# Patient Record
Sex: MALE | Race: WHITE | Employment: UNEMPLOYED | URBAN - METROPOLITAN AREA
[De-identification: names, ages, dates, MRNs, and addresses within clinical notes are randomized per-mention and may not be internally consistent; named-entity substitution may affect disease eponyms.]

---

## 2017-03-08 ENCOUNTER — OFFICE VISIT (OUTPATIENT)
Dept: PRIMARY CARE CLINIC | Age: 57
End: 2017-03-08

## 2017-03-08 VITALS — DIASTOLIC BLOOD PRESSURE: 80 MMHG | SYSTOLIC BLOOD PRESSURE: 140 MMHG | OXYGEN SATURATION: 100 % | HEART RATE: 82 BPM

## 2017-03-08 DIAGNOSIS — F17.200 SMOKER: ICD-10-CM

## 2017-03-08 DIAGNOSIS — G54.6 PHANTOM LIMB PAIN (HCC): ICD-10-CM

## 2017-03-08 DIAGNOSIS — E11.59 TYPE 2 DIABETES MELLITUS WITH OTHER CIRCULATORY COMPLICATION, UNSPECIFIED LONG TERM INSULIN USE STATUS: Primary | ICD-10-CM

## 2017-03-08 DIAGNOSIS — K21.9 GASTROESOPHAGEAL REFLUX DISEASE WITHOUT ESOPHAGITIS: ICD-10-CM

## 2017-03-08 DIAGNOSIS — I10 ESSENTIAL HYPERTENSION: ICD-10-CM

## 2017-03-08 LAB — HBA1C MFR BLD: 5.9 %

## 2017-03-08 PROCEDURE — 83036 HEMOGLOBIN GLYCOSYLATED A1C: CPT | Performed by: INTERNAL MEDICINE

## 2017-03-08 PROCEDURE — 99214 OFFICE O/P EST MOD 30 MIN: CPT | Performed by: INTERNAL MEDICINE

## 2017-03-08 RX ORDER — ATORVASTATIN CALCIUM 40 MG/1
40 TABLET, FILM COATED ORAL DAILY
Qty: 30 TABLET | Refills: 5 | Status: SHIPPED | OUTPATIENT
Start: 2017-03-08 | End: 2017-08-02 | Stop reason: SDUPTHER

## 2017-03-08 RX ORDER — LOSARTAN POTASSIUM 100 MG/1
100 TABLET ORAL DAILY
Qty: 30 TABLET | Refills: 6 | Status: SHIPPED | OUTPATIENT
Start: 2017-03-08 | End: 2017-08-02 | Stop reason: SDUPTHER

## 2017-03-08 RX ORDER — ASPIRIN 325 MG
325 TABLET ORAL DAILY
Qty: 30 TABLET | Refills: 5 | Status: SHIPPED | OUTPATIENT
Start: 2017-03-08 | End: 2017-05-24 | Stop reason: SDUPTHER

## 2017-03-08 RX ORDER — CLONIDINE HYDROCHLORIDE 0.3 MG/1
0.3 TABLET ORAL 3 TIMES DAILY
Qty: 30 TABLET | Refills: 6 | Status: SHIPPED | OUTPATIENT
Start: 2017-03-08 | End: 2017-03-08 | Stop reason: SDUPTHER

## 2017-03-08 RX ORDER — CLONIDINE HYDROCHLORIDE 0.3 MG/1
0.3 TABLET ORAL 3 TIMES DAILY
Qty: 90 TABLET | Refills: 6 | Status: SHIPPED | OUTPATIENT
Start: 2017-03-08 | End: 2017-08-02 | Stop reason: SDUPTHER

## 2017-03-08 RX ORDER — OMEPRAZOLE 20 MG/1
20 CAPSULE, DELAYED RELEASE ORAL DAILY
Qty: 30 CAPSULE | Refills: 5 | Status: SHIPPED | OUTPATIENT
Start: 2017-03-08 | End: 2017-08-02 | Stop reason: SDUPTHER

## 2017-03-08 RX ORDER — HYDROCHLOROTHIAZIDE 12.5 MG/1
12.5 CAPSULE, GELATIN COATED ORAL DAILY
Qty: 30 CAPSULE | Refills: 6 | Status: SHIPPED | OUTPATIENT
Start: 2017-03-08 | End: 2017-08-02

## 2017-03-08 RX ORDER — METOPROLOL TARTRATE 100 MG/1
TABLET ORAL
Qty: 60 TABLET | Refills: 6 | Status: SHIPPED | OUTPATIENT
Start: 2017-03-08 | End: 2017-08-02 | Stop reason: SDUPTHER

## 2017-03-08 RX ORDER — GABAPENTIN 800 MG/1
800 TABLET ORAL 3 TIMES DAILY
Qty: 90 TABLET | Refills: 5 | Status: SHIPPED | OUTPATIENT
Start: 2017-03-08 | End: 2017-08-02 | Stop reason: SDUPTHER

## 2017-03-08 ASSESSMENT — PATIENT HEALTH QUESTIONNAIRE - PHQ9
2. FEELING DOWN, DEPRESSED OR HOPELESS: 0
1. LITTLE INTEREST OR PLEASURE IN DOING THINGS: 0
SUM OF ALL RESPONSES TO PHQ9 QUESTIONS 1 & 2: 0
SUM OF ALL RESPONSES TO PHQ QUESTIONS 1-9: 0

## 2017-03-08 ASSESSMENT — ENCOUNTER SYMPTOMS
COUGH: 0
EYES NEGATIVE: 1
CHEST TIGHTNESS: 0
WHEEZING: 0
SHORTNESS OF BREATH: 0
GASTROINTESTINAL NEGATIVE: 1

## 2017-05-24 DIAGNOSIS — E11.59 TYPE 2 DIABETES MELLITUS WITH OTHER CIRCULATORY COMPLICATION, UNSPECIFIED LONG TERM INSULIN USE STATUS: ICD-10-CM

## 2017-05-24 RX ORDER — ASPIRIN 325 MG
325 TABLET ORAL DAILY
Qty: 30 TABLET | Refills: 5 | Status: SHIPPED | OUTPATIENT
Start: 2017-05-24 | End: 2017-08-02 | Stop reason: SDUPTHER

## 2017-06-21 ENCOUNTER — TELEPHONE (OUTPATIENT)
Dept: PRIMARY CARE CLINIC | Age: 57
End: 2017-06-21

## 2017-06-27 RX ORDER — ESCITALOPRAM OXALATE 10 MG/1
10 TABLET ORAL DAILY
Qty: 30 TABLET | Refills: 3 | Status: SHIPPED | OUTPATIENT
Start: 2017-06-27 | End: 2017-10-24 | Stop reason: SDUPTHER

## 2017-08-02 ENCOUNTER — OFFICE VISIT (OUTPATIENT)
Dept: PRIMARY CARE CLINIC | Age: 57
End: 2017-08-02

## 2017-08-02 VITALS
TEMPERATURE: 98.2 F | OXYGEN SATURATION: 97 % | RESPIRATION RATE: 16 BRPM | SYSTOLIC BLOOD PRESSURE: 150 MMHG | HEART RATE: 83 BPM | DIASTOLIC BLOOD PRESSURE: 80 MMHG

## 2017-08-02 DIAGNOSIS — Z12.11 SCREEN FOR COLON CANCER: ICD-10-CM

## 2017-08-02 DIAGNOSIS — E11.9 DIET-CONTROLLED DIABETES MELLITUS (HCC): ICD-10-CM

## 2017-08-02 DIAGNOSIS — G54.6 PHANTOM LIMB PAIN (HCC): ICD-10-CM

## 2017-08-02 DIAGNOSIS — Z11.4 ENCOUNTER FOR SCREENING FOR HIV: ICD-10-CM

## 2017-08-02 DIAGNOSIS — Z23 NEED FOR TDAP VACCINATION: ICD-10-CM

## 2017-08-02 DIAGNOSIS — K21.9 GASTROESOPHAGEAL REFLUX DISEASE WITHOUT ESOPHAGITIS: ICD-10-CM

## 2017-08-02 DIAGNOSIS — I10 ESSENTIAL HYPERTENSION: Primary | ICD-10-CM

## 2017-08-02 DIAGNOSIS — F17.200 SMOKER: ICD-10-CM

## 2017-08-02 LAB
A/G RATIO: 1.8 (ref 1.1–2.2)
ALBUMIN SERPL-MCNC: 4.5 G/DL (ref 3.4–5)
ALP BLD-CCNC: 87 U/L (ref 40–129)
ALT SERPL-CCNC: 23 U/L (ref 10–40)
ANION GAP SERPL CALCULATED.3IONS-SCNC: 17 MMOL/L (ref 3–16)
AST SERPL-CCNC: 21 U/L (ref 15–37)
BASOPHILS ABSOLUTE: 0 K/UL (ref 0–0.2)
BASOPHILS RELATIVE PERCENT: 0.6 %
BILIRUB SERPL-MCNC: 0.3 MG/DL (ref 0–1)
BILIRUBIN URINE: ABNORMAL
BLOOD, URINE: NEGATIVE
BUN BLDV-MCNC: 9 MG/DL (ref 7–20)
CALCIUM SERPL-MCNC: 9.1 MG/DL (ref 8.3–10.6)
CHLORIDE BLD-SCNC: 102 MMOL/L (ref 99–110)
CHOLESTEROL, TOTAL: 274 MG/DL (ref 0–199)
CLARITY: CLEAR
CO2: 22 MMOL/L (ref 21–32)
COLOR: ABNORMAL
CREAT SERPL-MCNC: 0.7 MG/DL (ref 0.9–1.3)
CREATININE URINE: 219 MG/DL (ref 39–259)
EOSINOPHILS ABSOLUTE: 0.1 K/UL (ref 0–0.6)
EOSINOPHILS RELATIVE PERCENT: 1.3 %
EPITHELIAL CELLS, UA: 3 /HPF (ref 0–5)
GFR AFRICAN AMERICAN: >60
GFR NON-AFRICAN AMERICAN: >60
GLOBULIN: 2.5 G/DL
GLUCOSE BLD-MCNC: 75 MG/DL (ref 70–99)
GLUCOSE URINE: NEGATIVE MG/DL
HBA1C MFR BLD: 6 %
HCT VFR BLD CALC: 49.5 % (ref 40.5–52.5)
HDLC SERPL-MCNC: 28 MG/DL (ref 40–60)
HEMOGLOBIN: 16.6 G/DL (ref 13.5–17.5)
HEPATITIS C ANTIBODY INTERPRETATION: NORMAL
HYALINE CASTS: 2 /LPF (ref 0–8)
KETONES, URINE: ABNORMAL MG/DL
LDL CHOLESTEROL CALCULATED: ABNORMAL MG/DL
LDL CHOLESTEROL DIRECT: 150 MG/DL
LEUKOCYTE ESTERASE, URINE: NEGATIVE
LYMPHOCYTES ABSOLUTE: 1.6 K/UL (ref 1–5.1)
LYMPHOCYTES RELATIVE PERCENT: 23.1 %
MCH RBC QN AUTO: 30.8 PG (ref 26–34)
MCHC RBC AUTO-ENTMCNC: 33.5 G/DL (ref 31–36)
MCV RBC AUTO: 92 FL (ref 80–100)
MICROALBUMIN UR-MCNC: 3.8 MG/DL
MICROALBUMIN/CREAT UR-RTO: 17.4 MG/G (ref 0–30)
MICROSCOPIC EXAMINATION: YES
MONOCYTES ABSOLUTE: 0.5 K/UL (ref 0–1.3)
MONOCYTES RELATIVE PERCENT: 6.9 %
NEUTROPHILS ABSOLUTE: 4.7 K/UL (ref 1.7–7.7)
NEUTROPHILS RELATIVE PERCENT: 68.1 %
NITRITE, URINE: NEGATIVE
PDW BLD-RTO: 14.1 % (ref 12.4–15.4)
PH UA: 6.5
PLATELET # BLD: 297 K/UL (ref 135–450)
PMV BLD AUTO: 8.8 FL (ref 5–10.5)
POTASSIUM SERPL-SCNC: 4.6 MMOL/L (ref 3.5–5.1)
PROTEIN UA: 30 MG/DL
RBC # BLD: 5.38 M/UL (ref 4.2–5.9)
RBC UA: 7 /HPF (ref 0–4)
SODIUM BLD-SCNC: 141 MMOL/L (ref 136–145)
SPECIFIC GRAVITY UA: 1.03
TOTAL PROTEIN: 7 G/DL (ref 6.4–8.2)
TRIGL SERPL-MCNC: 645 MG/DL (ref 0–150)
TSH SERPL DL<=0.05 MIU/L-ACNC: 2.43 UIU/ML (ref 0.27–4.2)
URINE TYPE: ABNORMAL
UROBILINOGEN, URINE: 1 E.U./DL
VLDLC SERPL CALC-MCNC: ABNORMAL MG/DL
WBC # BLD: 6.9 K/UL (ref 4–11)
WBC UA: 3 /HPF (ref 0–5)

## 2017-08-02 PROCEDURE — 90715 TDAP VACCINE 7 YRS/> IM: CPT | Performed by: INTERNAL MEDICINE

## 2017-08-02 PROCEDURE — 90471 IMMUNIZATION ADMIN: CPT | Performed by: INTERNAL MEDICINE

## 2017-08-02 PROCEDURE — 83036 HEMOGLOBIN GLYCOSYLATED A1C: CPT | Performed by: INTERNAL MEDICINE

## 2017-08-02 PROCEDURE — 99214 OFFICE O/P EST MOD 30 MIN: CPT | Performed by: INTERNAL MEDICINE

## 2017-08-02 RX ORDER — ATORVASTATIN CALCIUM 40 MG/1
40 TABLET, FILM COATED ORAL DAILY
Qty: 30 TABLET | Refills: 6 | Status: SHIPPED | OUTPATIENT
Start: 2017-08-02 | End: 2018-03-01 | Stop reason: SDUPTHER

## 2017-08-02 RX ORDER — METOPROLOL TARTRATE 100 MG/1
TABLET ORAL
Qty: 60 TABLET | Refills: 6 | Status: SHIPPED | OUTPATIENT
Start: 2017-08-02 | End: 2018-03-28

## 2017-08-02 RX ORDER — HYDROCHLOROTHIAZIDE 25 MG/1
25 TABLET ORAL DAILY
Qty: 30 TABLET | Refills: 3 | Status: SHIPPED | OUTPATIENT
Start: 2017-08-02 | End: 2017-11-28 | Stop reason: SDUPTHER

## 2017-08-02 RX ORDER — CLONIDINE HYDROCHLORIDE 0.3 MG/1
0.3 TABLET ORAL 3 TIMES DAILY
Qty: 90 TABLET | Refills: 6 | Status: SHIPPED | OUTPATIENT
Start: 2017-08-02 | End: 2017-12-21 | Stop reason: SDUPTHER

## 2017-08-02 RX ORDER — LOSARTAN POTASSIUM 100 MG/1
100 TABLET ORAL DAILY
Qty: 30 TABLET | Refills: 6 | Status: SHIPPED | OUTPATIENT
Start: 2017-08-02 | End: 2018-03-28 | Stop reason: SDUPTHER

## 2017-08-02 RX ORDER — OMEPRAZOLE 20 MG/1
20 CAPSULE, DELAYED RELEASE ORAL DAILY
Qty: 30 CAPSULE | Refills: 5 | Status: SHIPPED | OUTPATIENT
Start: 2017-08-02 | End: 2018-03-28

## 2017-08-02 RX ORDER — GABAPENTIN 800 MG/1
800 TABLET ORAL 3 TIMES DAILY
Qty: 90 TABLET | Refills: 6 | Status: SHIPPED | OUTPATIENT
Start: 2017-08-02 | End: 2018-03-28 | Stop reason: SDUPTHER

## 2017-08-02 RX ORDER — ASPIRIN 325 MG
325 TABLET ORAL DAILY
Qty: 90 TABLET | Refills: 5 | Status: SHIPPED | OUTPATIENT
Start: 2017-08-02 | End: 2018-08-25 | Stop reason: SDUPTHER

## 2017-08-02 ASSESSMENT — ENCOUNTER SYMPTOMS
EYES NEGATIVE: 1
COUGH: 0
BLOOD IN STOOL: 0
CHEST TIGHTNESS: 0
CONSTIPATION: 0
DIARRHEA: 0
GASTROINTESTINAL NEGATIVE: 1
WHEEZING: 0
SHORTNESS OF BREATH: 0

## 2017-08-03 LAB — HIV-1 AND HIV-2 ANTIBODIES: NORMAL

## 2017-09-14 ENCOUNTER — TELEPHONE (OUTPATIENT)
Dept: PRIMARY CARE CLINIC | Age: 57
End: 2017-09-14

## 2017-10-09 ENCOUNTER — TELEPHONE (OUTPATIENT)
Dept: OTHER | Facility: CLINIC | Age: 57
End: 2017-10-09

## 2017-10-24 RX ORDER — ESCITALOPRAM OXALATE 10 MG/1
TABLET ORAL
Qty: 30 TABLET | Refills: 2 | Status: SHIPPED | OUTPATIENT
Start: 2017-10-24 | End: 2018-01-18 | Stop reason: SDUPTHER

## 2017-11-28 DIAGNOSIS — I10 ESSENTIAL HYPERTENSION: ICD-10-CM

## 2017-12-06 RX ORDER — HYDROCHLOROTHIAZIDE 25 MG/1
TABLET ORAL
Qty: 30 TABLET | Refills: 2 | Status: SHIPPED | OUTPATIENT
Start: 2017-12-06 | End: 2018-03-28 | Stop reason: SDUPTHER

## 2017-12-21 DIAGNOSIS — I10 ESSENTIAL HYPERTENSION: ICD-10-CM

## 2017-12-21 RX ORDER — CLONIDINE HYDROCHLORIDE 0.3 MG/1
TABLET ORAL
Qty: 90 TABLET | Refills: 0 | Status: SHIPPED | OUTPATIENT
Start: 2017-12-21 | End: 2018-03-28

## 2018-01-18 RX ORDER — ESCITALOPRAM OXALATE 10 MG/1
TABLET ORAL
Qty: 30 TABLET | Refills: 1 | Status: SHIPPED | OUTPATIENT
Start: 2018-01-18 | End: 2018-03-17 | Stop reason: SDUPTHER

## 2018-03-01 DIAGNOSIS — K21.9 GASTROESOPHAGEAL REFLUX DISEASE WITHOUT ESOPHAGITIS: ICD-10-CM

## 2018-03-01 DIAGNOSIS — E11.9 DIET-CONTROLLED DIABETES MELLITUS (HCC): ICD-10-CM

## 2018-03-01 RX ORDER — OMEPRAZOLE 20 MG/1
CAPSULE, DELAYED RELEASE ORAL
Qty: 30 CAPSULE | Refills: 4 | Status: SHIPPED | OUTPATIENT
Start: 2018-03-01 | End: 2018-03-28 | Stop reason: SDUPTHER

## 2018-03-01 RX ORDER — ATORVASTATIN CALCIUM 40 MG/1
TABLET, FILM COATED ORAL
Qty: 30 TABLET | Refills: 5 | Status: SHIPPED | OUTPATIENT
Start: 2018-03-01 | End: 2018-03-28 | Stop reason: SDUPTHER

## 2018-03-14 ENCOUNTER — TELEPHONE (OUTPATIENT)
Dept: ADMINISTRATIVE | Age: 58
End: 2018-03-14

## 2018-03-20 RX ORDER — ESCITALOPRAM OXALATE 10 MG/1
TABLET ORAL
Qty: 30 TABLET | Refills: 0 | Status: SHIPPED | OUTPATIENT
Start: 2018-03-20 | End: 2018-03-28 | Stop reason: SDUPTHER

## 2018-03-28 ENCOUNTER — OFFICE VISIT (OUTPATIENT)
Dept: PRIMARY CARE CLINIC | Age: 58
End: 2018-03-28

## 2018-03-28 VITALS
SYSTOLIC BLOOD PRESSURE: 170 MMHG | DIASTOLIC BLOOD PRESSURE: 100 MMHG | HEART RATE: 82 BPM | TEMPERATURE: 97.5 F | OXYGEN SATURATION: 100 %

## 2018-03-28 DIAGNOSIS — K21.9 GASTROESOPHAGEAL REFLUX DISEASE WITHOUT ESOPHAGITIS: ICD-10-CM

## 2018-03-28 DIAGNOSIS — G54.6 PHANTOM LIMB PAIN (HCC): ICD-10-CM

## 2018-03-28 DIAGNOSIS — F32.A ANXIETY AND DEPRESSION: Primary | ICD-10-CM

## 2018-03-28 DIAGNOSIS — Z00.00 PHYSICAL EXAM: ICD-10-CM

## 2018-03-28 DIAGNOSIS — E11.9 DIET-CONTROLLED DIABETES MELLITUS (HCC): ICD-10-CM

## 2018-03-28 DIAGNOSIS — F41.9 ANXIETY AND DEPRESSION: Primary | ICD-10-CM

## 2018-03-28 DIAGNOSIS — I10 ESSENTIAL HYPERTENSION: ICD-10-CM

## 2018-03-28 LAB
A/G RATIO: 1.6 (ref 1.1–2.2)
ALBUMIN SERPL-MCNC: 4.1 G/DL (ref 3.4–5)
ALP BLD-CCNC: 95 U/L (ref 40–129)
ALT SERPL-CCNC: 13 U/L (ref 10–40)
ANION GAP SERPL CALCULATED.3IONS-SCNC: 15 MMOL/L (ref 3–16)
AST SERPL-CCNC: 15 U/L (ref 15–37)
BASOPHILS ABSOLUTE: 0.1 K/UL (ref 0–0.2)
BASOPHILS RELATIVE PERCENT: 0.7 %
BILIRUB SERPL-MCNC: 0.3 MG/DL (ref 0–1)
BUN BLDV-MCNC: 6 MG/DL (ref 7–20)
CALCIUM SERPL-MCNC: 9.1 MG/DL (ref 8.3–10.6)
CHLORIDE BLD-SCNC: 98 MMOL/L (ref 99–110)
CHOLESTEROL, TOTAL: 248 MG/DL (ref 0–199)
CO2: 26 MMOL/L (ref 21–32)
CREAT SERPL-MCNC: 0.6 MG/DL (ref 0.9–1.3)
EOSINOPHILS ABSOLUTE: 0.1 K/UL (ref 0–0.6)
EOSINOPHILS RELATIVE PERCENT: 1 %
GFR AFRICAN AMERICAN: >60
GFR NON-AFRICAN AMERICAN: >60
GLOBULIN: 2.6 G/DL
GLUCOSE BLD-MCNC: 86 MG/DL (ref 70–99)
HBA1C MFR BLD: 5.8 %
HCT VFR BLD CALC: 45.2 % (ref 40.5–52.5)
HDLC SERPL-MCNC: 25 MG/DL (ref 40–60)
HEMOGLOBIN: 15.7 G/DL (ref 13.5–17.5)
LDL CHOLESTEROL CALCULATED: ABNORMAL MG/DL
LDL CHOLESTEROL DIRECT: 170 MG/DL
LYMPHOCYTES ABSOLUTE: 1.8 K/UL (ref 1–5.1)
LYMPHOCYTES RELATIVE PERCENT: 19.5 %
MCH RBC QN AUTO: 30.9 PG (ref 26–34)
MCHC RBC AUTO-ENTMCNC: 34.8 G/DL (ref 31–36)
MCV RBC AUTO: 89 FL (ref 80–100)
MONOCYTES ABSOLUTE: 0.5 K/UL (ref 0–1.3)
MONOCYTES RELATIVE PERCENT: 5.7 %
NEUTROPHILS ABSOLUTE: 6.7 K/UL (ref 1.7–7.7)
NEUTROPHILS RELATIVE PERCENT: 73.1 %
PDW BLD-RTO: 13.7 % (ref 12.4–15.4)
PLATELET # BLD: 413 K/UL (ref 135–450)
PMV BLD AUTO: 7.5 FL (ref 5–10.5)
POTASSIUM SERPL-SCNC: 4.7 MMOL/L (ref 3.5–5.1)
RBC # BLD: 5.08 M/UL (ref 4.2–5.9)
SODIUM BLD-SCNC: 139 MMOL/L (ref 136–145)
TOTAL PROTEIN: 6.7 G/DL (ref 6.4–8.2)
TRIGL SERPL-MCNC: 334 MG/DL (ref 0–150)
TSH SERPL DL<=0.05 MIU/L-ACNC: 2.17 UIU/ML (ref 0.27–4.2)
VLDLC SERPL CALC-MCNC: ABNORMAL MG/DL
WBC # BLD: 9.1 K/UL (ref 4–11)

## 2018-03-28 PROCEDURE — 3044F HG A1C LEVEL LT 7.0%: CPT | Performed by: INTERNAL MEDICINE

## 2018-03-28 PROCEDURE — 99214 OFFICE O/P EST MOD 30 MIN: CPT | Performed by: INTERNAL MEDICINE

## 2018-03-28 PROCEDURE — G8421 BMI NOT CALCULATED: HCPCS | Performed by: INTERNAL MEDICINE

## 2018-03-28 PROCEDURE — 83036 HEMOGLOBIN GLYCOSYLATED A1C: CPT | Performed by: INTERNAL MEDICINE

## 2018-03-28 PROCEDURE — 3017F COLORECTAL CA SCREEN DOC REV: CPT | Performed by: INTERNAL MEDICINE

## 2018-03-28 PROCEDURE — G8484 FLU IMMUNIZE NO ADMIN: HCPCS | Performed by: INTERNAL MEDICINE

## 2018-03-28 PROCEDURE — G8427 DOCREV CUR MEDS BY ELIG CLIN: HCPCS | Performed by: INTERNAL MEDICINE

## 2018-03-28 PROCEDURE — 4004F PT TOBACCO SCREEN RCVD TLK: CPT | Performed by: INTERNAL MEDICINE

## 2018-03-28 RX ORDER — HYDROCHLOROTHIAZIDE 25 MG/1
25 TABLET ORAL DAILY
Qty: 30 TABLET | Refills: 5 | Status: SHIPPED | OUTPATIENT
Start: 2018-03-28 | End: 2018-04-24 | Stop reason: SDUPTHER

## 2018-03-28 RX ORDER — NEBIVOLOL 10 MG/1
10 TABLET ORAL DAILY
Qty: 30 TABLET | Refills: 5 | Status: SHIPPED | OUTPATIENT
Start: 2018-03-28 | End: 2018-04-24 | Stop reason: SDUPTHER

## 2018-03-28 RX ORDER — LOSARTAN POTASSIUM 100 MG/1
100 TABLET ORAL DAILY
Qty: 30 TABLET | Refills: 6 | Status: SHIPPED | OUTPATIENT
Start: 2018-03-28 | End: 2018-04-24 | Stop reason: SDUPTHER

## 2018-03-28 RX ORDER — HYDROCODONE BITARTRATE AND ACETAMINOPHEN 5; 325 MG/1; MG/1
1 TABLET ORAL EVERY 8 HOURS PRN
Qty: 30 TABLET | Refills: 0 | Status: SHIPPED | OUTPATIENT
Start: 2018-03-28 | End: 2018-04-24 | Stop reason: SDUPTHER

## 2018-03-28 RX ORDER — GABAPENTIN 800 MG/1
800 TABLET ORAL 3 TIMES DAILY
Qty: 90 TABLET | Refills: 6 | Status: SHIPPED | OUTPATIENT
Start: 2018-03-28 | End: 2019-02-25

## 2018-03-28 RX ORDER — ESCITALOPRAM OXALATE 10 MG/1
10 TABLET ORAL DAILY
Qty: 30 TABLET | Refills: 6 | Status: SHIPPED | OUTPATIENT
Start: 2018-03-28 | End: 2018-11-29 | Stop reason: SDUPTHER

## 2018-03-28 RX ORDER — OMEPRAZOLE 20 MG/1
20 CAPSULE, DELAYED RELEASE ORAL DAILY
Qty: 30 CAPSULE | Refills: 5 | Status: SHIPPED | OUTPATIENT
Start: 2018-03-28 | End: 2019-01-27 | Stop reason: SDUPTHER

## 2018-03-28 RX ORDER — ATORVASTATIN CALCIUM 40 MG/1
40 TABLET, FILM COATED ORAL DAILY
Qty: 30 TABLET | Refills: 6 | Status: SHIPPED | OUTPATIENT
Start: 2018-03-28 | End: 2018-03-29 | Stop reason: SDUPTHER

## 2018-03-28 RX ORDER — CLONIDINE HYDROCHLORIDE 0.3 MG/1
0.3 TABLET ORAL 3 TIMES DAILY PRN
Qty: 90 TABLET | Refills: 3 | Status: SHIPPED | OUTPATIENT
Start: 2018-03-28 | End: 2018-04-24 | Stop reason: SDUPTHER

## 2018-03-28 ASSESSMENT — ENCOUNTER SYMPTOMS
DIARRHEA: 0
CONSTIPATION: 0
WHEEZING: 0
GASTROINTESTINAL NEGATIVE: 1
BLOOD IN STOOL: 0
EYES NEGATIVE: 1
COUGH: 0
CHEST TIGHTNESS: 0
SHORTNESS OF BREATH: 0

## 2018-03-28 ASSESSMENT — PATIENT HEALTH QUESTIONNAIRE - PHQ9
1. LITTLE INTEREST OR PLEASURE IN DOING THINGS: 0
SUM OF ALL RESPONSES TO PHQ QUESTIONS 1-9: 0
2. FEELING DOWN, DEPRESSED OR HOPELESS: 0
SUM OF ALL RESPONSES TO PHQ9 QUESTIONS 1 & 2: 0

## 2018-03-29 DIAGNOSIS — E11.9 DIET-CONTROLLED DIABETES MELLITUS (HCC): ICD-10-CM

## 2018-03-29 LAB — VITAMIN D 25-HYDROXY: 10.7 NG/ML

## 2018-03-29 RX ORDER — ERGOCALCIFEROL (VITAMIN D2) 1250 MCG
50000 CAPSULE ORAL WEEKLY
Qty: 4 CAPSULE | Refills: 5 | Status: SHIPPED | OUTPATIENT
Start: 2018-03-29 | End: 2019-08-09

## 2018-03-29 RX ORDER — ATORVASTATIN CALCIUM 40 MG/1
40 TABLET, FILM COATED ORAL DAILY
Qty: 30 TABLET | Refills: 6 | Status: SHIPPED | OUTPATIENT
Start: 2018-03-29 | End: 2018-06-05

## 2018-04-20 ENCOUNTER — TELEPHONE (OUTPATIENT)
Dept: PRIMARY CARE CLINIC | Age: 58
End: 2018-04-20

## 2018-04-24 ENCOUNTER — OFFICE VISIT (OUTPATIENT)
Dept: PRIMARY CARE CLINIC | Age: 58
End: 2018-04-24

## 2018-04-24 VITALS — HEART RATE: 87 BPM | TEMPERATURE: 98.2 F | DIASTOLIC BLOOD PRESSURE: 90 MMHG | SYSTOLIC BLOOD PRESSURE: 160 MMHG

## 2018-04-24 DIAGNOSIS — E78.2 MIXED HYPERLIPIDEMIA: ICD-10-CM

## 2018-04-24 DIAGNOSIS — I10 ESSENTIAL HYPERTENSION: ICD-10-CM

## 2018-04-24 DIAGNOSIS — G54.6 PHANTOM LIMB PAIN (HCC): ICD-10-CM

## 2018-04-24 DIAGNOSIS — F17.200 SMOKER: ICD-10-CM

## 2018-04-24 DIAGNOSIS — E55.9 VITAMIN D DEFICIENCY: ICD-10-CM

## 2018-04-24 PROCEDURE — G8427 DOCREV CUR MEDS BY ELIG CLIN: HCPCS | Performed by: INTERNAL MEDICINE

## 2018-04-24 PROCEDURE — 99214 OFFICE O/P EST MOD 30 MIN: CPT | Performed by: INTERNAL MEDICINE

## 2018-04-24 PROCEDURE — 3017F COLORECTAL CA SCREEN DOC REV: CPT | Performed by: INTERNAL MEDICINE

## 2018-04-24 PROCEDURE — 4004F PT TOBACCO SCREEN RCVD TLK: CPT | Performed by: INTERNAL MEDICINE

## 2018-04-24 PROCEDURE — G8421 BMI NOT CALCULATED: HCPCS | Performed by: INTERNAL MEDICINE

## 2018-04-24 RX ORDER — LOSARTAN POTASSIUM 100 MG/1
100 TABLET ORAL DAILY
Qty: 30 TABLET | Refills: 6 | Status: SHIPPED | OUTPATIENT
Start: 2018-04-24 | End: 2018-06-05 | Stop reason: SDUPTHER

## 2018-04-24 RX ORDER — NEBIVOLOL 10 MG/1
10 TABLET ORAL DAILY
Qty: 30 TABLET | Refills: 5 | Status: SHIPPED | OUTPATIENT
Start: 2018-04-24 | End: 2018-06-05 | Stop reason: SDUPTHER

## 2018-04-24 RX ORDER — CLONIDINE HYDROCHLORIDE 0.3 MG/1
0.3 TABLET ORAL 3 TIMES DAILY PRN
Qty: 90 TABLET | Refills: 3 | Status: SHIPPED | OUTPATIENT
Start: 2018-04-24 | End: 2018-06-05 | Stop reason: SDUPTHER

## 2018-04-24 RX ORDER — HYDROCHLOROTHIAZIDE 25 MG/1
25 TABLET ORAL DAILY
Qty: 30 TABLET | Refills: 5 | Status: SHIPPED | OUTPATIENT
Start: 2018-04-24 | End: 2018-06-05 | Stop reason: SDUPTHER

## 2018-04-24 RX ORDER — ROSUVASTATIN CALCIUM 5 MG/1
5 TABLET, COATED ORAL NIGHTLY
Qty: 30 TABLET | Refills: 5 | Status: SHIPPED | OUTPATIENT
Start: 2018-04-24 | End: 2018-09-06 | Stop reason: SDUPTHER

## 2018-04-24 RX ORDER — HYDROCODONE BITARTRATE AND ACETAMINOPHEN 5; 325 MG/1; MG/1
1 TABLET ORAL EVERY 8 HOURS PRN
Qty: 30 TABLET | Refills: 0 | Status: SHIPPED | OUTPATIENT
Start: 2018-04-24 | End: 2018-05-08 | Stop reason: ALTCHOICE

## 2018-04-24 ASSESSMENT — ENCOUNTER SYMPTOMS
GASTROINTESTINAL NEGATIVE: 1
BLOOD IN STOOL: 0
EYES NEGATIVE: 1
SHORTNESS OF BREATH: 0
CONSTIPATION: 0
COUGH: 0
CHEST TIGHTNESS: 0
DIARRHEA: 0
WHEEZING: 0

## 2018-04-25 ENCOUNTER — TELEPHONE (OUTPATIENT)
Dept: PAIN MANAGEMENT | Age: 58
End: 2018-04-25

## 2018-05-08 ENCOUNTER — OFFICE VISIT (OUTPATIENT)
Dept: PAIN MANAGEMENT | Age: 58
End: 2018-05-08

## 2018-05-08 VITALS — SYSTOLIC BLOOD PRESSURE: 163 MMHG | DIASTOLIC BLOOD PRESSURE: 90 MMHG | HEART RATE: 89 BPM

## 2018-05-08 DIAGNOSIS — Z51.81 ENCOUNTER FOR MONITORING OPIOID MAINTENANCE THERAPY: ICD-10-CM

## 2018-05-08 DIAGNOSIS — Z79.891 ENCOUNTER FOR MONITORING OPIOID MAINTENANCE THERAPY: ICD-10-CM

## 2018-05-08 DIAGNOSIS — F19.11 HISTORY OF SUBSTANCE ABUSE (HCC): ICD-10-CM

## 2018-05-08 DIAGNOSIS — G54.6 PHANTOM LIMB SYNDROME WITH PAIN (HCC): Primary | ICD-10-CM

## 2018-05-08 DIAGNOSIS — G89.4 CHRONIC PAIN SYNDROME: ICD-10-CM

## 2018-05-08 PROCEDURE — 4004F PT TOBACCO SCREEN RCVD TLK: CPT | Performed by: ANESTHESIOLOGY

## 2018-05-08 PROCEDURE — 96160 PT-FOCUSED HLTH RISK ASSMT: CPT | Performed by: ANESTHESIOLOGY

## 2018-05-08 PROCEDURE — G8421 BMI NOT CALCULATED: HCPCS | Performed by: ANESTHESIOLOGY

## 2018-05-08 PROCEDURE — G8427 DOCREV CUR MEDS BY ELIG CLIN: HCPCS | Performed by: ANESTHESIOLOGY

## 2018-05-08 PROCEDURE — 99204 OFFICE O/P NEW MOD 45 MIN: CPT | Performed by: ANESTHESIOLOGY

## 2018-05-08 PROCEDURE — 3017F COLORECTAL CA SCREEN DOC REV: CPT | Performed by: ANESTHESIOLOGY

## 2018-05-08 RX ORDER — TRAZODONE HYDROCHLORIDE 50 MG/1
50 TABLET ORAL NIGHTLY
Qty: 60 TABLET | Refills: 0 | Status: SHIPPED | OUTPATIENT
Start: 2018-05-08 | End: 2019-02-25

## 2018-05-08 RX ORDER — BUPRENORPHINE 5 UG/H
1 PATCH TRANSDERMAL WEEKLY
Qty: 4 PATCH | Refills: 0 | Status: SHIPPED | OUTPATIENT
Start: 2018-05-08 | End: 2018-07-09 | Stop reason: SDUPTHER

## 2018-05-08 RX ORDER — BACLOFEN 10 MG/1
10 TABLET ORAL 2 TIMES DAILY
Qty: 60 TABLET | Refills: 0 | Status: SHIPPED | OUTPATIENT
Start: 2018-05-08 | End: 2018-07-09 | Stop reason: SDUPTHER

## 2018-05-08 ASSESSMENT — PATIENT HEALTH QUESTIONNAIRE - PHQ9
3. TROUBLE FALLING OR STAYING ASLEEP: 3
6. FEELING BAD ABOUT YOURSELF - OR THAT YOU ARE A FAILURE OR HAVE LET YOURSELF OR YOUR FAMILY DOWN: 3
SUM OF ALL RESPONSES TO PHQ QUESTIONS 1-9: 21
7. TROUBLE CONCENTRATING ON THINGS, SUCH AS READING THE NEWSPAPER OR WATCHING TELEVISION: 3
2. FEELING DOWN, DEPRESSED OR HOPELESS: 3
5. POOR APPETITE OR OVEREATING: 3
1. LITTLE INTEREST OR PLEASURE IN DOING THINGS: 3
8. MOVING OR SPEAKING SO SLOWLY THAT OTHER PEOPLE COULD HAVE NOTICED. OR THE OPPOSITE, BEING SO FIGETY OR RESTLESS THAT YOU HAVE BEEN MOVING AROUND A LOT MORE THAN USUAL: 0
10. IF YOU CHECKED OFF ANY PROBLEMS, HOW DIFFICULT HAVE THESE PROBLEMS MADE IT FOR YOU TO DO YOUR WORK, TAKE CARE OF THINGS AT HOME, OR GET ALONG WITH OTHER PEOPLE: 3
4. FEELING TIRED OR HAVING LITTLE ENERGY: 3
SUM OF ALL RESPONSES TO PHQ9 QUESTIONS 1 & 2: 6
9. THOUGHTS THAT YOU WOULD BE BETTER OFF DEAD, OR OF HURTING YOURSELF: 0

## 2018-05-11 LAB
6-ACETYLMORPHINE: NOT DETECTED
7-AMINOCLONAZEPAM: NOT DETECTED
ALPHA-OH-ALPRAZOLAM: NOT DETECTED
ALPRAZOLAM: NOT DETECTED
AMPHETAMINE: NOT DETECTED
BARBITURATES: NOT DETECTED
BENZOYLECGONINE: NOT DETECTED
BUPRENORPHINE: NOT DETECTED
CARISOPRODOL: NOT DETECTED
CLONAZEPAM: NOT DETECTED
CODEINE: NOT DETECTED
CREATININE URINE: 98.1 MG/DL (ref 20–400)
DIAZEPAM: NOT DETECTED
DRUGS EXPECTED: NORMAL
EER PAIN MGT DRUG PANEL, HIGH RES/EMIT U: NORMAL
ETHYL GLUCURONIDE: NOT DETECTED
FENTANYL: NOT DETECTED
HYDROCODONE: PRESENT
HYDROMORPHONE: NOT DETECTED
LORAZEPAM: NOT DETECTED
MARIJUANA METABOLITE: PRESENT
MDA: NOT DETECTED
MDEA: NOT DETECTED
MDMA URINE: NOT DETECTED
MEPERIDINE: NOT DETECTED
METHADONE: NOT DETECTED
METHAMPHETAMINE: NOT DETECTED
METHYLPHENIDATE: NOT DETECTED
MIDAZOLAM: NOT DETECTED
MORPHINE: NOT DETECTED
NORBUPRENORPHINE, FREE: NOT DETECTED
NORDIAZEPAM: NOT DETECTED
NORFENTANYL: NOT DETECTED
NORHYDROCODONE, URINE: PRESENT
NOROXYCODONE: NOT DETECTED
NOROXYMORPHONE, URINE: NOT DETECTED
OXAZEPAM: NOT DETECTED
OXYCODONE: NOT DETECTED
OXYMORPHONE: NOT DETECTED
PAIN MANAGEMENT DRUG PANEL: NORMAL
PAIN MANAGEMENT DRUG PANEL: NORMAL
PCP: NOT DETECTED
PHENTERMINE: NOT DETECTED
PROPOXYPHENE: NOT DETECTED
TAPENTADOL, URINE: NOT DETECTED
TAPENTADOL-O-SULFATE, URINE: NOT DETECTED
TEMAZEPAM: NOT DETECTED
TRAMADOL: NOT DETECTED
ZOLPIDEM: NOT DETECTED

## 2018-06-05 ENCOUNTER — OFFICE VISIT (OUTPATIENT)
Dept: PRIMARY CARE CLINIC | Age: 58
End: 2018-06-05

## 2018-06-05 VITALS
DIASTOLIC BLOOD PRESSURE: 70 MMHG | TEMPERATURE: 97.8 F | SYSTOLIC BLOOD PRESSURE: 130 MMHG | HEART RATE: 79 BPM | OXYGEN SATURATION: 98 %

## 2018-06-05 DIAGNOSIS — I10 ESSENTIAL HYPERTENSION: ICD-10-CM

## 2018-06-05 DIAGNOSIS — F17.200 SMOKER: ICD-10-CM

## 2018-06-05 PROCEDURE — 4004F PT TOBACCO SCREEN RCVD TLK: CPT | Performed by: INTERNAL MEDICINE

## 2018-06-05 PROCEDURE — G8427 DOCREV CUR MEDS BY ELIG CLIN: HCPCS | Performed by: INTERNAL MEDICINE

## 2018-06-05 PROCEDURE — 3017F COLORECTAL CA SCREEN DOC REV: CPT | Performed by: INTERNAL MEDICINE

## 2018-06-05 PROCEDURE — G8421 BMI NOT CALCULATED: HCPCS | Performed by: INTERNAL MEDICINE

## 2018-06-05 PROCEDURE — 99213 OFFICE O/P EST LOW 20 MIN: CPT | Performed by: INTERNAL MEDICINE

## 2018-06-05 RX ORDER — CLONIDINE HYDROCHLORIDE 0.3 MG/1
0.3 TABLET ORAL 3 TIMES DAILY PRN
Qty: 90 TABLET | Refills: 3 | Status: SHIPPED | OUTPATIENT
Start: 2018-06-05 | End: 2018-09-06 | Stop reason: SDUPTHER

## 2018-06-05 RX ORDER — NEBIVOLOL 10 MG/1
10 TABLET ORAL DAILY
Qty: 30 TABLET | Refills: 5 | Status: SHIPPED | OUTPATIENT
Start: 2018-06-05 | End: 2018-09-06 | Stop reason: SDUPTHER

## 2018-06-05 RX ORDER — HYDROCHLOROTHIAZIDE 25 MG/1
25 TABLET ORAL DAILY
Qty: 30 TABLET | Refills: 5 | Status: SHIPPED | OUTPATIENT
Start: 2018-06-05 | End: 2018-09-06

## 2018-06-05 RX ORDER — LOSARTAN POTASSIUM 100 MG/1
100 TABLET ORAL DAILY
Qty: 30 TABLET | Refills: 6 | Status: SHIPPED | OUTPATIENT
Start: 2018-06-05 | End: 2018-09-06

## 2018-06-05 ASSESSMENT — ENCOUNTER SYMPTOMS
CHEST TIGHTNESS: 0
EYES NEGATIVE: 1
COUGH: 0
CONSTIPATION: 0
GASTROINTESTINAL NEGATIVE: 1
WHEEZING: 0
SHORTNESS OF BREATH: 0
DIARRHEA: 0
BLOOD IN STOOL: 0

## 2018-06-14 RX ORDER — ALBUTEROL SULFATE 90 UG/1
2 AEROSOL, METERED RESPIRATORY (INHALATION) EVERY 4 HOURS PRN
Qty: 1 INHALER | Refills: 3 | Status: SHIPPED | OUTPATIENT
Start: 2018-06-14 | End: 2018-06-21 | Stop reason: SDUPTHER

## 2018-06-20 ENCOUNTER — TELEPHONE (OUTPATIENT)
Dept: PRIMARY CARE CLINIC | Age: 58
End: 2018-06-20

## 2018-06-21 ENCOUNTER — HOSPITAL ENCOUNTER (OUTPATIENT)
Dept: OTHER | Age: 58
Discharge: HOME | End: 2018-06-21
Attending: INTERNAL MEDICINE | Admitting: INTERNAL MEDICINE

## 2018-06-21 ENCOUNTER — OFFICE VISIT (OUTPATIENT)
Dept: PRIMARY CARE CLINIC | Age: 58
End: 2018-06-21

## 2018-06-21 VITALS
SYSTOLIC BLOOD PRESSURE: 130 MMHG | DIASTOLIC BLOOD PRESSURE: 80 MMHG | HEART RATE: 96 BPM | OXYGEN SATURATION: 96 % | TEMPERATURE: 98.8 F

## 2018-06-21 DIAGNOSIS — J44.1 COPD WITH ACUTE EXACERBATION (HCC): ICD-10-CM

## 2018-06-21 DIAGNOSIS — Z09 HOSPITAL DISCHARGE FOLLOW-UP: ICD-10-CM

## 2018-06-21 DIAGNOSIS — I10 ESSENTIAL HYPERTENSION: ICD-10-CM

## 2018-06-21 DIAGNOSIS — J18.9 PNEUMONIA DUE TO INFECTIOUS ORGANISM, UNSPECIFIED LATERALITY, UNSPECIFIED PART OF LUNG: ICD-10-CM

## 2018-06-21 PROCEDURE — 3017F COLORECTAL CA SCREEN DOC REV: CPT | Performed by: INTERNAL MEDICINE

## 2018-06-21 PROCEDURE — 3023F SPIROM DOC REV: CPT | Performed by: INTERNAL MEDICINE

## 2018-06-21 PROCEDURE — G8427 DOCREV CUR MEDS BY ELIG CLIN: HCPCS | Performed by: INTERNAL MEDICINE

## 2018-06-21 PROCEDURE — 4004F PT TOBACCO SCREEN RCVD TLK: CPT | Performed by: INTERNAL MEDICINE

## 2018-06-21 PROCEDURE — G8420 CALC BMI NORM PARAMETERS: HCPCS | Performed by: INTERNAL MEDICINE

## 2018-06-21 PROCEDURE — 99214 OFFICE O/P EST MOD 30 MIN: CPT | Performed by: INTERNAL MEDICINE

## 2018-06-21 PROCEDURE — G8926 SPIRO NO PERF OR DOC: HCPCS | Performed by: INTERNAL MEDICINE

## 2018-06-21 RX ORDER — DOXYCYCLINE HYCLATE 100 MG
100 TABLET ORAL 2 TIMES DAILY
Qty: 20 TABLET | Refills: 0 | Status: SHIPPED | OUTPATIENT
Start: 2018-06-21 | End: 2018-07-01

## 2018-06-21 RX ORDER — PREDNISONE 20 MG/1
40 TABLET ORAL DAILY
Qty: 10 TABLET | Refills: 0 | Status: SHIPPED | OUTPATIENT
Start: 2018-06-21 | End: 2018-06-26

## 2018-06-21 RX ORDER — ALBUTEROL SULFATE 90 UG/1
2 AEROSOL, METERED RESPIRATORY (INHALATION) EVERY 4 HOURS PRN
Qty: 1 INHALER | Refills: 3 | Status: SHIPPED | OUTPATIENT
Start: 2018-06-21 | End: 2018-09-06 | Stop reason: SDUPTHER

## 2018-06-21 ASSESSMENT — ENCOUNTER SYMPTOMS
CHEST TIGHTNESS: 1
BLOOD IN STOOL: 0
COUGH: 1
GASTROINTESTINAL NEGATIVE: 1
DIARRHEA: 0
SHORTNESS OF BREATH: 1
CONSTIPATION: 0
BACK PAIN: 1
EYES NEGATIVE: 1
WHEEZING: 0

## 2018-07-09 ENCOUNTER — OFFICE VISIT (OUTPATIENT)
Dept: PAIN MANAGEMENT | Age: 58
End: 2018-07-09

## 2018-07-09 VITALS
DIASTOLIC BLOOD PRESSURE: 87 MMHG | HEART RATE: 87 BPM | WEIGHT: 167 LBS | SYSTOLIC BLOOD PRESSURE: 147 MMHG | BODY MASS INDEX: 21.44 KG/M2

## 2018-07-09 DIAGNOSIS — G54.6 PHANTOM LIMB SYNDROME WITH PAIN (HCC): Primary | ICD-10-CM

## 2018-07-09 DIAGNOSIS — M47.896 OTHER SPONDYLOSIS, LUMBAR REGION: ICD-10-CM

## 2018-07-09 DIAGNOSIS — Z79.891 ENCOUNTER FOR MONITORING OPIOID MAINTENANCE THERAPY: ICD-10-CM

## 2018-07-09 DIAGNOSIS — Z51.81 ENCOUNTER FOR MONITORING OPIOID MAINTENANCE THERAPY: ICD-10-CM

## 2018-07-09 PROBLEM — G89.4 CHRONIC PAIN SYNDROME: Status: ACTIVE | Noted: 2018-07-09

## 2018-07-09 PROCEDURE — 3017F COLORECTAL CA SCREEN DOC REV: CPT | Performed by: ANESTHESIOLOGY

## 2018-07-09 PROCEDURE — 99213 OFFICE O/P EST LOW 20 MIN: CPT | Performed by: ANESTHESIOLOGY

## 2018-07-09 PROCEDURE — G8427 DOCREV CUR MEDS BY ELIG CLIN: HCPCS | Performed by: ANESTHESIOLOGY

## 2018-07-09 PROCEDURE — 4004F PT TOBACCO SCREEN RCVD TLK: CPT | Performed by: ANESTHESIOLOGY

## 2018-07-09 PROCEDURE — G8420 CALC BMI NORM PARAMETERS: HCPCS | Performed by: ANESTHESIOLOGY

## 2018-07-09 RX ORDER — BACLOFEN 10 MG/1
10 TABLET ORAL 2 TIMES DAILY
Qty: 60 TABLET | Refills: 0 | Status: SHIPPED | OUTPATIENT
Start: 2018-07-09 | End: 2019-02-25

## 2018-07-09 RX ORDER — BUPRENORPHINE 5 UG/H
1 PATCH TRANSDERMAL WEEKLY
Qty: 4 PATCH | Refills: 0 | Status: SHIPPED | OUTPATIENT
Start: 2018-07-09 | End: 2018-07-09 | Stop reason: SDUPTHER

## 2018-07-09 RX ORDER — BUPRENORPHINE 5 UG/H
1 PATCH TRANSDERMAL WEEKLY
Qty: 4 PATCH | Refills: 0 | Status: SHIPPED | OUTPATIENT
Start: 2018-07-09 | End: 2019-02-25

## 2018-07-09 ASSESSMENT — ENCOUNTER SYMPTOMS
EYE REDNESS: 0
CONSTIPATION: 0
BACK PAIN: 1
EYE DISCHARGE: 0
VOMITING: 0
SHORTNESS OF BREATH: 0
HEARTBURN: 0
EYE PAIN: 0
ABDOMINAL PAIN: 0
NAUSEA: 0
WHEEZING: 0
ORTHOPNEA: 0
COUGH: 0
HEMOPTYSIS: 0

## 2018-07-09 NOTE — PROGRESS NOTES
Father         lung    Diabetes Father     High Cholesterol Father     High Blood Pressure Father     Cancer Sister     High Blood Pressure Sister     Diabetes Sister     High Blood Pressure Brother        Social History     Social History    Marital status: Single     Spouse name: N/A    Number of children: N/A    Years of education: N/A     Occupational History    Disabled      2007     Social History Main Topics    Smoking status: Current Every Day Smoker     Packs/day: 1.00     Years: 40.00     Types: Cigarettes    Smokeless tobacco: Never Used    Alcohol use No      Comment: occasionally light    Drug use: Yes     Frequency: 1.0 time per week     Types: Marijuana      Comment: weed from time to time    Sexual activity: No     Other Topics Concern    Not on file     Social History Narrative    No narrative on file     h/o incarceration for drug abuse (cocaine) and rehab 20 years ago; recreational use of THC    Imaging Studies:   None    Results of the above studies were personally reviewed by me with the patient in detail along with the images, when available. The patient was instructed to contact the primary care provider regarding other unrelated findings not addressed during today's visit. Review of Systems:   Review of Systems   Constitutional: Negative for chills, fever, malaise/fatigue and weight loss. HENT: Negative for hearing loss and tinnitus. Eyes: Negative for pain, discharge and redness. Respiratory: Negative for cough, hemoptysis, shortness of breath and wheezing. Cardiovascular: Negative for chest pain, palpitations and orthopnea. Gastrointestinal: Negative for abdominal pain, constipation, heartburn, nausea and vomiting. Genitourinary: Negative for frequency, hematuria and urgency. Musculoskeletal: Positive for back pain and joint pain. Negative for falls, myalgias and neck pain. Skin: Negative for itching and rash.    Neurological: Negative for dizziness, tingling, sensory change, focal weakness, seizures, weakness and headaches. Endo/Heme/Allergies: Does not bruise/bleed easily. Psychiatric/Behavioral: Negative for depression, substance abuse and suicidal ideas. The patient is not nervous/anxious and does not have insomnia. The patient was instructed to contact primary care physician regarding ROS positives not being addressed during today's visit. Physical Exam:   Physical Exam   Constitutional: He is oriented to person, place, and time. No distress. Mild distress, ambulates in wheel chair. HENT:   Head: Normocephalic. Eyes: EOM are normal. Pupils are equal, round, and reactive to light. Neck: Normal range of motion. Neck supple. No thyromegaly present. Cardiovascular: Normal rate, regular rhythm, normal heart sounds and intact distal pulses. Pulmonary/Chest: Effort normal and breath sounds normal. No respiratory distress. He exhibits no tenderness. Abdominal: Soft. Bowel sounds are normal. He exhibits no mass. There is no tenderness. There is no guarding. Musculoskeletal: Normal range of motion. He exhibits no tenderness or deformity. Exam of back showed moderate left sided lumbar paraspinal tenderness; worse on extension. Bilateral AKA present   Lymphadenopathy:     He has no cervical adenopathy. Neurological: He is alert and oriented to person, place, and time. He has normal strength. No cranial nerve deficit or sensory deficit. He exhibits normal muscle tone. Gait (wheel chair bound) abnormal. Coordination normal. He displays no Babinski's sign on the right side. He displays no Babinski's sign on the left side. Reflex Scores:       Tricep reflexes are 2+ on the right side and 2+ on the left side. Bicep reflexes are 2+ on the right side and 2+ on the left side. Brachioradialis reflexes are 2+ on the right side and 2+ on the left side. Patellar reflexes are 0 on the right side and 0 on the left side. Achilles reflexes are 0 on the right side and 0 on the left side. Bilateral AKA   Skin: Skin is warm. No rash noted. He is not diaphoretic. Psychiatric: He has a normal mood and affect. His behavior is normal. Thought content normal.       Vitals:    07/09/18 1540   BP: (!) 147/87   Site: Left Arm   Position: Sitting   Cuff Size: Medium Adult   Pulse: 87   Weight: 167 lb (75.8 kg)       Body mass index is 21.44 kg/m². Pain Score:   8 /10    Goals of chronic pain therapy:      Multi-disciplinary comprehensive pain management with functional improvement and reduced opioid use. Prescription pain medication monitoring:      MEDD current = 0   ORT Score =  4 (MEDIUM)   Other Risk factors - COPD, h/o substance abuse and rehab - 20 years ago. OARRS:    Checked today: Yes    Adverse report: No   UDS:    Date of last UDS: 05/08/2018    Adverse report: Yes - positive for THC. Medication Effects -        Analgesia:      Average level of pain for the past month = 10/10     Percentage of pain relief = 75%     Activities Improved: Activities of daily living = 0%     Sleep = 0%     Mood = 0%     Social functioning = 0%     Adverse Effects: Any adverse effects: No     Type/Severity of side effect: None    Aberrant Behavior:     Requests for frequent early refills: No     Increased dose without authorization: No     Reports lost or stolen prescriptions: No     Attempts to obtain prescriptions from other providers: No     Use of pain medication in response to situational stressor: No      Increasingly unkempt or impaired: No     Negative mood changes: No     Abusing alcohol or illicit drugs: No     Appears intoxicated: No     Other: NA     Controlled Substances Monitoring:    Attestation: The Prescription Monitoring Report for this patient was reviewed today. Antonieta Geiger MD)  Documentation: Possible medication side effects, risk of tolerance/dependence & alternative treatments discussed. , Random urine drug screen sent today., No signs of potential drug abuse or diversion identified. Elisabet Henderson MD)      Overall Progress:       PEG Score = 9 / 10     Physical Therapy: N/A   Counseling: N/A   Injections: N/A     Patient compliance on plan of care:  Poor   Progress on current plan:  Poor    Assessment:    1. Phantom limb syndrome with pain (HCC)  Chronic  - baclofen (LIORESAL) 10 MG tablet; Take 1 tablet by mouth 2 times daily  Dispense: 60 tablet; Refill: 0  - buprenorphine (BUTRANS) 5 MCG/HR PTWK; Place 1 patch onto the skin once a week for 28 days. (for chronic pain). Dispense: 4 patch; Refill: 0    2. Other spondylosis, lumbar region  Chronic  - baclofen (LIORESAL) 10 MG tablet; Take 1 tablet by mouth 2 times daily  Dispense: 60 tablet; Refill: 0  - XR Lumbosacral Spine 2 or 3 VW; Future  - buprenorphine (BUTRANS) 5 MCG/HR PTWK; Place 1 patch onto the skin once a week for 28 days. (for chronic pain). Dispense: 4 patch; Refill: 0    3. Encounter for monitoring opioid maintenance therapy  - Drug Panel-PM-HI Res-UR Interp-A      Plan:     1. Chronic leg pain due to AKA and phantom limb syndrome. 2. Reports moderate help from combination of Neurontin, baclofen and Butrans patch. 3. He did not refill medications last month due to hospitalization for pneumonia. 4. Counseled him on limitation of opioid; will refill Butrans 5 mcg/hr patch q7d for chronic pain and repeat UDT. 5. He is concerned about left sided axial low back pain - will get XR lumbar spine as well. 6. Ongoing issues with the pneumonia; counseled him on side effects of opioid - he understands. Analgesic Plan:   Continue present regimen: Yes - Butrans 5 mcg/hr q7d   Adjust dose of present analgesic: No   Switch analgesics: No   Add/Adjust concomitant therapy: Yes - home exercise, XR lumbar    Education:     - Reviewed OARRS report in detail, including Narx Scores and Overdose Risk Score.     - Encouraged regular home exercises and

## 2018-07-09 NOTE — PATIENT INSTRUCTIONS
I have ordered XRay for the lower back. Continue baclofen and Butrans patch as provided. Patient Education        Learning About Managing Chronic Pain  What is a plan for pain management? A pain management plan helps you find ways to control pain with side effects you can live with. Some diseases and injuries can cause pain that lasts a long time. Constant pain can make you depressed. It can cause stress and make it hard for you to eat and sleep. But you don't need to live with uncontrolled pain. How can you plan for managing your pain? You and your doctor will work to make your plan. Your plan can include more than one type of pain control. You may take prescription or over-the-counter drugs. You can also try physical treatments, like massage and acupuncture. Other things can help too, such as meditation or a type of therapy to change how you think about your pain. It's important to let your doctor know how you prefer to control your pain. Sometimes the goal of a pain management plan isn't to totally get rid of pain. Instead, it might be to reduce the pain enough that daily activities are easier. If your pain isn't controlled well enough, talk with your doctor. You may need to make a new plan. Or your doctor may refer you to a specialist.  What medicines are used? Your doctor may prescribe medicine to help with your pain. If you aren't taking a prescription medicine, you may be able to take an over-the-counter one. There are four main types of medicine for chronic pain. · Non-opioids. These are things like aspirin, acetaminophen, and nonsteroidal anti-inflammatory drugs (NSAIDs). They work by blocking (or reducing) the feeling of pain. And some also reduce swelling. They usually relieve pain for 6 to 12 hours at a time. · Opioids. Morphine, codeine, and oxycodone are some examples. Opioids relieve pain by changing the way your body feels pain and the way you feel about pain. · Other medicines.  Some medicines seem to change the way your brain senses pain. These include things like:  ¨ Antidepressants, anti-seizure medicines, and anti-anxiety medicines. ¨ Nerve block injections. Medicines are the most common treatment for pain. But to feel better, you'll need to do more than take medicine. You can also do things like reducing your stress level and changing how you think. How can you take medicine safely? Medicines can help you get better. But they can also be dangerous, especially if you don't take them the right way. Be safe with medicines. Read and follow all instructions on the label. If the medicine you take causes side effects such as constipation or nausea, you may need to take other medicines for those problems. Talk to your doctor about any side effects you have. If you were prescribed an opioid pain reliever, your care team will give you information on how to use it safely. You will also get directions for how to safely store the medicine and how to get rid of any that's left over. Follow these instructions carefully. What physical treatments can help? Physical treatments can be an important part of managing chronic pain. You may find that combining more than one treatment helps the most.  These treatments can include:  · Heat or cold. This can help arthritis, sore muscles, and other aches. · Hydrotherapy. It uses flowing water to relax muscles. · Massage. Massage involves rubbing the soft tissues of the body. It eases tension and pain. · Transcutaneous electrical nerve stimulation (TENS). This treatment uses a gentle electric current applied to the skin for pain relief. · Acupuncture. This is a form of traditional [facility] medicine. It uses very thin needles inserted into certain points of the body. · Physical therapy. This treatment uses stretches and exercises to reduce pain and help you move better.   If you get physical therapy, make sure to do any home exercises or stretching your therapist has prescribed. Stay as active as you can. Try to get some physical activity every day. What other things can help? You can manage chronic pain by using things other than medicines or physical treatments. For example, you can keep track of your pain in a pain diary. It can help you understand how the things you do affect your pain. Reducing stress and tension can reduce pain. And being more aware of your thought patterns can be helpful. In some cases, shifting how you think about pain can affect how you feel. Here are some options to think about:  · Breathing exercises and meditation. These techniques can help you focus your attention, relax, and get rid of tension. · Guided imagery. This is a series of thoughts and images that can focus your attention away from your pain. · Hypnosis. It's a state of focused concentration that makes you less aware of your surroundings. · Cognitive behavioral therapy. This type of counseling helps you change your thought patterns. · Yoga. Stretching and exercises can reduce stress and improve flexibility. If what you're doing to control your pain isn't working, or if you're feeling depressed, talk to your doctor. He or she can help you change your pain management plan and find resources for emotional support. Where can you learn more? Go to https://[employer]. org and sign in to your [employer] account. Enter P119 in the EveryMove box to learn more about \"Learning About Managing Chronic Pain. \"     If you do not have an account, please click on the \"Sign Up Now\" link. Current as of: September 10, 2017  Content Version: 11.6  © 7242-4199 [employer]. Care instructions adapted under license by [facility] ([facility]). If you have questions about a medical condition or this instruction, always ask your healthcare professional. [address] any warranty or liability for your use of this information.

## 2018-07-12 LAB
6-ACETYLMORPHINE: NOT DETECTED
7-AMINOCLONAZEPAM: NOT DETECTED
ALPHA-OH-ALPRAZOLAM: NOT DETECTED
ALPRAZOLAM: NOT DETECTED
AMPHETAMINE: NOT DETECTED
BARBITURATES: NOT DETECTED
BENZOYLECGONINE: NOT DETECTED
BUPRENORPHINE: NOT DETECTED
CARISOPRODOL: NOT DETECTED
CLONAZEPAM: NOT DETECTED
CODEINE: NOT DETECTED
CREATININE URINE: 77.1 MG/DL (ref 20–400)
DIAZEPAM: NOT DETECTED
DRUGS EXPECTED: NORMAL
EER PAIN MGT DRUG PANEL, HIGH RES/EMIT U: NORMAL
ETHYL GLUCURONIDE: NOT DETECTED
FENTANYL: NOT DETECTED
HYDROCODONE: NOT DETECTED
HYDROMORPHONE: NOT DETECTED
LORAZEPAM: NOT DETECTED
MARIJUANA METABOLITE: PRESENT
MDA: NOT DETECTED
MDEA: NOT DETECTED
MDMA URINE: NOT DETECTED
MEPERIDINE: NOT DETECTED
METHADONE: NOT DETECTED
METHAMPHETAMINE: NOT DETECTED
METHYLPHENIDATE: NOT DETECTED
MIDAZOLAM: NOT DETECTED
MORPHINE: NOT DETECTED
NORBUPRENORPHINE, FREE: NOT DETECTED
NORDIAZEPAM: NOT DETECTED
NORFENTANYL: NOT DETECTED
NORHYDROCODONE, URINE: NOT DETECTED
NOROXYCODONE: NOT DETECTED
NOROXYMORPHONE, URINE: NOT DETECTED
OXAZEPAM: NOT DETECTED
OXYCODONE: NOT DETECTED
OXYMORPHONE: NOT DETECTED
PAIN MANAGEMENT DRUG PANEL: NORMAL
PAIN MANAGEMENT DRUG PANEL: NORMAL
PCP: NOT DETECTED
PHENTERMINE: NOT DETECTED
PROPOXYPHENE: NOT DETECTED
TAPENTADOL, URINE: NOT DETECTED
TAPENTADOL-O-SULFATE, URINE: NOT DETECTED
TEMAZEPAM: NOT DETECTED
TRAMADOL: NOT DETECTED
ZOLPIDEM: NOT DETECTED

## 2018-07-18 ENCOUNTER — OFFICE VISIT (OUTPATIENT)
Dept: PRIMARY CARE CLINIC | Age: 58
End: 2018-07-18

## 2018-07-18 VITALS
OXYGEN SATURATION: 97 % | SYSTOLIC BLOOD PRESSURE: 140 MMHG | TEMPERATURE: 97.8 F | HEART RATE: 99 BPM | DIASTOLIC BLOOD PRESSURE: 70 MMHG

## 2018-07-18 DIAGNOSIS — R52 PAIN DISORDER: ICD-10-CM

## 2018-07-18 DIAGNOSIS — F17.200 SMOKER: ICD-10-CM

## 2018-07-18 DIAGNOSIS — I10 ESSENTIAL HYPERTENSION: ICD-10-CM

## 2018-07-18 DIAGNOSIS — J44.9 CHRONIC OBSTRUCTIVE PULMONARY DISEASE, UNSPECIFIED COPD TYPE (HCC): ICD-10-CM

## 2018-07-18 PROCEDURE — G8427 DOCREV CUR MEDS BY ELIG CLIN: HCPCS | Performed by: INTERNAL MEDICINE

## 2018-07-18 PROCEDURE — G8420 CALC BMI NORM PARAMETERS: HCPCS | Performed by: INTERNAL MEDICINE

## 2018-07-18 PROCEDURE — 3017F COLORECTAL CA SCREEN DOC REV: CPT | Performed by: INTERNAL MEDICINE

## 2018-07-18 PROCEDURE — G8926 SPIRO NO PERF OR DOC: HCPCS | Performed by: INTERNAL MEDICINE

## 2018-07-18 PROCEDURE — 3023F SPIROM DOC REV: CPT | Performed by: INTERNAL MEDICINE

## 2018-07-18 PROCEDURE — 4004F PT TOBACCO SCREEN RCVD TLK: CPT | Performed by: INTERNAL MEDICINE

## 2018-07-18 PROCEDURE — 99213 OFFICE O/P EST LOW 20 MIN: CPT | Performed by: INTERNAL MEDICINE

## 2018-07-18 RX ORDER — TRAMADOL HYDROCHLORIDE 50 MG/1
50 TABLET ORAL EVERY 6 HOURS PRN
Qty: 28 TABLET | Refills: 0 | Status: SHIPPED | OUTPATIENT
Start: 2018-07-18 | End: 2018-07-25

## 2018-07-18 ASSESSMENT — ENCOUNTER SYMPTOMS
WHEEZING: 0
GASTROINTESTINAL NEGATIVE: 1
CHEST TIGHTNESS: 0
DIARRHEA: 0
BACK PAIN: 1
EYES NEGATIVE: 1
CONSTIPATION: 0
BLOOD IN STOOL: 0

## 2018-07-18 NOTE — PROGRESS NOTES
Subjective:      Patient ID: Altagracia Holland is a 59 y.o. male. 7/18/18 Patient presents with:  Shortness of Breath                  Last seen 6/21/18 6/5/18  Patient presents with: Follow-Up from Hospital: cough and chest tightness continues . Back pain continues       . Sent home on Zithromax + prednisone for Pneumonia     6/24/15  Dr Milagros Aj Patient presents first visit with:  Diabetes: need prescription for a new wheelchair          Hypertension   Pertinent negatives include no chest pain or headaches. Shortness of breath: Baseline. Diabetes   Pertinent negatives for hypoglycemia include no dizziness, headaches, nervousness/anxiousness or tremors. Pertinent negatives for diabetes include no chest pain and no weakness. Review of Systems   Constitutional: Negative for chills. Refuses all vaccines   HENT: Negative. Eyes: Negative. Negative for visual disturbance. Eye ex 3/16   Respiratory: Negative for chest tightness and wheezing. Cough: better. Shortness of breath: Baseline. Smokes 1-2 ppd ; was doing 4 ppd at one time  No Etoh ;      Marijuana at times    Cardiovascular: Negative. Negative for chest pain. PVD ; S/p leg amputation bilat  HTN on meds   No known CAD ? Gastrointestinal: Negative. Negative for blood in stool, constipation and diarrhea. Colonoscopy 2010 ; [facility]    Endocrine:        Diabetes 2012 ; was on  Insulin   Off ALL meds > 1 yr   Genitourinary: Negative for dysuria, frequency and urgency. Musculoskeletal: Positive for arthralgias and back pain. S/P Bilat leg amputations for PVD   Skin: Negative for rash. Neurological: Negative for dizziness, tremors, weakness, light-headedness and headaches. Psychiatric/Behavioral: Negative for behavioral problems and sleep disturbance. The patient is not nervous/anxious. Objective:   Physical Exam   Constitutional: He is oriented to person, place, and time. No distress.

## 2018-07-23 ENCOUNTER — TELEPHONE (OUTPATIENT)
Dept: ADMINISTRATIVE | Age: 58
End: 2018-07-23

## 2018-07-24 ENCOUNTER — TELEPHONE (OUTPATIENT)
Dept: PAIN MANAGEMENT | Age: 58
End: 2018-07-24

## 2018-07-24 NOTE — LETTER
referral or scheduling requests. If no new information is received within 30 days from this request, the referral will be canceled and will need to be resubmitted with the information requested above to be considered for new patient scheduling.      Thank you,     Pain Management Staff

## 2018-08-09 PROBLEM — M47.817 LUMBOSACRAL SPONDYLOSIS WITHOUT MYELOPATHY: Status: ACTIVE | Noted: 2018-08-09

## 2018-08-25 DIAGNOSIS — E11.9 DIET-CONTROLLED DIABETES MELLITUS (HCC): ICD-10-CM

## 2018-08-27 RX ORDER — ASPIRIN 325 MG
TABLET ORAL
Qty: 30 TABLET | Refills: 4 | Status: SHIPPED | OUTPATIENT
Start: 2018-08-27 | End: 2019-01-21 | Stop reason: SDUPTHER

## 2018-09-06 ENCOUNTER — OFFICE VISIT (OUTPATIENT)
Dept: PRIMARY CARE CLINIC | Age: 58
End: 2018-09-06

## 2018-09-06 VITALS — OXYGEN SATURATION: 96 % | SYSTOLIC BLOOD PRESSURE: 140 MMHG | HEART RATE: 96 BPM | DIASTOLIC BLOOD PRESSURE: 80 MMHG

## 2018-09-06 DIAGNOSIS — G54.6 PHANTOM LIMB SYNDROME WITH PAIN (HCC): ICD-10-CM

## 2018-09-06 DIAGNOSIS — J44.9 CHRONIC OBSTRUCTIVE PULMONARY DISEASE, UNSPECIFIED COPD TYPE (HCC): ICD-10-CM

## 2018-09-06 DIAGNOSIS — E11.9 DIABETES MELLITUS TYPE 2, DIET-CONTROLLED (HCC): ICD-10-CM

## 2018-09-06 DIAGNOSIS — I10 ESSENTIAL HYPERTENSION: ICD-10-CM

## 2018-09-06 DIAGNOSIS — F17.200 SMOKER: Primary | ICD-10-CM

## 2018-09-06 DIAGNOSIS — Z23 NEED FOR PROPHYLACTIC VACCINATION AGAINST STREPTOCOCCUS PNEUMONIAE (PNEUMOCOCCUS): ICD-10-CM

## 2018-09-06 PROCEDURE — 3017F COLORECTAL CA SCREEN DOC REV: CPT | Performed by: INTERNAL MEDICINE

## 2018-09-06 PROCEDURE — G8420 CALC BMI NORM PARAMETERS: HCPCS | Performed by: INTERNAL MEDICINE

## 2018-09-06 PROCEDURE — G8427 DOCREV CUR MEDS BY ELIG CLIN: HCPCS | Performed by: INTERNAL MEDICINE

## 2018-09-06 PROCEDURE — 2022F DILAT RTA XM EVC RTNOPTHY: CPT | Performed by: INTERNAL MEDICINE

## 2018-09-06 PROCEDURE — 3044F HG A1C LEVEL LT 7.0%: CPT | Performed by: INTERNAL MEDICINE

## 2018-09-06 PROCEDURE — 3023F SPIROM DOC REV: CPT | Performed by: INTERNAL MEDICINE

## 2018-09-06 PROCEDURE — G8926 SPIRO NO PERF OR DOC: HCPCS | Performed by: INTERNAL MEDICINE

## 2018-09-06 PROCEDURE — 4004F PT TOBACCO SCREEN RCVD TLK: CPT | Performed by: INTERNAL MEDICINE

## 2018-09-06 PROCEDURE — 99213 OFFICE O/P EST LOW 20 MIN: CPT | Performed by: INTERNAL MEDICINE

## 2018-09-06 RX ORDER — ALBUTEROL SULFATE 90 UG/1
2 AEROSOL, METERED RESPIRATORY (INHALATION) EVERY 4 HOURS PRN
Qty: 1 INHALER | Refills: 5 | Status: SHIPPED | OUTPATIENT
Start: 2018-09-06 | End: 2018-11-29 | Stop reason: SDUPTHER

## 2018-09-06 RX ORDER — GABAPENTIN 800 MG/1
800 TABLET ORAL 3 TIMES DAILY
Qty: 90 TABLET | Refills: 3 | Status: SHIPPED | OUTPATIENT
Start: 2018-09-06 | End: 2019-02-25

## 2018-09-06 RX ORDER — TRAMADOL HYDROCHLORIDE 50 MG/1
50 TABLET ORAL EVERY 6 HOURS PRN
Qty: 28 TABLET | Refills: 0 | Status: SHIPPED | OUTPATIENT
Start: 2018-09-06 | End: 2018-09-13

## 2018-09-06 RX ORDER — LOSARTAN POTASSIUM AND HYDROCHLOROTHIAZIDE 25; 100 MG/1; MG/1
1 TABLET ORAL DAILY
Qty: 30 TABLET | Refills: 3 | Status: SHIPPED | OUTPATIENT
Start: 2018-09-06 | End: 2018-11-29 | Stop reason: SDUPTHER

## 2018-09-06 RX ORDER — NEBIVOLOL 10 MG/1
10 TABLET ORAL DAILY
Qty: 30 TABLET | Refills: 5 | Status: SHIPPED | OUTPATIENT
Start: 2018-09-06 | End: 2018-11-29 | Stop reason: SDUPTHER

## 2018-09-06 RX ORDER — ROSUVASTATIN CALCIUM 5 MG/1
5 TABLET, COATED ORAL NIGHTLY
Qty: 30 TABLET | Refills: 5 | Status: SHIPPED | OUTPATIENT
Start: 2018-09-06 | End: 2018-11-29 | Stop reason: SDUPTHER

## 2018-09-06 RX ORDER — CLONIDINE HYDROCHLORIDE 0.3 MG/1
0.3 TABLET ORAL 3 TIMES DAILY PRN
Qty: 90 TABLET | Refills: 3 | Status: SHIPPED | OUTPATIENT
Start: 2018-09-06 | End: 2018-11-29 | Stop reason: SDUPTHER

## 2018-09-06 ASSESSMENT — ENCOUNTER SYMPTOMS
BLOOD IN STOOL: 0
CHEST TIGHTNESS: 0
CONSTIPATION: 0
GASTROINTESTINAL NEGATIVE: 1
EYES NEGATIVE: 1
BACK PAIN: 1
WHEEZING: 0
DIARRHEA: 0

## 2018-09-06 NOTE — PROGRESS NOTES
hours as needed for Wheezing              Anxiety and depression  -     escitalopram (LEXAPRO) 10 MG tablet; Take 1 tablet by mouth daily             Plan:          Self Management Goals    Know which medication is for what condition:   Know side effects of medications, and discuss with doctor   Discuss side effects and instructions on new medications. Barriers to medication compliance addressed. All patient questions answered. Pt voiced understanding. Know correct dose/frequency of medications  Take medications at the same time each day  Stay current on medication refills  If taking OTC's check with MD/pharmacy first about interactions  Monitor sugars and record time/meal  A1C goal 7.0 or less  LDL goal 601 or less  Systolic BP < or equal to 747  Diastolic BP < or equal to 85  Quota system, X  number of cigarettes per day  Nicoderm and or Chantix  Advised Flu and Pneumonia Vax      Exercise 3-5 times per week  Keep check of weight  Weighting machine    On a scale of 1 to 5 how confident are you in these goals?   3/5    Education materials givenDiabetic Education Referral   [phone]

## 2018-11-29 ENCOUNTER — OFFICE VISIT (OUTPATIENT)
Dept: PRIMARY CARE CLINIC | Age: 58
End: 2018-11-29
Payer: MEDICAID

## 2018-11-29 VITALS
HEART RATE: 104 BPM | SYSTOLIC BLOOD PRESSURE: 180 MMHG | HEIGHT: 74 IN | BODY MASS INDEX: 21.44 KG/M2 | DIASTOLIC BLOOD PRESSURE: 100 MMHG

## 2018-11-29 DIAGNOSIS — G54.6 PHANTOM LIMB PAIN (HCC): ICD-10-CM

## 2018-11-29 DIAGNOSIS — F41.9 ANXIETY AND DEPRESSION: ICD-10-CM

## 2018-11-29 DIAGNOSIS — J44.9 CHRONIC OBSTRUCTIVE PULMONARY DISEASE, UNSPECIFIED COPD TYPE (HCC): ICD-10-CM

## 2018-11-29 DIAGNOSIS — F32.A ANXIETY AND DEPRESSION: ICD-10-CM

## 2018-11-29 DIAGNOSIS — I10 ESSENTIAL HYPERTENSION: Primary | ICD-10-CM

## 2018-11-29 DIAGNOSIS — E11.9 DIABETES MELLITUS TYPE 2, DIET-CONTROLLED (HCC): ICD-10-CM

## 2018-11-29 LAB — HBA1C MFR BLD: 6.1 %

## 2018-11-29 PROCEDURE — 3023F SPIROM DOC REV: CPT | Performed by: INTERNAL MEDICINE

## 2018-11-29 PROCEDURE — G8420 CALC BMI NORM PARAMETERS: HCPCS | Performed by: INTERNAL MEDICINE

## 2018-11-29 PROCEDURE — 2022F DILAT RTA XM EVC RTNOPTHY: CPT | Performed by: INTERNAL MEDICINE

## 2018-11-29 PROCEDURE — 83036 HEMOGLOBIN GLYCOSYLATED A1C: CPT | Performed by: INTERNAL MEDICINE

## 2018-11-29 PROCEDURE — 3044F HG A1C LEVEL LT 7.0%: CPT | Performed by: INTERNAL MEDICINE

## 2018-11-29 PROCEDURE — G8427 DOCREV CUR MEDS BY ELIG CLIN: HCPCS | Performed by: INTERNAL MEDICINE

## 2018-11-29 PROCEDURE — G8484 FLU IMMUNIZE NO ADMIN: HCPCS | Performed by: INTERNAL MEDICINE

## 2018-11-29 PROCEDURE — 99213 OFFICE O/P EST LOW 20 MIN: CPT | Performed by: INTERNAL MEDICINE

## 2018-11-29 PROCEDURE — 4004F PT TOBACCO SCREEN RCVD TLK: CPT | Performed by: INTERNAL MEDICINE

## 2018-11-29 PROCEDURE — G8926 SPIRO NO PERF OR DOC: HCPCS | Performed by: INTERNAL MEDICINE

## 2018-11-29 PROCEDURE — 3017F COLORECTAL CA SCREEN DOC REV: CPT | Performed by: INTERNAL MEDICINE

## 2018-11-29 RX ORDER — ROSUVASTATIN CALCIUM 5 MG/1
5 TABLET, COATED ORAL NIGHTLY
Qty: 30 TABLET | Refills: 5 | Status: SHIPPED | OUTPATIENT
Start: 2018-11-29 | End: 2019-02-25 | Stop reason: SDUPTHER

## 2018-11-29 RX ORDER — CLONIDINE HYDROCHLORIDE 0.3 MG/1
0.3 TABLET ORAL 3 TIMES DAILY PRN
Qty: 90 TABLET | Refills: 3 | Status: SHIPPED | OUTPATIENT
Start: 2018-11-29 | End: 2019-01-02 | Stop reason: SDUPTHER

## 2018-11-29 RX ORDER — ESCITALOPRAM OXALATE 10 MG/1
10 TABLET ORAL DAILY
Qty: 30 TABLET | Refills: 6 | Status: SHIPPED | OUTPATIENT
Start: 2018-11-29 | End: 2019-02-25

## 2018-11-29 RX ORDER — ALBUTEROL SULFATE 90 UG/1
2 AEROSOL, METERED RESPIRATORY (INHALATION) EVERY 4 HOURS PRN
Qty: 1 INHALER | Refills: 5 | Status: SHIPPED | OUTPATIENT
Start: 2018-11-29 | End: 2019-02-25 | Stop reason: SDUPTHER

## 2018-11-29 RX ORDER — NEBIVOLOL 20 MG/1
20 TABLET ORAL DAILY
Qty: 90 TABLET | Refills: 1 | Status: SHIPPED | OUTPATIENT
Start: 2018-11-29 | End: 2019-01-02 | Stop reason: SDUPTHER

## 2018-11-29 RX ORDER — TRAMADOL HYDROCHLORIDE 50 MG/1
50 TABLET ORAL EVERY 4 HOURS PRN
Qty: 40 TABLET | Refills: 0 | Status: SHIPPED | OUTPATIENT
Start: 2018-11-29 | End: 2018-12-09

## 2018-11-29 RX ORDER — LOSARTAN POTASSIUM AND HYDROCHLOROTHIAZIDE 25; 100 MG/1; MG/1
1 TABLET ORAL DAILY
Qty: 30 TABLET | Refills: 3 | Status: SHIPPED | OUTPATIENT
Start: 2018-11-29 | End: 2019-01-02 | Stop reason: SDUPTHER

## 2018-11-29 ASSESSMENT — ENCOUNTER SYMPTOMS
BLOOD IN STOOL: 0
CHEST TIGHTNESS: 0
CONSTIPATION: 0
DIARRHEA: 0
BACK PAIN: 1
EYES NEGATIVE: 1
WHEEZING: 0
GASTROINTESTINAL NEGATIVE: 1

## 2018-11-29 NOTE — PROGRESS NOTES
Subjective:      Patient ID: Fredi Figueroa is a 59 y.o. male. 11/29/18 Patient presents with:  Medication Check: needs something for the leg pain   Leg Pain: right   Letter for School/Work: handicap placard            Last seen  9/6/18 Patient presents with:  Check-Up  Pain . Got discharged from Pain Clinic b/e Marijuana! Continues to smoke 2 ppd + Marijuana           6/24/15  Dr Ethan Reid Patient presents first visit with:  Diabetes: need prescription for a new wheelchair          Hypertension   Pertinent negatives include no chest pain or headaches. Shortness of breath: Baseline. Diabetes   Pertinent negatives for hypoglycemia include no dizziness, headaches, nervousness/anxiousness or tremors. Pertinent negatives for diabetes include no chest pain and no weakness. Leg Pain          Review of Systems   Constitutional: Negative for chills. Refuses all vaccines   HENT: Negative. Eyes: Negative. Negative for visual disturbance. Eye ex 3/16   Respiratory: Negative for chest tightness and wheezing. Cough: better. Shortness of breath: Baseline. Smokes 1-2 ppd ; was doing 4 ppd at one time  No Etoh ;      Marijuana at times    Cardiovascular: Negative. Negative for chest pain. PVD ; S/p leg amputation bilat  HTN on meds   No known CAD ? Gastrointestinal: Negative. Negative for blood in stool, constipation and diarrhea. Colonoscopy 2010 ; [facility]    Endocrine:        Diabetes 2012 ; was on  Insulin   Off ALL meds > 1 yr   Genitourinary: Negative for dysuria, frequency and urgency. Musculoskeletal: Positive for arthralgias and back pain. S/P Bilat leg amputations for PVD   Skin: Negative for rash. Neurological: Negative for dizziness, tremors, weakness, light-headedness and headaches. Psychiatric/Behavioral: Negative for behavioral problems and sleep disturbance. The patient is not nervous/anxious.         Objective:   Physical Exam   Constitutional: He is oriented to person, place, and time. No distress. Cardiovascular: Normal rate, regular rhythm and normal heart sounds. Pulmonary/Chest: He has no wheezes. Reduced BS   Musculoskeletal:   Bilat leg amputee; in a wheel chair    Neurological: He is alert and oriented to person, place, and time. He has normal strength. Skin: Skin is warm. Psychiatric: His mood appears anxious. Vitals reviewed. Assessment:         Maryann Olivas was seen today for medication check, leg pain, letter for school/work and hypertension. Diagnoses and all orders for this visit:    Essential hypertension   bP up aggravated with pain . Increase Bystolic to 20 mg / d . Cont clonidine + Hyzaar   -     nebivolol (BYSTOLIC) 20 MG TABS tablet; Take 1 tablet by mouth daily  -     cloNIDine (CATAPRES) 0.3 MG tablet; Take 1 tablet by mouth 3 times daily as needed (pain)  -     losartan-hydrochlorothiazide (HYZAAR) 100-25 MG per tablet; Take 1 tablet by mouth daily    Diabetes mellitus type 2, diet-controlled (HCC)  A1C 5.9   -     POCT glycosylated hemoglobin (Hb A1C)  -     rosuvastatin (CRESTOR) 5 MG tablet; Take 1 tablet by mouth nightly    Anxiety and depression  -     escitalopram (LEXAPRO) 10 MG tablet; Take 1 tablet by mouth daily    Chronic obstructive pulmonary disease, unspecified COPD type (HCC)  -     fluticasone-salmeterol (ADVAIR DISKUS) 500-50 MCG/DOSE diskus inhaler; Inhale 1 puff into the lungs every 12 hours  -     albuterol sulfate HFA (PROAIR HFA) 108 (90 Base) MCG/ACT inhaler; Inhale 2 puffs into the lungs every 4 hours as needed for Wheezing    Phantom limb pain (HCC)  Must get into Pain Management for ch pain meds   -     traMADol (ULTRAM) 50 MG tablet; Take 1 tablet by mouth every 4 hours as needed for Pain for up to 10 days. Intended supply: 3 days. Take lowest dose possible to manage pain.         Smoker  Cautioned again         Plan:          Self Management Goals    Know which medication is for what condition: Know side effects of medications, and discuss with doctor   Discuss side effects and instructions on new medications. Barriers to medication compliance addressed. All patient questions answered. Pt voiced understanding. Know correct dose/frequency of medications  Take medications at the same time each day  Stay current on medication refills  If taking OTC's check with MD/pharmacy first about interactions  Monitor sugars and record time/meal  A1C goal 7.0 or less  LDL goal 181 or less  Systolic BP < or equal to 959  Diastolic BP < or equal to 85  Quota system, X  number of cigarettes per day  Nicoderm and or Chantix  Advised Flu and Pneumonia Vax      Exercise 3-5 times per week  Keep check of weight  Weighting machine    On a scale of 1 to 5 how confident are you in these goals?   3/5    Education materials givenDiabetic Education Referral   [phone]

## 2019-01-02 ENCOUNTER — TELEPHONE (OUTPATIENT)
Dept: PRIMARY CARE CLINIC | Age: 59
End: 2019-01-02

## 2019-01-02 ENCOUNTER — OFFICE VISIT (OUTPATIENT)
Dept: PRIMARY CARE CLINIC | Age: 59
End: 2019-01-02
Payer: MEDICAID

## 2019-01-02 VITALS
OXYGEN SATURATION: 96 % | HEART RATE: 85 BPM | TEMPERATURE: 98.9 F | SYSTOLIC BLOOD PRESSURE: 140 MMHG | DIASTOLIC BLOOD PRESSURE: 80 MMHG

## 2019-01-02 DIAGNOSIS — F17.200 SMOKER: ICD-10-CM

## 2019-01-02 DIAGNOSIS — G54.6 PHANTOM LIMB PAIN (HCC): ICD-10-CM

## 2019-01-02 DIAGNOSIS — I10 ESSENTIAL HYPERTENSION: ICD-10-CM

## 2019-01-02 PROCEDURE — G8420 CALC BMI NORM PARAMETERS: HCPCS | Performed by: INTERNAL MEDICINE

## 2019-01-02 PROCEDURE — 3017F COLORECTAL CA SCREEN DOC REV: CPT | Performed by: INTERNAL MEDICINE

## 2019-01-02 PROCEDURE — G8484 FLU IMMUNIZE NO ADMIN: HCPCS | Performed by: INTERNAL MEDICINE

## 2019-01-02 PROCEDURE — G8427 DOCREV CUR MEDS BY ELIG CLIN: HCPCS | Performed by: INTERNAL MEDICINE

## 2019-01-02 PROCEDURE — 4004F PT TOBACCO SCREEN RCVD TLK: CPT | Performed by: INTERNAL MEDICINE

## 2019-01-02 PROCEDURE — 99214 OFFICE O/P EST MOD 30 MIN: CPT | Performed by: INTERNAL MEDICINE

## 2019-01-02 RX ORDER — LOSARTAN POTASSIUM AND HYDROCHLOROTHIAZIDE 25; 100 MG/1; MG/1
1 TABLET ORAL DAILY
Qty: 30 TABLET | Refills: 3 | Status: SHIPPED | OUTPATIENT
Start: 2019-01-02 | End: 2019-02-25 | Stop reason: SDUPTHER

## 2019-01-02 RX ORDER — CLONIDINE HYDROCHLORIDE 0.3 MG/1
0.3 TABLET ORAL 3 TIMES DAILY PRN
Qty: 90 TABLET | Refills: 3 | Status: SHIPPED | OUTPATIENT
Start: 2019-01-02 | End: 2019-02-25

## 2019-01-02 RX ORDER — NEBIVOLOL 20 MG/1
20 TABLET ORAL DAILY
Qty: 90 TABLET | Refills: 1 | Status: SHIPPED | OUTPATIENT
Start: 2019-01-02 | End: 2019-02-25

## 2019-01-02 RX ORDER — TRAMADOL HYDROCHLORIDE 50 MG/1
50 TABLET ORAL EVERY 8 HOURS PRN
Qty: 40 TABLET | Refills: 0 | Status: SHIPPED | OUTPATIENT
Start: 2019-01-02 | End: 2019-02-01

## 2019-01-02 RX ORDER — LIDOCAINE 50 MG/G
1-2 PATCH TOPICAL DAILY
Qty: 60 PATCH | Refills: 5 | Status: SHIPPED | OUTPATIENT
Start: 2019-01-02 | End: 2019-02-01

## 2019-01-02 ASSESSMENT — ENCOUNTER SYMPTOMS
WHEEZING: 0
CHEST TIGHTNESS: 0
GASTROINTESTINAL NEGATIVE: 1
BACK PAIN: 1
BLOOD IN STOOL: 0
DIARRHEA: 0
CONSTIPATION: 0
EYES NEGATIVE: 1

## 2019-01-06 ENCOUNTER — HOSPITAL ENCOUNTER (EMERGENCY)
Age: 59
Discharge: HOME | End: 2019-01-06
Attending: EMERGENCY MEDICINE
Payer: MEDICAID

## 2019-01-06 ENCOUNTER — APPOINTMENT (OUTPATIENT)
Dept: GENERAL RADIOLOGY | Age: 59
End: 2019-01-06
Payer: MEDICAID

## 2019-01-06 VITALS
WEIGHT: 190.48 LBS | RESPIRATION RATE: 16 BRPM | DIASTOLIC BLOOD PRESSURE: 73 MMHG | TEMPERATURE: 98.3 F | HEART RATE: 84 BPM | OXYGEN SATURATION: 96 % | BODY MASS INDEX: 24.46 KG/M2 | SYSTOLIC BLOOD PRESSURE: 126 MMHG

## 2019-01-06 DIAGNOSIS — M25.512 LEFT SHOULDER PAIN, UNSPECIFIED CHRONICITY: Primary | ICD-10-CM

## 2019-01-06 PROCEDURE — 96372 THER/PROPH/DIAG INJ SC/IM: CPT

## 2019-01-06 PROCEDURE — 93005 ELECTROCARDIOGRAM TRACING: CPT | Performed by: EMERGENCY MEDICINE

## 2019-01-06 PROCEDURE — 99283 EMERGENCY DEPT VISIT LOW MDM: CPT

## 2019-01-06 PROCEDURE — 6370000000 HC RX 637 (ALT 250 FOR IP): Performed by: NURSE PRACTITIONER

## 2019-01-06 PROCEDURE — 6360000002 HC RX W HCPCS: Performed by: NURSE PRACTITIONER

## 2019-01-06 PROCEDURE — 73030 X-RAY EXAM OF SHOULDER: CPT

## 2019-01-06 RX ORDER — ORPHENADRINE CITRATE 30 MG/ML
60 INJECTION INTRAMUSCULAR; INTRAVENOUS ONCE
Status: COMPLETED | OUTPATIENT
Start: 2019-01-06 | End: 2019-01-06

## 2019-01-06 RX ORDER — CYCLOBENZAPRINE HCL 10 MG
5 TABLET ORAL 3 TIMES DAILY PRN
Qty: 20 TABLET | Refills: 0 | Status: SHIPPED | OUTPATIENT
Start: 2019-01-06 | End: 2019-01-13

## 2019-01-06 RX ORDER — HYDROCODONE BITARTRATE AND ACETAMINOPHEN 5; 325 MG/1; MG/1
1 TABLET ORAL EVERY 6 HOURS PRN
Qty: 10 TABLET | Refills: 0 | Status: SHIPPED | OUTPATIENT
Start: 2019-01-06 | End: 2019-01-09

## 2019-01-06 RX ORDER — HYDROCODONE BITARTRATE AND ACETAMINOPHEN 5; 325 MG/1; MG/1
1 TABLET ORAL ONCE
Status: COMPLETED | OUTPATIENT
Start: 2019-01-06 | End: 2019-01-06

## 2019-01-06 RX ADMIN — ORPHENADRINE CITRATE 60 MG: 30 INJECTION INTRAMUSCULAR; INTRAVENOUS at 14:33

## 2019-01-06 RX ADMIN — HYDROCODONE BITARTRATE AND ACETAMINOPHEN 1 TABLET: 5; 325 TABLET ORAL at 14:33

## 2019-01-06 ASSESSMENT — ENCOUNTER SYMPTOMS
RESPIRATORY NEGATIVE: 1
GASTROINTESTINAL NEGATIVE: 1

## 2019-01-06 ASSESSMENT — PAIN SCALES - GENERAL
PAINLEVEL_OUTOF10: 10
PAINLEVEL_OUTOF10: 8

## 2019-01-06 ASSESSMENT — PAIN - FUNCTIONAL ASSESSMENT: PAIN_FUNCTIONAL_ASSESSMENT: 0-10

## 2019-01-06 ASSESSMENT — PAIN DESCRIPTION - LOCATION: LOCATION: SHOULDER

## 2019-01-06 ASSESSMENT — PAIN DESCRIPTION - DESCRIPTORS: DESCRIPTORS: SHARP;STABBING

## 2019-01-06 ASSESSMENT — PAIN DESCRIPTION - ORIENTATION: ORIENTATION: LEFT

## 2019-01-06 ASSESSMENT — PAIN DESCRIPTION - PAIN TYPE: TYPE: ACUTE PAIN

## 2019-01-07 LAB
EKG ATRIAL RATE: 99 BPM
EKG DIAGNOSIS: NORMAL
EKG P AXIS: 59 DEGREES
EKG P-R INTERVAL: 194 MS
EKG Q-T INTERVAL: 364 MS
EKG QRS DURATION: 102 MS
EKG QTC CALCULATION (BAZETT): 467 MS
EKG R AXIS: -25 DEGREES
EKG T AXIS: 40 DEGREES
EKG VENTRICULAR RATE: 99 BPM

## 2019-01-07 PROCEDURE — 93010 ELECTROCARDIOGRAM REPORT: CPT | Performed by: INTERNAL MEDICINE

## 2019-01-21 DIAGNOSIS — E11.9 DIET-CONTROLLED DIABETES MELLITUS (HCC): ICD-10-CM

## 2019-01-22 RX ORDER — ASPIRIN 325 MG
TABLET ORAL
Qty: 30 TABLET | Refills: 3 | Status: SHIPPED | OUTPATIENT
Start: 2019-01-22 | End: 2019-05-21 | Stop reason: SDUPTHER

## 2019-01-27 DIAGNOSIS — K21.9 GASTROESOPHAGEAL REFLUX DISEASE WITHOUT ESOPHAGITIS: ICD-10-CM

## 2019-01-29 RX ORDER — OMEPRAZOLE 20 MG/1
CAPSULE, DELAYED RELEASE ORAL
Qty: 30 CAPSULE | Refills: 4 | Status: SHIPPED | OUTPATIENT
Start: 2019-01-29 | End: 2019-07-02 | Stop reason: SDUPTHER

## 2019-02-25 ENCOUNTER — OFFICE VISIT (OUTPATIENT)
Dept: PRIMARY CARE CLINIC | Age: 59
End: 2019-02-25
Payer: MEDICAID

## 2019-02-25 VITALS — HEART RATE: 82 BPM | TEMPERATURE: 97.1 F | SYSTOLIC BLOOD PRESSURE: 170 MMHG | DIASTOLIC BLOOD PRESSURE: 100 MMHG

## 2019-02-25 DIAGNOSIS — E11.9 DIABETES MELLITUS TYPE 2, DIET-CONTROLLED (HCC): ICD-10-CM

## 2019-02-25 DIAGNOSIS — F41.9 ANXIETY AND DEPRESSION: ICD-10-CM

## 2019-02-25 DIAGNOSIS — F32.A ANXIETY AND DEPRESSION: ICD-10-CM

## 2019-02-25 DIAGNOSIS — G54.6 PHANTOM LIMB SYNDROME WITH PAIN (HCC): ICD-10-CM

## 2019-02-25 DIAGNOSIS — J44.9 CHRONIC OBSTRUCTIVE PULMONARY DISEASE, UNSPECIFIED COPD TYPE (HCC): ICD-10-CM

## 2019-02-25 DIAGNOSIS — I10 ESSENTIAL HYPERTENSION: ICD-10-CM

## 2019-02-25 LAB
A/G RATIO: 1.6 (ref 1.1–2.2)
ALBUMIN SERPL-MCNC: 4.4 G/DL (ref 3.4–5)
ALP BLD-CCNC: 74 U/L (ref 40–129)
ALT SERPL-CCNC: 21 U/L (ref 10–40)
AMPHETAMINE SCREEN, URINE: ABNORMAL
ANION GAP SERPL CALCULATED.3IONS-SCNC: 15 MMOL/L (ref 3–16)
AST SERPL-CCNC: 19 U/L (ref 15–37)
BARBITURATE SCREEN URINE: ABNORMAL
BASOPHILS ABSOLUTE: 0.1 K/UL (ref 0–0.2)
BASOPHILS RELATIVE PERCENT: 0.7 %
BENZODIAZEPINE SCREEN, URINE: ABNORMAL
BILIRUB SERPL-MCNC: 0.4 MG/DL (ref 0–1)
BILIRUBIN URINE: NEGATIVE
BLOOD, URINE: NEGATIVE
BUN BLDV-MCNC: 8 MG/DL (ref 7–20)
CALCIUM SERPL-MCNC: 9.7 MG/DL (ref 8.3–10.6)
CANNABINOID SCREEN URINE: POSITIVE
CHLORIDE BLD-SCNC: 95 MMOL/L (ref 99–110)
CHOLESTEROL, TOTAL: 289 MG/DL (ref 0–199)
CLARITY: CLEAR
CO2: 24 MMOL/L (ref 21–32)
COCAINE METABOLITE SCREEN URINE: ABNORMAL
COLOR: YELLOW
CREAT SERPL-MCNC: 0.6 MG/DL (ref 0.8–1.3)
EOSINOPHILS ABSOLUTE: 0.1 K/UL (ref 0–0.6)
EOSINOPHILS RELATIVE PERCENT: 1.5 %
GFR AFRICAN AMERICAN: >60
GFR NON-AFRICAN AMERICAN: >60
GLOBULIN: 2.7 G/DL
GLUCOSE BLD-MCNC: 92 MG/DL (ref 70–99)
GLUCOSE URINE: NEGATIVE MG/DL
HCT VFR BLD CALC: 50.4 % (ref 40.5–52.5)
HDLC SERPL-MCNC: 27 MG/DL (ref 40–60)
HEMOGLOBIN: 17 G/DL (ref 13.5–17.5)
KETONES, URINE: NEGATIVE MG/DL
LDL CHOLESTEROL CALCULATED: ABNORMAL MG/DL
LDL CHOLESTEROL DIRECT: 159 MG/DL
LEUKOCYTE ESTERASE, URINE: NEGATIVE
LYMPHOCYTES ABSOLUTE: 1.8 K/UL (ref 1–5.1)
LYMPHOCYTES RELATIVE PERCENT: 20.9 %
Lab: ABNORMAL
MCH RBC QN AUTO: 30.7 PG (ref 26–34)
MCHC RBC AUTO-ENTMCNC: 33.7 G/DL (ref 31–36)
MCV RBC AUTO: 91.3 FL (ref 80–100)
METHADONE SCREEN, URINE: ABNORMAL
MICROSCOPIC EXAMINATION: NORMAL
MONOCYTES ABSOLUTE: 0.7 K/UL (ref 0–1.3)
MONOCYTES RELATIVE PERCENT: 8.3 %
NEUTROPHILS ABSOLUTE: 6 K/UL (ref 1.7–7.7)
NEUTROPHILS RELATIVE PERCENT: 68.6 %
NITRITE, URINE: NEGATIVE
OPIATE SCREEN URINE: ABNORMAL
OXYCODONE URINE: ABNORMAL
PDW BLD-RTO: 14.2 % (ref 12.4–15.4)
PH UA: 7
PH UA: 7
PHENCYCLIDINE SCREEN URINE: ABNORMAL
PLATELET # BLD: 331 K/UL (ref 135–450)
PMV BLD AUTO: 8.1 FL (ref 5–10.5)
POTASSIUM SERPL-SCNC: 4.8 MMOL/L (ref 3.5–5.1)
PROPOXYPHENE SCREEN: ABNORMAL
PROTEIN UA: NEGATIVE MG/DL
RBC # BLD: 5.52 M/UL (ref 4.2–5.9)
SLIDE REVIEW: NORMAL
SODIUM BLD-SCNC: 134 MMOL/L (ref 136–145)
SPECIFIC GRAVITY UA: 1.01
TOTAL PROTEIN: 7.1 G/DL (ref 6.4–8.2)
TRIGL SERPL-MCNC: 670 MG/DL (ref 0–150)
TSH SERPL DL<=0.05 MIU/L-ACNC: 3.17 UIU/ML (ref 0.27–4.2)
URINE TYPE: NORMAL
UROBILINOGEN, URINE: 0.2 E.U./DL
VLDLC SERPL CALC-MCNC: ABNORMAL MG/DL
WBC # BLD: 8.8 K/UL (ref 4–11)

## 2019-02-25 PROCEDURE — G8926 SPIRO NO PERF OR DOC: HCPCS | Performed by: INTERNAL MEDICINE

## 2019-02-25 PROCEDURE — G8484 FLU IMMUNIZE NO ADMIN: HCPCS | Performed by: INTERNAL MEDICINE

## 2019-02-25 PROCEDURE — 2022F DILAT RTA XM EVC RTNOPTHY: CPT | Performed by: INTERNAL MEDICINE

## 2019-02-25 PROCEDURE — 3017F COLORECTAL CA SCREEN DOC REV: CPT | Performed by: INTERNAL MEDICINE

## 2019-02-25 PROCEDURE — 3046F HEMOGLOBIN A1C LEVEL >9.0%: CPT | Performed by: INTERNAL MEDICINE

## 2019-02-25 PROCEDURE — 3023F SPIROM DOC REV: CPT | Performed by: INTERNAL MEDICINE

## 2019-02-25 PROCEDURE — 4004F PT TOBACCO SCREEN RCVD TLK: CPT | Performed by: INTERNAL MEDICINE

## 2019-02-25 PROCEDURE — 99214 OFFICE O/P EST MOD 30 MIN: CPT | Performed by: INTERNAL MEDICINE

## 2019-02-25 PROCEDURE — G8420 CALC BMI NORM PARAMETERS: HCPCS | Performed by: INTERNAL MEDICINE

## 2019-02-25 PROCEDURE — G8427 DOCREV CUR MEDS BY ELIG CLIN: HCPCS | Performed by: INTERNAL MEDICINE

## 2019-02-25 RX ORDER — PREGABALIN 100 MG/1
100 CAPSULE ORAL 3 TIMES DAILY
Qty: 90 CAPSULE | Refills: 3 | Status: SHIPPED | OUTPATIENT
Start: 2019-02-25 | End: 2019-03-26 | Stop reason: SDUPTHER

## 2019-02-25 RX ORDER — LABETALOL 300 MG/1
300 TABLET, FILM COATED ORAL 2 TIMES DAILY
Qty: 60 TABLET | Refills: 3 | Status: SHIPPED | OUTPATIENT
Start: 2019-02-25 | End: 2019-03-26 | Stop reason: SDUPTHER

## 2019-02-25 RX ORDER — TRAMADOL HYDROCHLORIDE 50 MG/1
50 TABLET ORAL EVERY 6 HOURS PRN
Qty: 90 TABLET | Refills: 0 | Status: SHIPPED | OUTPATIENT
Start: 2019-02-25 | End: 2019-03-27

## 2019-02-25 RX ORDER — ALBUTEROL SULFATE 90 UG/1
2 AEROSOL, METERED RESPIRATORY (INHALATION) EVERY 4 HOURS PRN
Qty: 1 INHALER | Refills: 5 | Status: SHIPPED | OUTPATIENT
Start: 2019-02-25 | End: 2019-12-19 | Stop reason: SDUPTHER

## 2019-02-25 RX ORDER — DULOXETIN HYDROCHLORIDE 60 MG/1
60 CAPSULE, DELAYED RELEASE ORAL DAILY
Qty: 30 CAPSULE | Refills: 5 | Status: SHIPPED | OUTPATIENT
Start: 2019-02-25 | End: 2019-03-26 | Stop reason: SDUPTHER

## 2019-02-25 RX ORDER — LOSARTAN POTASSIUM AND HYDROCHLOROTHIAZIDE 25; 100 MG/1; MG/1
1 TABLET ORAL DAILY
Qty: 30 TABLET | Refills: 3 | Status: SHIPPED | OUTPATIENT
Start: 2019-02-25 | End: 2019-03-26 | Stop reason: SDUPTHER

## 2019-02-25 RX ORDER — ROSUVASTATIN CALCIUM 5 MG/1
5 TABLET, COATED ORAL NIGHTLY
Qty: 30 TABLET | Refills: 5 | Status: SHIPPED | OUTPATIENT
Start: 2019-02-25 | End: 2019-03-26

## 2019-02-25 ASSESSMENT — ENCOUNTER SYMPTOMS
CONSTIPATION: 0
DIARRHEA: 0
CHEST TIGHTNESS: 0
WHEEZING: 0
EYES NEGATIVE: 1
BACK PAIN: 1
BLOOD IN STOOL: 0
GASTROINTESTINAL NEGATIVE: 1

## 2019-02-26 LAB
ESTIMATED AVERAGE GLUCOSE: 134.1 MG/DL
HBA1C MFR BLD: 6.3 %

## 2019-03-05 ENCOUNTER — TELEPHONE (OUTPATIENT)
Dept: PAIN MANAGEMENT | Age: 59
End: 2019-03-05

## 2019-03-08 DIAGNOSIS — E11.9 DIET-CONTROLLED DIABETES MELLITUS (HCC): ICD-10-CM

## 2019-03-08 RX ORDER — ATORVASTATIN CALCIUM 40 MG/1
TABLET, FILM COATED ORAL
Qty: 30 TABLET | Refills: 5 | Status: SHIPPED | OUTPATIENT
Start: 2019-03-08 | End: 2019-03-26

## 2019-03-22 ENCOUNTER — HOSPITAL ENCOUNTER (EMERGENCY)
Age: 59
Discharge: HOME | End: 2019-03-22
Payer: MEDICAID

## 2019-03-22 VITALS
RESPIRATION RATE: 14 BRPM | SYSTOLIC BLOOD PRESSURE: 181 MMHG | HEART RATE: 87 BPM | OXYGEN SATURATION: 97 % | DIASTOLIC BLOOD PRESSURE: 77 MMHG | TEMPERATURE: 97.6 F

## 2019-03-22 DIAGNOSIS — S39.012A BACK STRAIN, INITIAL ENCOUNTER: Primary | ICD-10-CM

## 2019-03-22 LAB
BILIRUBIN URINE: NEGATIVE
BLOOD, URINE: NEGATIVE
CLARITY: CLEAR
COLOR: YELLOW
GLUCOSE URINE: NEGATIVE MG/DL
KETONES, URINE: NEGATIVE MG/DL
LEUKOCYTE ESTERASE, URINE: NEGATIVE
MICROSCOPIC EXAMINATION: ABNORMAL
NITRITE, URINE: NEGATIVE
PH UA: 8.5 (ref 5–8)
PROTEIN UA: NEGATIVE MG/DL
SPECIFIC GRAVITY UA: 1.01 (ref 1–1.03)
URINE REFLEX TO CULTURE: ABNORMAL
URINE TYPE: ABNORMAL
UROBILINOGEN, URINE: 0.2 E.U./DL

## 2019-03-22 PROCEDURE — 81003 URINALYSIS AUTO W/O SCOPE: CPT

## 2019-03-22 PROCEDURE — 6370000000 HC RX 637 (ALT 250 FOR IP): Performed by: PHYSICIAN ASSISTANT

## 2019-03-22 PROCEDURE — 99283 EMERGENCY DEPT VISIT LOW MDM: CPT

## 2019-03-22 RX ORDER — HYDROCODONE BITARTRATE AND ACETAMINOPHEN 5; 325 MG/1; MG/1
1 TABLET ORAL ONCE
Status: COMPLETED | OUTPATIENT
Start: 2019-03-22 | End: 2019-03-22

## 2019-03-22 RX ORDER — TRAMADOL HYDROCHLORIDE 50 MG/1
50 TABLET ORAL EVERY 6 HOURS PRN
Qty: 10 TABLET | Refills: 0 | Status: SHIPPED | OUTPATIENT
Start: 2019-03-22 | End: 2019-03-25

## 2019-03-22 RX ADMIN — HYDROCODONE BITARTRATE AND ACETAMINOPHEN 1 TABLET: 5; 325 TABLET ORAL at 11:57

## 2019-03-22 ASSESSMENT — ENCOUNTER SYMPTOMS
SHORTNESS OF BREATH: 0
CONSTIPATION: 0
DIARRHEA: 0
ABDOMINAL PAIN: 0
CHEST TIGHTNESS: 0
ALLERGIC/IMMUNOLOGIC NEGATIVE: 1
COUGH: 0
VOMITING: 0
WHEEZING: 0
NAUSEA: 0
EYES NEGATIVE: 1

## 2019-03-22 ASSESSMENT — PAIN SCALES - GENERAL
PAINLEVEL_OUTOF10: 10
PAINLEVEL_OUTOF10: 8
PAINLEVEL_OUTOF10: 8
PAINLEVEL_OUTOF10: 10
PAINLEVEL_OUTOF10: 8
PAINLEVEL_OUTOF10: 8

## 2019-03-22 ASSESSMENT — PAIN DESCRIPTION - ONSET: ONSET: ON-GOING

## 2019-03-22 ASSESSMENT — PAIN DESCRIPTION - ORIENTATION: ORIENTATION: LEFT;LOWER

## 2019-03-22 ASSESSMENT — PAIN DESCRIPTION - DESCRIPTORS: DESCRIPTORS: ACHING

## 2019-03-22 ASSESSMENT — PAIN DESCRIPTION - PROGRESSION: CLINICAL_PROGRESSION: GRADUALLY WORSENING

## 2019-03-22 ASSESSMENT — PAIN DESCRIPTION - FREQUENCY: FREQUENCY: CONTINUOUS

## 2019-03-22 ASSESSMENT — PAIN DESCRIPTION - LOCATION: LOCATION: BACK

## 2019-03-22 ASSESSMENT — PAIN DESCRIPTION - PAIN TYPE: TYPE: ACUTE PAIN

## 2019-03-26 ENCOUNTER — TELEPHONE (OUTPATIENT)
Dept: PRIMARY CARE CLINIC | Age: 59
End: 2019-03-26

## 2019-03-26 ENCOUNTER — HOSPITAL ENCOUNTER (OUTPATIENT)
Age: 59
Discharge: HOME | End: 2019-03-26
Payer: MEDICAID

## 2019-03-26 ENCOUNTER — OFFICE VISIT (OUTPATIENT)
Dept: PRIMARY CARE CLINIC | Age: 59
End: 2019-03-26
Payer: MEDICAID

## 2019-03-26 ENCOUNTER — HOSPITAL ENCOUNTER (OUTPATIENT)
Dept: GENERAL RADIOLOGY | Age: 59
Discharge: HOME | End: 2019-03-26
Payer: MEDICAID

## 2019-03-26 VITALS
RESPIRATION RATE: 18 BRPM | DIASTOLIC BLOOD PRESSURE: 90 MMHG | SYSTOLIC BLOOD PRESSURE: 155 MMHG | HEART RATE: 98 BPM | OXYGEN SATURATION: 98 %

## 2019-03-26 DIAGNOSIS — F32.A ANXIETY AND DEPRESSION: ICD-10-CM

## 2019-03-26 DIAGNOSIS — I10 ESSENTIAL HYPERTENSION: ICD-10-CM

## 2019-03-26 DIAGNOSIS — M54.5 ACUTE MIDLINE LOW BACK PAIN, WITH SCIATICA PRESENCE UNSPECIFIED: ICD-10-CM

## 2019-03-26 DIAGNOSIS — E78.2 MIXED HYPERLIPIDEMIA: ICD-10-CM

## 2019-03-26 DIAGNOSIS — F41.9 ANXIETY AND DEPRESSION: ICD-10-CM

## 2019-03-26 DIAGNOSIS — G54.6 PHANTOM LIMB SYNDROME WITH PAIN (HCC): ICD-10-CM

## 2019-03-26 DIAGNOSIS — E11.9 DIABETES MELLITUS TYPE 2, DIET-CONTROLLED (HCC): ICD-10-CM

## 2019-03-26 PROCEDURE — G8484 FLU IMMUNIZE NO ADMIN: HCPCS | Performed by: INTERNAL MEDICINE

## 2019-03-26 PROCEDURE — 99214 OFFICE O/P EST MOD 30 MIN: CPT | Performed by: INTERNAL MEDICINE

## 2019-03-26 PROCEDURE — 2022F DILAT RTA XM EVC RTNOPTHY: CPT | Performed by: INTERNAL MEDICINE

## 2019-03-26 PROCEDURE — G8420 CALC BMI NORM PARAMETERS: HCPCS | Performed by: INTERNAL MEDICINE

## 2019-03-26 PROCEDURE — 72114 X-RAY EXAM L-S SPINE BENDING: CPT

## 2019-03-26 PROCEDURE — 3044F HG A1C LEVEL LT 7.0%: CPT | Performed by: INTERNAL MEDICINE

## 2019-03-26 PROCEDURE — 4004F PT TOBACCO SCREEN RCVD TLK: CPT | Performed by: INTERNAL MEDICINE

## 2019-03-26 PROCEDURE — 3017F COLORECTAL CA SCREEN DOC REV: CPT | Performed by: INTERNAL MEDICINE

## 2019-03-26 PROCEDURE — G8427 DOCREV CUR MEDS BY ELIG CLIN: HCPCS | Performed by: INTERNAL MEDICINE

## 2019-03-26 RX ORDER — LIDOCAINE 50 MG/G
1 OINTMENT TOPICAL 3 TIMES DAILY
Qty: 2 G | Refills: 3 | Status: SHIPPED | OUTPATIENT
Start: 2019-03-26 | End: 2019-08-09

## 2019-03-26 RX ORDER — PREDNISONE 20 MG/1
40 TABLET ORAL DAILY
Qty: 12 TABLET | Refills: 0 | Status: SHIPPED | OUTPATIENT
Start: 2019-03-26 | End: 2019-04-01

## 2019-03-26 RX ORDER — LIDOCAINE 50 MG/G
OINTMENT TOPICAL
Qty: 2 G | Refills: 3 | Status: SHIPPED | OUTPATIENT
Start: 2019-03-26 | End: 2019-03-26 | Stop reason: SDUPTHER

## 2019-03-26 RX ORDER — LOSARTAN POTASSIUM AND HYDROCHLOROTHIAZIDE 25; 100 MG/1; MG/1
1 TABLET ORAL DAILY
Qty: 90 TABLET | Refills: 3 | Status: SHIPPED | OUTPATIENT
Start: 2019-03-26 | End: 2019-08-09 | Stop reason: SDUPTHER

## 2019-03-26 RX ORDER — ROSUVASTATIN CALCIUM 5 MG/1
5 TABLET, COATED ORAL NIGHTLY
Qty: 30 TABLET | Refills: 5 | Status: SHIPPED | OUTPATIENT
Start: 2019-03-26 | End: 2019-08-09 | Stop reason: SDUPTHER

## 2019-03-26 RX ORDER — LABETALOL 300 MG/1
300 TABLET, FILM COATED ORAL 2 TIMES DAILY
Qty: 60 TABLET | Refills: 5 | Status: SHIPPED | OUTPATIENT
Start: 2019-03-26 | End: 2019-08-09 | Stop reason: SDUPTHER

## 2019-03-26 RX ORDER — PREGABALIN 100 MG/1
100 CAPSULE ORAL 3 TIMES DAILY
Qty: 90 CAPSULE | Refills: 3 | Status: SHIPPED | OUTPATIENT
Start: 2019-03-26 | End: 2019-08-09 | Stop reason: SDUPTHER

## 2019-03-26 RX ORDER — DULOXETIN HYDROCHLORIDE 60 MG/1
60 CAPSULE, DELAYED RELEASE ORAL DAILY
Qty: 90 CAPSULE | Refills: 5 | Status: SHIPPED | OUTPATIENT
Start: 2019-03-26 | End: 2019-08-09 | Stop reason: SDUPTHER

## 2019-03-26 ASSESSMENT — ENCOUNTER SYMPTOMS
BACK PAIN: 1
CONSTIPATION: 0
WHEEZING: 0
GASTROINTESTINAL NEGATIVE: 1
BLOOD IN STOOL: 0
DIARRHEA: 0
EYES NEGATIVE: 1
CHEST TIGHTNESS: 0

## 2019-03-26 NOTE — TELEPHONE ENCOUNTER
Pharmacy call requesting frequency on the patients medication lidocaine 5% cream please advise [phone]

## 2019-03-29 ENCOUNTER — TELEPHONE (OUTPATIENT)
Dept: PAIN MANAGEMENT | Age: 59
End: 2019-03-29

## 2019-03-29 DIAGNOSIS — M47.817 LUMBOSACRAL SPONDYLOSIS WITHOUT MYELOPATHY: Primary | ICD-10-CM

## 2019-04-15 ENCOUNTER — TELEPHONE (OUTPATIENT)
Dept: PRIMARY CARE CLINIC | Age: 59
End: 2019-04-15

## 2019-04-21 DIAGNOSIS — I10 ESSENTIAL HYPERTENSION: ICD-10-CM

## 2019-04-22 RX ORDER — HYDROCHLOROTHIAZIDE 25 MG/1
TABLET ORAL
Qty: 30 TABLET | Refills: 4 | Status: SHIPPED | OUTPATIENT
Start: 2019-04-22 | End: 2019-08-09

## 2019-04-27 DIAGNOSIS — G54.6 PHANTOM LIMB PAIN (HCC): ICD-10-CM

## 2019-04-29 RX ORDER — GABAPENTIN 800 MG/1
800 TABLET ORAL 3 TIMES DAILY
Qty: 90 TABLET | Refills: 3 | Status: SHIPPED | OUTPATIENT
Start: 2019-04-29 | End: 2019-08-09

## 2019-05-21 DIAGNOSIS — E11.9 DIET-CONTROLLED DIABETES MELLITUS (HCC): ICD-10-CM

## 2019-05-21 RX ORDER — ASPIRIN 325 MG
TABLET ORAL
Qty: 30 TABLET | Refills: 2 | Status: SHIPPED | OUTPATIENT
Start: 2019-05-21 | End: 2019-08-16 | Stop reason: SDUPTHER

## 2019-06-26 DIAGNOSIS — F32.A ANXIETY AND DEPRESSION: ICD-10-CM

## 2019-06-26 DIAGNOSIS — F41.9 ANXIETY AND DEPRESSION: ICD-10-CM

## 2019-06-26 RX ORDER — ESCITALOPRAM OXALATE 10 MG/1
TABLET ORAL
Qty: 30 TABLET | Refills: 5 | Status: SHIPPED | OUTPATIENT
Start: 2019-06-26 | End: 2019-08-09

## 2019-07-02 DIAGNOSIS — K21.9 GASTROESOPHAGEAL REFLUX DISEASE WITHOUT ESOPHAGITIS: ICD-10-CM

## 2019-07-02 RX ORDER — OMEPRAZOLE 20 MG/1
CAPSULE, DELAYED RELEASE ORAL
Qty: 30 CAPSULE | Refills: 3 | Status: SHIPPED | OUTPATIENT
Start: 2019-07-02 | End: 2019-10-31 | Stop reason: SDUPTHER

## 2019-08-09 ENCOUNTER — OFFICE VISIT (OUTPATIENT)
Dept: PRIMARY CARE CLINIC | Age: 59
End: 2019-08-09
Payer: MEDICAID

## 2019-08-09 VITALS — SYSTOLIC BLOOD PRESSURE: 180 MMHG | HEART RATE: 95 BPM | DIASTOLIC BLOOD PRESSURE: 100 MMHG

## 2019-08-09 DIAGNOSIS — F41.9 ANXIETY AND DEPRESSION: ICD-10-CM

## 2019-08-09 DIAGNOSIS — E11.9 DIABETES MELLITUS TYPE 2, DIET-CONTROLLED (HCC): ICD-10-CM

## 2019-08-09 DIAGNOSIS — I10 ESSENTIAL HYPERTENSION: ICD-10-CM

## 2019-08-09 DIAGNOSIS — E11.9 DIABETES MELLITUS TYPE 2, DIET-CONTROLLED (HCC): Primary | ICD-10-CM

## 2019-08-09 DIAGNOSIS — F32.A ANXIETY AND DEPRESSION: ICD-10-CM

## 2019-08-09 DIAGNOSIS — G54.6 PHANTOM LIMB SYNDROME WITH PAIN (HCC): ICD-10-CM

## 2019-08-09 DIAGNOSIS — F17.200 SMOKER: ICD-10-CM

## 2019-08-09 LAB
A/G RATIO: 1.7 (ref 1.1–2.2)
ALBUMIN SERPL-MCNC: 4.5 G/DL (ref 3.4–5)
ALP BLD-CCNC: 91 U/L (ref 40–129)
ALT SERPL-CCNC: 21 U/L (ref 10–40)
ANION GAP SERPL CALCULATED.3IONS-SCNC: 17 MMOL/L (ref 3–16)
AST SERPL-CCNC: 17 U/L (ref 15–37)
BILIRUB SERPL-MCNC: 0.3 MG/DL (ref 0–1)
BUN BLDV-MCNC: 5 MG/DL (ref 7–20)
CALCIUM SERPL-MCNC: 9.3 MG/DL (ref 8.3–10.6)
CHLORIDE BLD-SCNC: 99 MMOL/L (ref 99–110)
CHOLESTEROL, TOTAL: 313 MG/DL (ref 0–199)
CO2: 21 MMOL/L (ref 21–32)
CREAT SERPL-MCNC: 0.8 MG/DL (ref 0.8–1.3)
GFR AFRICAN AMERICAN: >60
GFR NON-AFRICAN AMERICAN: >60
GLOBULIN: 2.7 G/DL
GLUCOSE BLD-MCNC: 79 MG/DL (ref 70–99)
HBA1C MFR BLD: 6.4 %
HDLC SERPL-MCNC: 23 MG/DL (ref 40–60)
LDL CHOLESTEROL CALCULATED: ABNORMAL MG/DL
POTASSIUM SERPL-SCNC: 4 MMOL/L (ref 3.5–5.1)
SODIUM BLD-SCNC: 137 MMOL/L (ref 136–145)
TOTAL PROTEIN: 7.2 G/DL (ref 6.4–8.2)
TRIGL SERPL-MCNC: 1225 MG/DL (ref 0–150)
VLDLC SERPL CALC-MCNC: ABNORMAL MG/DL

## 2019-08-09 PROCEDURE — G8427 DOCREV CUR MEDS BY ELIG CLIN: HCPCS | Performed by: INTERNAL MEDICINE

## 2019-08-09 PROCEDURE — 3017F COLORECTAL CA SCREEN DOC REV: CPT | Performed by: INTERNAL MEDICINE

## 2019-08-09 PROCEDURE — 4004F PT TOBACCO SCREEN RCVD TLK: CPT | Performed by: INTERNAL MEDICINE

## 2019-08-09 PROCEDURE — 3044F HG A1C LEVEL LT 7.0%: CPT | Performed by: INTERNAL MEDICINE

## 2019-08-09 PROCEDURE — 83036 HEMOGLOBIN GLYCOSYLATED A1C: CPT | Performed by: INTERNAL MEDICINE

## 2019-08-09 PROCEDURE — 99214 OFFICE O/P EST MOD 30 MIN: CPT | Performed by: INTERNAL MEDICINE

## 2019-08-09 PROCEDURE — 2022F DILAT RTA XM EVC RTNOPTHY: CPT | Performed by: INTERNAL MEDICINE

## 2019-08-09 PROCEDURE — G8420 CALC BMI NORM PARAMETERS: HCPCS | Performed by: INTERNAL MEDICINE

## 2019-08-09 RX ORDER — ROSUVASTATIN CALCIUM 5 MG/1
5 TABLET, COATED ORAL NIGHTLY
Qty: 90 TABLET | Refills: 5 | Status: SHIPPED | OUTPATIENT
Start: 2019-08-09 | End: 2019-12-19 | Stop reason: SDUPTHER

## 2019-08-09 RX ORDER — LABETALOL 300 MG/1
300 TABLET, FILM COATED ORAL 2 TIMES DAILY
Qty: 60 TABLET | Refills: 5 | Status: SHIPPED | OUTPATIENT
Start: 2019-08-09 | End: 2019-12-19 | Stop reason: SDUPTHER

## 2019-08-09 RX ORDER — GABAPENTIN 800 MG/1
800 TABLET ORAL 3 TIMES DAILY
Qty: 90 TABLET | Refills: 5 | Status: SHIPPED | OUTPATIENT
Start: 2019-08-09 | End: 2019-12-19 | Stop reason: SDUPTHER

## 2019-08-09 RX ORDER — CLONIDINE HYDROCHLORIDE 0.3 MG/1
0.3 TABLET ORAL 3 TIMES DAILY PRN
Qty: 90 TABLET | Refills: 3 | Status: SHIPPED | OUTPATIENT
Start: 2019-08-09 | End: 2019-08-09

## 2019-08-09 RX ORDER — LOSARTAN POTASSIUM AND HYDROCHLOROTHIAZIDE 25; 100 MG/1; MG/1
1 TABLET ORAL DAILY
Qty: 90 TABLET | Refills: 3 | Status: SHIPPED | OUTPATIENT
Start: 2019-08-09 | End: 2019-12-19 | Stop reason: SDUPTHER

## 2019-08-09 RX ORDER — PREGABALIN 100 MG/1
100 CAPSULE ORAL 3 TIMES DAILY
Qty: 90 CAPSULE | Refills: 3 | Status: SHIPPED | OUTPATIENT
Start: 2019-08-09 | End: 2019-08-09

## 2019-08-09 RX ORDER — CLONIDINE HYDROCHLORIDE 0.3 MG/1
0.3 TABLET ORAL 3 TIMES DAILY
Qty: 90 TABLET | Refills: 3 | Status: SHIPPED | OUTPATIENT
Start: 2019-08-09 | End: 2019-12-19 | Stop reason: SDUPTHER

## 2019-08-09 RX ORDER — DULOXETIN HYDROCHLORIDE 60 MG/1
60 CAPSULE, DELAYED RELEASE ORAL DAILY
Qty: 90 CAPSULE | Refills: 5 | Status: SHIPPED | OUTPATIENT
Start: 2019-08-09 | End: 2019-12-19 | Stop reason: SDUPTHER

## 2019-08-09 RX ORDER — CLONIDINE HYDROCHLORIDE 0.3 MG/1
0.3 TABLET ORAL 3 TIMES DAILY PRN
Qty: 90 TABLET | Refills: 3 | Status: SHIPPED | OUTPATIENT
Start: 2019-08-09 | End: 2019-08-09 | Stop reason: SDUPTHER

## 2019-08-09 ASSESSMENT — ENCOUNTER SYMPTOMS
CHEST TIGHTNESS: 0
BLOOD IN STOOL: 0
GASTROINTESTINAL NEGATIVE: 1
EYES NEGATIVE: 1
CONSTIPATION: 0
BACK PAIN: 1
WHEEZING: 0
DIARRHEA: 0

## 2019-08-09 NOTE — PROGRESS NOTES
on meds   No known CAD ? Gastrointestinal: Negative. Negative for blood in stool, constipation and diarrhea. Colonoscopy 2010 ; [facility]    Endocrine:        Diabetes 2012 ; was on  Insulin   Off ALL meds > 1 yr   Genitourinary: Negative for dysuria, frequency and urgency. Musculoskeletal: Positive for arthralgias and back pain. S/P Bilat leg amputations for PVD   Skin: Negative for rash. Neurological: Negative for dizziness, tremors, weakness, light-headedness and headaches. Psychiatric/Behavioral: Negative for behavioral problems and sleep disturbance. The patient is not nervous/anxious. Objective:   Physical Exam   Constitutional: He is oriented to person, place, and time. Eyes: Conjunctivae are normal.   Cardiovascular: Regular rhythm and normal heart sounds. Pulmonary/Chest: He has no wheezes. Reduced BS   Abdominal: He exhibits distension. There is no tenderness. Musculoskeletal: He exhibits no tenderness or deformity. Bilat leg amputee; in a wheel chair    Neurological: He is alert and oriented to person, place, and time. He has normal strength. Skin: Skin is warm. Psychiatric: His affect is angry. He is agitated. Vitals reviewed. Assessment:         Vijaya Cota was seen today for annual exam, diabetes and hypertension. Diagnoses and all orders for this visit:   Must Bring in ALL meds at F/U if he cannot remember what he is on ! Diabetes mellitus type 2, diet-controlled (HCC) A1C 6.4   Advised to have Eye exam   -     POCT glycosylated hemoglobin (Hb A1C)  -     rosuvastatin (CRESTOR) 5 MG tablet; Take 1 tablet by mouth nightly  -     Comprehensive Metabolic Panel; Future  -     Lipid Panel; Future  -     losartan-hydrochlorothiazide (HYZAAR) 100-25 MG per tablet; Take 1 tablet by mouth daily    Essential hypertension    bP up . Must take all 3 meds! + low salt diet   -     losartan-hydrochlorothiazide (HYZAAR) 100-25 MG per tablet;  Take 1 tablet by mouth

## 2019-08-10 LAB
AMPHETAMINE SCREEN, URINE: ABNORMAL
BARBITURATE SCREEN URINE: ABNORMAL
BENZODIAZEPINE SCREEN, URINE: ABNORMAL
CANNABINOID SCREEN URINE: POSITIVE
COCAINE METABOLITE SCREEN URINE: ABNORMAL
LDL CHOLESTEROL DIRECT: 132 MG/DL
Lab: ABNORMAL
METHADONE SCREEN, URINE: ABNORMAL
OPIATE SCREEN URINE: ABNORMAL
OXYCODONE URINE: ABNORMAL
PH UA: 5
PHENCYCLIDINE SCREEN URINE: ABNORMAL
PROPOXYPHENE SCREEN: ABNORMAL

## 2019-08-16 DIAGNOSIS — E11.9 DIET-CONTROLLED DIABETES MELLITUS (HCC): ICD-10-CM

## 2019-08-16 RX ORDER — ASPIRIN 325 MG
TABLET ORAL
Qty: 30 TABLET | Refills: 1 | Status: SHIPPED | OUTPATIENT
Start: 2019-08-16 | End: 2019-10-16 | Stop reason: SDUPTHER

## 2019-10-16 DIAGNOSIS — E11.9 DIET-CONTROLLED DIABETES MELLITUS (HCC): ICD-10-CM

## 2019-10-16 RX ORDER — ASPIRIN 325 MG
TABLET ORAL
Qty: 30 TABLET | Refills: 0 | Status: SHIPPED | OUTPATIENT
Start: 2019-10-16 | End: 2019-11-11 | Stop reason: SDUPTHER

## 2019-10-31 DIAGNOSIS — K21.9 GASTROESOPHAGEAL REFLUX DISEASE WITHOUT ESOPHAGITIS: ICD-10-CM

## 2019-10-31 RX ORDER — OMEPRAZOLE 20 MG/1
CAPSULE, DELAYED RELEASE ORAL
Qty: 30 CAPSULE | Refills: 2 | Status: SHIPPED | OUTPATIENT
Start: 2019-10-31 | End: 2019-12-19 | Stop reason: SDUPTHER

## 2019-11-11 DIAGNOSIS — E11.9 DIET-CONTROLLED DIABETES MELLITUS (HCC): ICD-10-CM

## 2019-11-11 RX ORDER — ASPIRIN 325 MG
TABLET ORAL
Qty: 30 TABLET | Refills: 0 | Status: SHIPPED | OUTPATIENT
Start: 2019-11-11 | End: 2019-12-09 | Stop reason: SDUPTHER

## 2019-12-09 DIAGNOSIS — E11.9 DIET-CONTROLLED DIABETES MELLITUS (HCC): ICD-10-CM

## 2019-12-09 RX ORDER — ASPIRIN 325 MG
TABLET ORAL
Qty: 30 TABLET | Refills: 0 | Status: SHIPPED | OUTPATIENT
Start: 2019-12-09 | End: 2019-12-19 | Stop reason: SDUPTHER

## 2019-12-19 ENCOUNTER — OFFICE VISIT (OUTPATIENT)
Dept: PRIMARY CARE CLINIC | Age: 59
End: 2019-12-19
Payer: MEDICAID

## 2019-12-19 VITALS — SYSTOLIC BLOOD PRESSURE: 180 MMHG | HEART RATE: 111 BPM | DIASTOLIC BLOOD PRESSURE: 110 MMHG

## 2019-12-19 DIAGNOSIS — K21.9 GASTROESOPHAGEAL REFLUX DISEASE WITHOUT ESOPHAGITIS: ICD-10-CM

## 2019-12-19 DIAGNOSIS — E11.59 TYPE 2 DIABETES MELLITUS WITH OTHER CIRCULATORY COMPLICATION, WITHOUT LONG-TERM CURRENT USE OF INSULIN (HCC): ICD-10-CM

## 2019-12-19 DIAGNOSIS — J44.9 CHRONIC OBSTRUCTIVE PULMONARY DISEASE, UNSPECIFIED COPD TYPE (HCC): ICD-10-CM

## 2019-12-19 DIAGNOSIS — Z12.11 SCREEN FOR COLON CANCER: ICD-10-CM

## 2019-12-19 DIAGNOSIS — F41.9 ANXIETY AND DEPRESSION: ICD-10-CM

## 2019-12-19 DIAGNOSIS — F41.9 ANXIETY: ICD-10-CM

## 2019-12-19 DIAGNOSIS — I10 ESSENTIAL HYPERTENSION: ICD-10-CM

## 2019-12-19 DIAGNOSIS — F17.200 SMOKER: ICD-10-CM

## 2019-12-19 DIAGNOSIS — G54.6 PHANTOM LIMB SYNDROME WITH PAIN (HCC): ICD-10-CM

## 2019-12-19 DIAGNOSIS — Z71.85 VACCINE COUNSELING: ICD-10-CM

## 2019-12-19 DIAGNOSIS — E11.9 DIABETES MELLITUS TYPE 2, DIET-CONTROLLED (HCC): ICD-10-CM

## 2019-12-19 DIAGNOSIS — F32.A ANXIETY AND DEPRESSION: ICD-10-CM

## 2019-12-19 LAB
AMPHETAMINE SCREEN, URINE: ABNORMAL
BARBITURATE SCREEN URINE: ABNORMAL
BENZODIAZEPINE SCREEN, URINE: ABNORMAL
CANNABINOID SCREEN URINE: POSITIVE
COCAINE METABOLITE SCREEN URINE: ABNORMAL
Lab: ABNORMAL
METHADONE SCREEN, URINE: ABNORMAL
OPIATE SCREEN URINE: ABNORMAL
OXYCODONE URINE: ABNORMAL
PH UA: 5
PHENCYCLIDINE SCREEN URINE: ABNORMAL
PROPOXYPHENE SCREEN: ABNORMAL

## 2019-12-19 PROCEDURE — G8484 FLU IMMUNIZE NO ADMIN: HCPCS | Performed by: INTERNAL MEDICINE

## 2019-12-19 PROCEDURE — 3023F SPIROM DOC REV: CPT | Performed by: INTERNAL MEDICINE

## 2019-12-19 PROCEDURE — 2022F DILAT RTA XM EVC RTNOPTHY: CPT | Performed by: INTERNAL MEDICINE

## 2019-12-19 PROCEDURE — G8420 CALC BMI NORM PARAMETERS: HCPCS | Performed by: INTERNAL MEDICINE

## 2019-12-19 PROCEDURE — 3044F HG A1C LEVEL LT 7.0%: CPT | Performed by: INTERNAL MEDICINE

## 2019-12-19 PROCEDURE — G8926 SPIRO NO PERF OR DOC: HCPCS | Performed by: INTERNAL MEDICINE

## 2019-12-19 PROCEDURE — G8427 DOCREV CUR MEDS BY ELIG CLIN: HCPCS | Performed by: INTERNAL MEDICINE

## 2019-12-19 PROCEDURE — 4004F PT TOBACCO SCREEN RCVD TLK: CPT | Performed by: INTERNAL MEDICINE

## 2019-12-19 PROCEDURE — 99214 OFFICE O/P EST MOD 30 MIN: CPT | Performed by: INTERNAL MEDICINE

## 2019-12-19 PROCEDURE — 3017F COLORECTAL CA SCREEN DOC REV: CPT | Performed by: INTERNAL MEDICINE

## 2019-12-19 RX ORDER — GABAPENTIN 800 MG/1
800 TABLET ORAL 3 TIMES DAILY
Qty: 90 TABLET | Refills: 5 | Status: SHIPPED | OUTPATIENT
Start: 2019-12-19 | End: 2020-02-13 | Stop reason: SDUPTHER

## 2019-12-19 RX ORDER — CLONIDINE HYDROCHLORIDE 0.3 MG/1
0.3 TABLET ORAL 3 TIMES DAILY
Qty: 180 TABLET | Refills: 5 | Status: SHIPPED | OUTPATIENT
Start: 2019-12-19 | End: 2020-02-13 | Stop reason: SDUPTHER

## 2019-12-19 RX ORDER — DIAZEPAM 5 MG/1
5 TABLET ORAL EVERY 12 HOURS PRN
Qty: 20 TABLET | Refills: 0 | Status: SHIPPED | OUTPATIENT
Start: 2019-12-19 | End: 2019-12-29

## 2019-12-19 RX ORDER — OMEPRAZOLE 20 MG/1
20 CAPSULE, DELAYED RELEASE ORAL DAILY
Qty: 30 CAPSULE | Refills: 2 | Status: SHIPPED | OUTPATIENT
Start: 2019-12-19 | End: 2020-02-13

## 2019-12-19 RX ORDER — ROSUVASTATIN CALCIUM 5 MG/1
5 TABLET, COATED ORAL NIGHTLY
Qty: 90 TABLET | Refills: 5 | Status: SHIPPED | OUTPATIENT
Start: 2019-12-19 | End: 2020-02-13 | Stop reason: SDUPTHER

## 2019-12-19 RX ORDER — ALBUTEROL SULFATE 90 UG/1
2 AEROSOL, METERED RESPIRATORY (INHALATION) EVERY 4 HOURS PRN
Qty: 1 INHALER | Refills: 5 | Status: SHIPPED | OUTPATIENT
Start: 2019-12-19 | End: 2020-02-13 | Stop reason: ALTCHOICE

## 2019-12-19 RX ORDER — DULOXETIN HYDROCHLORIDE 60 MG/1
60 CAPSULE, DELAYED RELEASE ORAL DAILY
Qty: 90 CAPSULE | Refills: 5 | Status: SHIPPED | OUTPATIENT
Start: 2019-12-19 | End: 2020-02-13 | Stop reason: SDUPTHER

## 2019-12-19 RX ORDER — LABETALOL 300 MG/1
300 TABLET, FILM COATED ORAL 2 TIMES DAILY
Qty: 60 TABLET | Refills: 12 | Status: SHIPPED | OUTPATIENT
Start: 2019-12-19 | End: 2020-02-13

## 2019-12-19 RX ORDER — LOSARTAN POTASSIUM AND HYDROCHLOROTHIAZIDE 25; 100 MG/1; MG/1
1 TABLET ORAL DAILY
Qty: 90 TABLET | Refills: 5 | Status: SHIPPED | OUTPATIENT
Start: 2019-12-19 | End: 2020-02-13 | Stop reason: SDUPTHER

## 2019-12-19 RX ORDER — ASPIRIN 325 MG
325 TABLET ORAL DAILY
Qty: 90 TABLET | Refills: 5 | Status: SHIPPED | OUTPATIENT
Start: 2019-12-19 | End: 2020-02-13 | Stop reason: SDUPTHER

## 2019-12-19 ASSESSMENT — ENCOUNTER SYMPTOMS
EYES NEGATIVE: 1
BLOOD IN STOOL: 0
WHEEZING: 0
CONSTIPATION: 0
BACK PAIN: 1
DIARRHEA: 0
CHEST TIGHTNESS: 0
GASTROINTESTINAL NEGATIVE: 1

## 2019-12-23 RX ORDER — ESCITALOPRAM OXALATE 10 MG/1
TABLET ORAL
Qty: 30 TABLET | Refills: 4 | Status: SHIPPED | OUTPATIENT
Start: 2019-12-23

## 2020-01-20 ENCOUNTER — TELEPHONE (OUTPATIENT)
Dept: PRIMARY CARE CLINIC | Age: 60
End: 2020-01-20

## 2020-01-21 NOTE — TELEPHONE ENCOUNTER
Called and spoke to pt and he states that he is not planning on going into a NH.  I also advised him that he would need to add June to HIPAA in order for us to be able to speak with her

## 2020-02-04 ENCOUNTER — TELEPHONE (OUTPATIENT)
Dept: PRIMARY CARE CLINIC | Age: 60
End: 2020-02-04

## 2020-02-11 ENCOUNTER — TELEPHONE (OUTPATIENT)
Dept: PRIMARY CARE CLINIC | Age: 60
End: 2020-02-11

## 2020-02-13 ENCOUNTER — OFFICE VISIT (OUTPATIENT)
Dept: PRIMARY CARE CLINIC | Age: 60
End: 2020-02-13
Payer: MEDICAID

## 2020-02-13 VITALS — HEART RATE: 121 BPM | SYSTOLIC BLOOD PRESSURE: 200 MMHG | DIASTOLIC BLOOD PRESSURE: 120 MMHG

## 2020-02-13 LAB — HBA1C MFR BLD: 6.3 %

## 2020-02-13 PROCEDURE — 99214 OFFICE O/P EST MOD 30 MIN: CPT | Performed by: INTERNAL MEDICINE

## 2020-02-13 PROCEDURE — 2022F DILAT RTA XM EVC RTNOPTHY: CPT | Performed by: INTERNAL MEDICINE

## 2020-02-13 PROCEDURE — G8421 BMI NOT CALCULATED: HCPCS | Performed by: INTERNAL MEDICINE

## 2020-02-13 PROCEDURE — G8484 FLU IMMUNIZE NO ADMIN: HCPCS | Performed by: INTERNAL MEDICINE

## 2020-02-13 PROCEDURE — 3017F COLORECTAL CA SCREEN DOC REV: CPT | Performed by: INTERNAL MEDICINE

## 2020-02-13 PROCEDURE — 83036 HEMOGLOBIN GLYCOSYLATED A1C: CPT | Performed by: INTERNAL MEDICINE

## 2020-02-13 PROCEDURE — 4004F PT TOBACCO SCREEN RCVD TLK: CPT | Performed by: INTERNAL MEDICINE

## 2020-02-13 PROCEDURE — G8427 DOCREV CUR MEDS BY ELIG CLIN: HCPCS | Performed by: INTERNAL MEDICINE

## 2020-02-13 PROCEDURE — 3044F HG A1C LEVEL LT 7.0%: CPT | Performed by: INTERNAL MEDICINE

## 2020-02-13 RX ORDER — CLONIDINE HYDROCHLORIDE 0.3 MG/1
0.3 TABLET ORAL 3 TIMES DAILY
Qty: 180 TABLET | Refills: 5 | Status: SHIPPED | OUTPATIENT
Start: 2020-02-13

## 2020-02-13 RX ORDER — LABETALOL 300 MG/1
300 TABLET, FILM COATED ORAL 2 TIMES DAILY
Qty: 180 TABLET | Refills: 5 | Status: SHIPPED | OUTPATIENT
Start: 2020-02-13

## 2020-02-13 RX ORDER — LOSARTAN POTASSIUM AND HYDROCHLOROTHIAZIDE 25; 100 MG/1; MG/1
1 TABLET ORAL DAILY
Qty: 90 TABLET | Refills: 5 | Status: SHIPPED | OUTPATIENT
Start: 2020-02-13 | End: 2020-09-28

## 2020-02-13 RX ORDER — ROSUVASTATIN CALCIUM 5 MG/1
5 TABLET, COATED ORAL NIGHTLY
Qty: 90 TABLET | Refills: 5 | Status: SHIPPED | OUTPATIENT
Start: 2020-02-13

## 2020-02-13 RX ORDER — OMEPRAZOLE 20 MG/1
20 CAPSULE, DELAYED RELEASE ORAL DAILY
Qty: 90 CAPSULE | Refills: 5 | Status: SHIPPED | OUTPATIENT
Start: 2020-02-13

## 2020-02-13 RX ORDER — ASPIRIN 325 MG
325 TABLET ORAL DAILY
Qty: 90 TABLET | Refills: 5 | Status: SHIPPED | OUTPATIENT
Start: 2020-02-13

## 2020-02-13 RX ORDER — DULOXETIN HYDROCHLORIDE 60 MG/1
60 CAPSULE, DELAYED RELEASE ORAL DAILY
Qty: 90 CAPSULE | Refills: 5 | Status: SHIPPED | OUTPATIENT
Start: 2020-02-13

## 2020-02-13 RX ORDER — GABAPENTIN 800 MG/1
800 TABLET ORAL 3 TIMES DAILY
Qty: 90 TABLET | Refills: 5 | Status: SHIPPED | OUTPATIENT
Start: 2020-02-13 | End: 2020-09-28

## 2020-02-13 ASSESSMENT — ENCOUNTER SYMPTOMS
CONSTIPATION: 0
GASTROINTESTINAL NEGATIVE: 1
BLOOD IN STOOL: 0
BACK PAIN: 1
CHEST TIGHTNESS: 0
WHEEZING: 0
DIARRHEA: 0
EYES NEGATIVE: 1

## 2020-02-13 ASSESSMENT — PATIENT HEALTH QUESTIONNAIRE - PHQ9
1. LITTLE INTEREST OR PLEASURE IN DOING THINGS: 1
SUM OF ALL RESPONSES TO PHQ QUESTIONS 1-9: 2
SUM OF ALL RESPONSES TO PHQ9 QUESTIONS 1 & 2: 2
SUM OF ALL RESPONSES TO PHQ QUESTIONS 1-9: 2
2. FEELING DOWN, DEPRESSED OR HOPELESS: 1

## 2020-02-13 NOTE — PROGRESS NOTES
place, and time. Eyes: Conjunctivae are normal.   Cardiovascular: Regular rhythm and normal heart sounds. Pulmonary/Chest: He has no wheezes. Reduced BS   Abdominal: He exhibits distension. There is no abdominal tenderness. Musculoskeletal:         General: No tenderness or deformity. Comments: Bilat leg amputee; in a wheel chair    Neurological: He is alert and oriented to person, place, and time. He has normal strength. Skin: Skin is warm. Psychiatric: His affect is angry. He is aggressive. Vitals reviewed. Assessment:         Jonathan Snow was seen today for diabetes and hypertension. Diagnoses and all orders for this visit:    Essential hypertension  BP very high . Advised to go to ER but that got him more aggravated . Niece will go home and give him all his meds   -     labetalol (NORMODYNE) 300 MG tablet; Take 1 tablet by mouth 2 times daily  -     losartan-hydrochlorothiazide (HYZAAR) 100-25 MG per tablet; Take 1 tablet by mouth daily  -     cloNIDine (CATAPRES) 0.3 MG tablet; Take 1 tablet by mouth 3 times daily    Diabetes mellitus type 2, diet-controlled (HCC)  -     POCT glycosylated hemoglobin (Hb A1C)  -     losartan-hydrochlorothiazide (HYZAAR) 100-25 MG per tablet; Take 1 tablet by mouth daily  -     aspirin 325 MG tablet; Take 1 tablet by mouth daily  -     rosuvastatin (CRESTOR) 5 MG tablet; Take 1 tablet by mouth nightly    Gastroesophageal reflux disease without esophagitis  -     omeprazole (PRILOSEC) 20 MG delayed release capsule; Take 1 capsule by mouth Daily    Phantom limb syndrome with pain (HCC)  -     DULoxetine (CYMBALTA) 60 MG extended release capsule; Take 1 capsule by mouth daily  -     gabapentin (NEURONTIN) 800 MG tablet; Take 1 tablet by mouth 3 times daily for 30 days. Anxiety and depression  -     DULoxetine (CYMBALTA) 60 MG extended release capsule; Take 1 capsule by mouth daily          Vaccine counseling refusing all    .       Plan:          Self Management Goals    Know which medication is for what condition:   Know side effects of medications, and discuss with doctor   Discuss side effects and instructions on new medications. Barriers to medication compliance addressed. All patient questions answered. Pt voiced understanding. Know correct dose/frequency of medications  Take medications at the same time each day  Stay current on medication refills  If taking OTC's check with MD/pharmacy first about interactions  Monitor sugars and record time/meal  A1C goal 7.0 or less  LDL goal 640 or less  Systolic BP < or equal to 107  Diastolic BP < or equal to 85  Quota system, X  number of cigarettes per day  Nicoderm and or Chantix  Advised Flu and Pneumonia Vax      Exercise 3-5 times per week  Keep check of weight  Weighting machine    On a scale of 1 to 5 how confident are you in these goals?   3/5    Education materials givenDiabetic Education Referral   [phone]

## 2020-02-14 ENCOUNTER — TELEPHONE (OUTPATIENT)
Dept: PRIMARY CARE CLINIC | Age: 60
End: 2020-02-14

## 2020-02-29 ENCOUNTER — APPOINTMENT (OUTPATIENT)
Dept: GENERAL RADIOLOGY | Age: 60
End: 2020-02-29
Payer: MEDICAID

## 2020-02-29 ENCOUNTER — APPOINTMENT (OUTPATIENT)
Dept: CT IMAGING | Age: 60
End: 2020-02-29
Payer: MEDICAID

## 2020-02-29 ENCOUNTER — HOSPITAL ENCOUNTER (EMERGENCY)
Age: 60
Discharge: HOME | End: 2020-03-01
Attending: EMERGENCY MEDICINE
Payer: MEDICAID

## 2020-02-29 LAB
A/G RATIO: 1.1 (ref 1.1–2.2)
ALBUMIN SERPL-MCNC: 3.9 G/DL (ref 3.4–5)
ALP BLD-CCNC: 93 U/L (ref 40–129)
ALT SERPL-CCNC: 23 U/L (ref 10–40)
AMPHETAMINE SCREEN, URINE: ABNORMAL
ANION GAP SERPL CALCULATED.3IONS-SCNC: 21 MMOL/L (ref 3–16)
AST SERPL-CCNC: 26 U/L (ref 15–37)
BARBITURATE SCREEN URINE: ABNORMAL
BASE EXCESS VENOUS: -2.3 MMOL/L
BENZODIAZEPINE SCREEN, URINE: POSITIVE
BILIRUB SERPL-MCNC: 0.3 MG/DL (ref 0–1)
BUN BLDV-MCNC: 9 MG/DL (ref 7–20)
CALCIUM SERPL-MCNC: 8.9 MG/DL (ref 8.3–10.6)
CANNABINOID SCREEN URINE: POSITIVE
CARBOXYHEMOGLOBIN: 3.4 %
CHLORIDE BLD-SCNC: 91 MMOL/L (ref 99–110)
CO2: 20 MMOL/L (ref 21–32)
COCAINE METABOLITE SCREEN URINE: ABNORMAL
CREAT SERPL-MCNC: 0.7 MG/DL (ref 0.8–1.3)
ETHANOL: 241 MG/DL (ref 0–0.08)
GFR AFRICAN AMERICAN: >60
GFR NON-AFRICAN AMERICAN: >60
GLOBULIN: 3.6 G/DL
GLUCOSE BLD-MCNC: 139 MG/DL (ref 70–99)
HCO3 VENOUS: 26 MMOL/L (ref 23–29)
INR BLD: 1.14 (ref 0.86–1.14)
LACTIC ACID: 2.1 MMOL/L (ref 0.4–2)
LIPASE: 98 U/L (ref 13–60)
Lab: ABNORMAL
MAGNESIUM: 2.2 MG/DL (ref 1.8–2.4)
METHADONE SCREEN, URINE: ABNORMAL
METHEMOGLOBIN VENOUS: 0.5 %
O2 CONTENT, VEN: 20 ML/DL
O2 SAT, VEN: 92 %
O2 THERAPY: ABNORMAL
OPIATE SCREEN URINE: ABNORMAL
OXYCODONE URINE: ABNORMAL
PCO2, VEN: 56 MMHG (ref 40–50)
PH UA: 6
PH VENOUS: 7.27 (ref 7.35–7.45)
PHENCYCLIDINE SCREEN URINE: ABNORMAL
PO2, VEN: 79 MMHG
POTASSIUM REFLEX MAGNESIUM: 3.2 MMOL/L (ref 3.5–5.1)
PROPOXYPHENE SCREEN: ABNORMAL
PROTHROMBIN TIME: 13.2 SEC (ref 10–13.2)
SODIUM BLD-SCNC: 132 MMOL/L (ref 136–145)
TCO2 CALC VENOUS: 28 MMOL/L
TOTAL PROTEIN: 7.5 G/DL (ref 6.4–8.2)
TROPONIN: <0.01 NG/ML

## 2020-02-29 PROCEDURE — 85610 PROTHROMBIN TIME: CPT

## 2020-02-29 PROCEDURE — 71045 X-RAY EXAM CHEST 1 VIEW: CPT

## 2020-02-29 PROCEDURE — 70450 CT HEAD/BRAIN W/O DYE: CPT

## 2020-02-29 PROCEDURE — 84484 ASSAY OF TROPONIN QUANT: CPT

## 2020-02-29 PROCEDURE — 83735 ASSAY OF MAGNESIUM: CPT

## 2020-02-29 PROCEDURE — G0480 DRUG TEST DEF 1-7 CLASSES: HCPCS

## 2020-02-29 PROCEDURE — 81001 URINALYSIS AUTO W/SCOPE: CPT

## 2020-02-29 PROCEDURE — 82803 BLOOD GASES ANY COMBINATION: CPT

## 2020-02-29 PROCEDURE — 80053 COMPREHEN METABOLIC PANEL: CPT

## 2020-02-29 PROCEDURE — 83605 ASSAY OF LACTIC ACID: CPT

## 2020-02-29 PROCEDURE — 99285 EMERGENCY DEPT VISIT HI MDM: CPT

## 2020-02-29 PROCEDURE — 83690 ASSAY OF LIPASE: CPT

## 2020-02-29 PROCEDURE — 93005 ELECTROCARDIOGRAM TRACING: CPT | Performed by: EMERGENCY MEDICINE

## 2020-02-29 PROCEDURE — 80307 DRUG TEST PRSMV CHEM ANLYZR: CPT

## 2020-03-01 VITALS
DIASTOLIC BLOOD PRESSURE: 52 MMHG | HEART RATE: 86 BPM | RESPIRATION RATE: 25 BRPM | SYSTOLIC BLOOD PRESSURE: 119 MMHG | BODY MASS INDEX: 24.91 KG/M2 | TEMPERATURE: 98.7 F | WEIGHT: 194 LBS | OXYGEN SATURATION: 100 %

## 2020-03-01 LAB
BILIRUBIN URINE: NEGATIVE
BLOOD, URINE: NEGATIVE
CLARITY: CLEAR
COLOR: YELLOW
EKG ATRIAL RATE: 88 BPM
EKG DIAGNOSIS: NORMAL
EKG P AXIS: 52 DEGREES
EKG P-R INTERVAL: 216 MS
EKG Q-T INTERVAL: 404 MS
EKG QRS DURATION: 130 MS
EKG QTC CALCULATION (BAZETT): 488 MS
EKG R AXIS: -50 DEGREES
EKG T AXIS: 2 DEGREES
EKG VENTRICULAR RATE: 88 BPM
EPITHELIAL CELLS, UA: 1 /HPF (ref 0–5)
GLUCOSE URINE: NEGATIVE MG/DL
HYALINE CASTS: 1 /LPF (ref 0–8)
KETONES, URINE: NEGATIVE MG/DL
LEUKOCYTE ESTERASE, URINE: NEGATIVE
MICROSCOPIC EXAMINATION: YES
NITRITE, URINE: NEGATIVE
PH UA: 6.5 (ref 5–8)
PROTEIN UA: 30 MG/DL
RBC UA: 4 /HPF (ref 0–4)
SPECIFIC GRAVITY UA: 1.02 (ref 1–1.03)
URINE REFLEX TO CULTURE: ABNORMAL
URINE TYPE: ABNORMAL
UROBILINOGEN, URINE: 1 E.U./DL
WBC UA: 1 /HPF (ref 0–5)

## 2020-03-01 PROCEDURE — 6360000002 HC RX W HCPCS: Performed by: EMERGENCY MEDICINE

## 2020-03-01 PROCEDURE — 96375 TX/PRO/DX INJ NEW DRUG ADDON: CPT

## 2020-03-01 PROCEDURE — 96374 THER/PROPH/DIAG INJ IV PUSH: CPT

## 2020-03-01 PROCEDURE — 93010 ELECTROCARDIOGRAM REPORT: CPT | Performed by: INTERNAL MEDICINE

## 2020-03-01 RX ORDER — DIPHENHYDRAMINE HYDROCHLORIDE 50 MG/ML
50 INJECTION INTRAMUSCULAR; INTRAVENOUS ONCE
Status: COMPLETED | OUTPATIENT
Start: 2020-03-01 | End: 2020-03-01

## 2020-03-01 RX ORDER — LORAZEPAM 2 MG/ML
2 INJECTION INTRAMUSCULAR ONCE
Status: COMPLETED | OUTPATIENT
Start: 2020-03-01 | End: 2020-03-01

## 2020-03-01 RX ADMIN — LORAZEPAM 2 MG: 2 INJECTION INTRAMUSCULAR; INTRAVENOUS at 01:50

## 2020-03-01 RX ADMIN — DIPHENHYDRAMINE HYDROCHLORIDE 50 MG: 50 INJECTION, SOLUTION INTRAMUSCULAR; INTRAVENOUS at 02:31

## 2020-03-01 NOTE — ED NOTES
ER tech at bedside pt states \"I not going anywhere, I dont have legs\". MD Pawel Graf made aware of pt trying to get OOB.       Caldwell Siemens, RN  03/01/20 5946

## 2020-03-01 NOTE — ED NOTES
Pt states, \"Everybody hates me, you hate me\" because staff will not remove restraints. Explained to pt that staff is looking out for safety.      Ami Peralta  03/01/20 0226

## 2020-03-01 NOTE — ED NOTES
RN at bedside assisting pt with urinal. Pt than asked this RN \"can you shake my penis\". Pt redirected about inappropriate language, pt than stated \"Im sorry\".       Chao Mcahuca RN  03/01/20 6171

## 2020-03-01 NOTE — ED NOTES
Transferred to [facility] with belongings at [address] [place] [address].    No c/o pain     Hoyt Primrose, SE  03/01/20 8160

## 2020-03-01 NOTE — ED PROVIDER NOTES
mouth 3 times daily    DULOXETINE (CYMBALTA) 60 MG EXTENDED RELEASE CAPSULE    Take 1 capsule by mouth daily    ESCITALOPRAM (LEXAPRO) 10 MG TABLET    TAKE ONE TABLET BY MOUTH DAILY    GABAPENTIN (NEURONTIN) 800 MG TABLET    Take 1 tablet by mouth 3 times daily for 30 days. LABETALOL (NORMODYNE) 300 MG TABLET    Take 1 tablet by mouth 2 times daily    LOSARTAN-HYDROCHLOROTHIAZIDE (HYZAAR) 100-25 MG PER TABLET    Take 1 tablet by mouth daily    OMEPRAZOLE (PRILOSEC) 20 MG DELAYED RELEASE CAPSULE    Take 1 capsule by mouth Daily    ROSUVASTATIN (CRESTOR) 5 MG TABLET    Take 1 tablet by mouth nightly       ALLERGIES     Niacin and related and Statins [statins]    FAMILY HISTORY       Family History   Problem Relation Age of Onset    Heart Disease Mother     High Cholesterol Mother     High Blood Pressure Mother     Arthritis Mother     Heart Disease Father     Cancer Father         lung    Diabetes Father     High Cholesterol Father     High Blood Pressure Father     Cancer Sister     High Blood Pressure Sister     Diabetes Sister     High Blood Pressure Brother           SOCIAL HISTORY       Social History     Socioeconomic History    Marital status: Single     Spouse name: None    Number of children: None    Years of education: None    Highest education level: None   Occupational History    Occupation: Disabled     Comment: 2007   Social Needs    Financial resource strain: None    Food insecurity:     Worry: None     Inability: None    Transportation needs:     Medical: None     Non-medical: None   Tobacco Use    Smoking status: Current Every Day Smoker     Packs/day: 2.00     Years: 40.00     Pack years: 80.00     Types: Cigarettes    Smokeless tobacco: Never Used   Substance and Sexual Activity    Alcohol use:  Yes     Alcohol/week: 1.0 - 2.0 standard drinks     Types: 1 - 2 Shots of liquor per week    Drug use: Yes     Frequency: 1.0 times per week     Types: Marijuana     Comment: 2 x week    Sexual activity: Never   Lifestyle    Physical activity:     Days per week: None     Minutes per session: None    Stress: None   Relationships    Social connections:     Talks on phone: None     Gets together: None     Attends [employer] service: None     Active member of club or organization: None     Attends meetings of clubs or organizations: None     Relationship status: None    Intimate partner violence:     Fear of current or ex partner: None     Emotionally abused: None     Physically abused: None     Forced sexual activity: None   Other Topics Concern    None   Social History Narrative    None       SCREENINGS      @FLOW([id number])@      PHYSICAL EXAM    (up to 7 for level 4, 8 or more for level 5)     ED Triage Vitals [02/29/20 2207]   BP Temp Temp Source Pulse Resp SpO2 Height Weight   121/62 98.7 °F (37.1 °C) Axillary 91 20 (!) 82 % -- 194 lb 0.1 oz (88 kg)       Physical Exam    DIAGNOSTIC RESULTS     EKG: All EKG's are interpreted by the Emergency Department Physician who either signs or Co-signsthis chart in the absence of a cardiologist.    Patient's EKG shows a sinus rhythm with a ventricular rate of 88 bpm.  Patient's IL interval is prolonged. QTc interval is prolonged. Patient has left axis deviation. There are no significant ST elevations depressions EKG is nondiagnostic for ACS. Compared EKG from 1/6/2019 and appreciate significant changes.      RADIOLOGY:   Non-plain filmimages such as CT, Ultrasound and MRI are read by the radiologist. Plain radiographic images are visualized and preliminarily interpreted by the emergency physician with the below findings:      Interpretation per the Radiologist below, if available at the time ofthis note:    CT Head WO Contrast    (Results Pending)   XR CHEST PORTABLE    (Results Pending)         ED BEDSIDE ULTRASOUND:   Performed by ED Physician - none    LABS:  Labs Reviewed   PROTIME-INR    Narrative:     Performed at:  [facility] reevaluation he is resting comfortably. Transportation arranged the patient to go back to his nursing facility. As the patient has no significant findings on CT or laboratory work-up believe he is safe for discharge home. CRITICAL CARE TIME   Total Critical Care time was 35 minutes, excluding separatelyreportable procedures. There was a high probability ofclinically significant/life threatening deterioration in the patient's condition which required my urgent intervention. CONSULTS:  None    PROCEDURES:  Unless otherwise noted below, none     Procedures    FINAL IMPRESSION      1. Altered mental status, unspecified altered mental status type          DISPOSITION/PLAN   DISPOSITION        PATIENT REFERREDTO:  No follow-up provider specified.     DISCHARGEMEDICATIONS:  New Prescriptions    No medications on file          (Please note that portions of this note were completed with a voice recognition program.  Efforts were made to edit the dictations but occasionally words are mis-transcribed.)    Devin Martinez MD (electronically signed)  Attending Emergency Physician          Devin Martinez MD  03/02/20 0996

## 2020-03-01 NOTE — ED NOTES
Pt remains responsive to painful stimuli, spontaneous respirations. Spo2 100% on NRB. SR on monitor. BP stable. Fall risk screening completed. Fall risk bracelet applied to patient. Non-skid socks provided and placed on patient. The fall risk is indicated using  dome light . Based on score, a bed alarm was indicated and applied. The call light is within the patient's reach, and instructions for use were provided. The bed is in the lowest position with wheels locked. The patient has been advised to notify staff, using the call light, if there is a need to get up or use restroom. The patient verbalized understanding of safety precautions and how to contact staff for assistance.         Sophie Vaca RN  02/29/20 9975

## 2020-03-01 NOTE — ED NOTES
RN at bedside pt pulled IV out along with pulling monitor off wall. RN tried several times to redirect pts inappropriate  Behavior. Pt states \"Im sorry\" but continue to try to claim OOB. MD Keyes Noss at bedside.       David Villalpando, SE  03/01/20 7665

## 2020-03-01 NOTE — ED NOTES
Pt continues to rest on stretcher, spontaneous respirations. Moving upper extremities spontaneously. Spo2 100%. SR on monitor. BP stable.       Sophie Vaca RN  03/01/20 4350

## 2020-03-01 NOTE — ED NOTES
Pt presents to ED via [facility] from [place]. [facility] for altered mental status. Nursing home staff state pt went out for a smoke break and upon returning smelled of alcohol and marijuana. EMS report that upon there arrival pt was combative and unable to follow commands. He was given 10 mg versed IM. EMS reports an episode of emesis shortly after this. Upon arrival to ED pt is responsive to painful stimuli. Spontaneous respirations. No active vomiting. RA Spo2 of 82%. Pt placed on NRB with improvement to 100%. SR on monitor. BP stable.       Guillermo Brewer RN  02/29/20 9452

## 2020-09-28 ENCOUNTER — HOSPITAL ENCOUNTER (INPATIENT)
Age: 60
LOS: 6 days | Discharge: HOME | DRG: 136 | End: 2020-10-04
Attending: EMERGENCY MEDICINE | Admitting: INTERNAL MEDICINE
Payer: MEDICAID

## 2020-09-28 ENCOUNTER — APPOINTMENT (OUTPATIENT)
Dept: CT IMAGING | Age: 60
DRG: 136 | End: 2020-09-28
Payer: MEDICAID

## 2020-09-28 ENCOUNTER — APPOINTMENT (OUTPATIENT)
Dept: GENERAL RADIOLOGY | Age: 60
DRG: 136 | End: 2020-09-28
Payer: MEDICAID

## 2020-09-28 PROBLEM — I87.1 SUPERIOR VENA CAVA COMPRESSION SYNDROME: Status: ACTIVE | Noted: 2020-09-28

## 2020-09-28 LAB
A/G RATIO: 2 (ref 1.1–2.2)
ALBUMIN SERPL-MCNC: 4.3 G/DL (ref 3.4–5)
ALP BLD-CCNC: 127 U/L (ref 40–129)
ALT SERPL-CCNC: 12 U/L (ref 10–40)
ANION GAP SERPL CALCULATED.3IONS-SCNC: 12 MMOL/L (ref 3–16)
AST SERPL-CCNC: 12 U/L (ref 15–37)
BASOPHILS ABSOLUTE: 0 K/UL (ref 0–0.2)
BASOPHILS RELATIVE PERCENT: 0.5 %
BILIRUB SERPL-MCNC: 0.3 MG/DL (ref 0–1)
BUN BLDV-MCNC: 5 MG/DL (ref 7–20)
CALCIUM SERPL-MCNC: 9.2 MG/DL (ref 8.3–10.6)
CHLORIDE BLD-SCNC: 93 MMOL/L (ref 99–110)
CO2: 26 MMOL/L (ref 21–32)
CREAT SERPL-MCNC: 0.6 MG/DL (ref 0.8–1.3)
EOSINOPHILS ABSOLUTE: 0.1 K/UL (ref 0–0.6)
EOSINOPHILS RELATIVE PERCENT: 1 %
GFR AFRICAN AMERICAN: >60
GFR NON-AFRICAN AMERICAN: >60
GLOBULIN: 2.2 G/DL
GLUCOSE BLD-MCNC: 98 MG/DL (ref 70–99)
HCT VFR BLD CALC: 37.5 % (ref 40.5–52.5)
HEMOGLOBIN: 12.8 G/DL (ref 13.5–17.5)
LYMPHOCYTES ABSOLUTE: 1 K/UL (ref 1–5.1)
LYMPHOCYTES RELATIVE PERCENT: 9.4 %
MCH RBC QN AUTO: 30.4 PG (ref 26–34)
MCHC RBC AUTO-ENTMCNC: 34.2 G/DL (ref 31–36)
MCV RBC AUTO: 88.9 FL (ref 80–100)
MONOCYTES ABSOLUTE: 0.6 K/UL (ref 0–1.3)
MONOCYTES RELATIVE PERCENT: 6.3 %
NEUTROPHILS ABSOLUTE: 8.5 K/UL (ref 1.7–7.7)
NEUTROPHILS RELATIVE PERCENT: 82.8 %
PDW BLD-RTO: 14 % (ref 12.4–15.4)
PLATELET # BLD: 393 K/UL (ref 135–450)
PMV BLD AUTO: 6.9 FL (ref 5–10.5)
POTASSIUM SERPL-SCNC: 4.2 MMOL/L (ref 3.5–5.1)
RBC # BLD: 4.22 M/UL (ref 4.2–5.9)
SODIUM BLD-SCNC: 131 MMOL/L (ref 136–145)
TOTAL PROTEIN: 6.5 G/DL (ref 6.4–8.2)
TROPONIN: <0.01 NG/ML
WBC # BLD: 10.2 K/UL (ref 4–11)

## 2020-09-28 PROCEDURE — 71045 X-RAY EXAM CHEST 1 VIEW: CPT

## 2020-09-28 PROCEDURE — 99285 EMERGENCY DEPT VISIT HI MDM: CPT

## 2020-09-28 PROCEDURE — 6360000002 HC RX W HCPCS: Performed by: INTERNAL MEDICINE

## 2020-09-28 PROCEDURE — 71260 CT THORAX DX C+: CPT

## 2020-09-28 PROCEDURE — 6360000002 HC RX W HCPCS: Performed by: PHYSICIAN ASSISTANT

## 2020-09-28 PROCEDURE — 2700000000 HC OXYGEN THERAPY PER DAY

## 2020-09-28 PROCEDURE — 85025 COMPLETE CBC W/AUTO DIFF WBC: CPT

## 2020-09-28 PROCEDURE — 94760 N-INVAS EAR/PLS OXIMETRY 1: CPT

## 2020-09-28 PROCEDURE — 80053 COMPREHEN METABOLIC PANEL: CPT

## 2020-09-28 PROCEDURE — 96376 TX/PRO/DX INJ SAME DRUG ADON: CPT

## 2020-09-28 PROCEDURE — 84484 ASSAY OF TROPONIN QUANT: CPT

## 2020-09-28 PROCEDURE — 1200000000 HC SEMI PRIVATE

## 2020-09-28 PROCEDURE — 6360000004 HC RX CONTRAST MEDICATION: Performed by: PHYSICIAN ASSISTANT

## 2020-09-28 PROCEDURE — 96374 THER/PROPH/DIAG INJ IV PUSH: CPT

## 2020-09-28 PROCEDURE — 93005 ELECTROCARDIOGRAM TRACING: CPT | Performed by: EMERGENCY MEDICINE

## 2020-09-28 PROCEDURE — 6370000000 HC RX 637 (ALT 250 FOR IP): Performed by: PHYSICIAN ASSISTANT

## 2020-09-28 RX ORDER — MAGNESIUM SULFATE IN WATER 40 MG/ML
2 INJECTION, SOLUTION INTRAVENOUS PRN
Status: DISCONTINUED | OUTPATIENT
Start: 2020-09-28 | End: 2020-10-04 | Stop reason: HOSPADM

## 2020-09-28 RX ORDER — POTASSIUM CHLORIDE 7.45 MG/ML
10 INJECTION INTRAVENOUS PRN
Status: DISCONTINUED | OUTPATIENT
Start: 2020-09-28 | End: 2020-10-04 | Stop reason: HOSPADM

## 2020-09-28 RX ORDER — POTASSIUM CHLORIDE 750 MG/1
10 CAPSULE, EXTENDED RELEASE ORAL DAILY
COMMUNITY

## 2020-09-28 RX ORDER — PROMETHAZINE HYDROCHLORIDE 25 MG/1
12.5 TABLET ORAL EVERY 6 HOURS PRN
Status: DISCONTINUED | OUTPATIENT
Start: 2020-09-28 | End: 2020-10-04 | Stop reason: HOSPADM

## 2020-09-28 RX ORDER — HYDRALAZINE HYDROCHLORIDE 25 MG/1
25 TABLET, FILM COATED ORAL 3 TIMES DAILY
Status: DISCONTINUED | OUTPATIENT
Start: 2020-09-29 | End: 2020-10-04 | Stop reason: HOSPADM

## 2020-09-28 RX ORDER — DULOXETIN HYDROCHLORIDE 60 MG/1
60 CAPSULE, DELAYED RELEASE ORAL DAILY
Status: DISCONTINUED | OUTPATIENT
Start: 2020-09-29 | End: 2020-10-04 | Stop reason: HOSPADM

## 2020-09-28 RX ORDER — CLONIDINE HYDROCHLORIDE 0.1 MG/1
0.3 TABLET ORAL 3 TIMES DAILY
Status: DISCONTINUED | OUTPATIENT
Start: 2020-09-29 | End: 2020-10-04 | Stop reason: HOSPADM

## 2020-09-28 RX ORDER — ROSUVASTATIN CALCIUM 10 MG/1
5 TABLET, COATED ORAL NIGHTLY
Status: DISCONTINUED | OUTPATIENT
Start: 2020-09-29 | End: 2020-10-04 | Stop reason: HOSPADM

## 2020-09-28 RX ORDER — SODIUM CHLORIDE 0.9 % (FLUSH) 0.9 %
10 SYRINGE (ML) INJECTION PRN
Status: DISCONTINUED | OUTPATIENT
Start: 2020-09-28 | End: 2020-10-04 | Stop reason: HOSPADM

## 2020-09-28 RX ORDER — HYDRALAZINE HYDROCHLORIDE 25 MG/1
25 TABLET, FILM COATED ORAL 3 TIMES DAILY
COMMUNITY

## 2020-09-28 RX ORDER — MORPHINE SULFATE 4 MG/ML
4 INJECTION, SOLUTION INTRAMUSCULAR; INTRAVENOUS EVERY 4 HOURS PRN
Status: DISCONTINUED | OUTPATIENT
Start: 2020-09-28 | End: 2020-09-29

## 2020-09-28 RX ORDER — SODIUM CHLORIDE 0.9 % (FLUSH) 0.9 %
10 SYRINGE (ML) INJECTION EVERY 12 HOURS SCHEDULED
Status: DISCONTINUED | OUTPATIENT
Start: 2020-09-29 | End: 2020-10-04 | Stop reason: HOSPADM

## 2020-09-28 RX ORDER — DEXAMETHASONE SODIUM PHOSPHATE 10 MG/ML
10 INJECTION INTRAMUSCULAR; INTRAVENOUS EVERY 12 HOURS
Status: DISCONTINUED | OUTPATIENT
Start: 2020-09-29 | End: 2020-10-02

## 2020-09-28 RX ORDER — ONDANSETRON 2 MG/ML
4 INJECTION INTRAMUSCULAR; INTRAVENOUS EVERY 6 HOURS PRN
Status: DISCONTINUED | OUTPATIENT
Start: 2020-09-28 | End: 2020-10-04 | Stop reason: HOSPADM

## 2020-09-28 RX ORDER — DEXAMETHASONE SODIUM PHOSPHATE 4 MG/ML
10 INJECTION, SOLUTION INTRA-ARTICULAR; INTRALESIONAL; INTRAMUSCULAR; INTRAVENOUS; SOFT TISSUE ONCE
Status: COMPLETED | OUTPATIENT
Start: 2020-09-28 | End: 2020-09-28

## 2020-09-28 RX ORDER — ESCITALOPRAM OXALATE 10 MG/1
10 TABLET ORAL DAILY
Status: DISCONTINUED | OUTPATIENT
Start: 2020-09-29 | End: 2020-10-04 | Stop reason: HOSPADM

## 2020-09-28 RX ORDER — DIPHENHYDRAMINE HCL 25 MG
12.5 TABLET ORAL EVERY 6 HOURS PRN
COMMUNITY

## 2020-09-28 RX ORDER — CLONIDINE HYDROCHLORIDE 0.1 MG/1
0.3 TABLET ORAL ONCE
Status: COMPLETED | OUTPATIENT
Start: 2020-09-28 | End: 2020-09-28

## 2020-09-28 RX ORDER — ASPIRIN 325 MG
325 TABLET ORAL DAILY
Status: DISCONTINUED | OUTPATIENT
Start: 2020-09-29 | End: 2020-10-04 | Stop reason: HOSPADM

## 2020-09-28 RX ORDER — MORPHINE SULFATE 4 MG/ML
4 INJECTION, SOLUTION INTRAMUSCULAR; INTRAVENOUS ONCE
Status: COMPLETED | OUTPATIENT
Start: 2020-09-28 | End: 2020-09-28

## 2020-09-28 RX ADMIN — MORPHINE SULFATE 4 MG: 4 INJECTION INTRAVENOUS at 19:58

## 2020-09-28 RX ADMIN — IOPAMIDOL 75 ML: 755 INJECTION, SOLUTION INTRAVENOUS at 19:27

## 2020-09-28 RX ADMIN — CLONIDINE HYDROCHLORIDE 0.3 MG: 0.1 TABLET ORAL at 19:15

## 2020-09-28 RX ADMIN — MORPHINE SULFATE 4 MG: 4 INJECTION, SOLUTION INTRAMUSCULAR; INTRAVENOUS at 18:16

## 2020-09-28 RX ADMIN — DEXAMETHASONE SODIUM PHOSPHATE 10 MG: 4 INJECTION, SOLUTION INTRAMUSCULAR; INTRAVENOUS at 22:12

## 2020-09-28 ASSESSMENT — ENCOUNTER SYMPTOMS
FACIAL SWELLING: 0
EYE DISCHARGE: 0
NAUSEA: 0
ABDOMINAL PAIN: 0
EYE REDNESS: 0
CHOKING: 0
BACK PAIN: 0
VOMITING: 0
SHORTNESS OF BREATH: 0
APNEA: 0

## 2020-09-28 ASSESSMENT — PAIN DESCRIPTION - PAIN TYPE: TYPE: ACUTE PAIN

## 2020-09-28 ASSESSMENT — PAIN DESCRIPTION - LOCATION
LOCATION: CHEST
LOCATION: CHEST

## 2020-09-28 ASSESSMENT — PAIN SCALES - GENERAL
PAINLEVEL_OUTOF10: 10
PAINLEVEL_OUTOF10: 7
PAINLEVEL_OUTOF10: 8
PAINLEVEL_OUTOF10: 5
PAINLEVEL_OUTOF10: 8
PAINLEVEL_OUTOF10: 9

## 2020-09-28 ASSESSMENT — PAIN DESCRIPTION - ORIENTATION: ORIENTATION: RIGHT;LEFT

## 2020-09-28 ASSESSMENT — PAIN DESCRIPTION - FREQUENCY: FREQUENCY: INTERMITTENT

## 2020-09-28 ASSESSMENT — PAIN - FUNCTIONAL ASSESSMENT: PAIN_FUNCTIONAL_ASSESSMENT: PREVENTS OR INTERFERES SOME ACTIVE ACTIVITIES AND ADLS

## 2020-09-28 ASSESSMENT — PAIN DESCRIPTION - DESCRIPTORS
DESCRIPTORS: SHARP
DESCRIPTORS: SHARP

## 2020-09-28 ASSESSMENT — PAIN DESCRIPTION - PROGRESSION: CLINICAL_PROGRESSION: NOT CHANGED

## 2020-09-28 ASSESSMENT — PAIN DESCRIPTION - ONSET: ONSET: ON-GOING

## 2020-09-28 NOTE — ED PROVIDER NOTES
[address] [facility]      Pt Name: Juvencio Rodríguez  VNK:[id number]  [place] 1960  Date of evaluation: 9/28/2020  Provider: Zach Handley PA-C     This patient was seen by attending physician Dr. Erickson Alejo MD    Chief Complaint:    Chief Complaint   Patient presents with    Chest Pain       Nursing Notes, Past Medical Hx, Past Surgical Hx, Social Hx, Allergies, and Family Hx were all reviewed and agreed with or any disagreements were addressed in the HPI.    HPI:  (Location, Duration, Timing, Severity, Quality, Assoc Sx, Context, Modifying factors)  This is a  62 y.o. male complained of chest pain. Patient state about a hour before his arrival here to emergency room he started having this pain to the right upper chest.  Feels like sharp pain. Does not radiate down his arm or into his neck. Does not radiate to his back. Denies diaphoresis associated with it no abdominal pain. 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 He is not on blood thinner. Says he was out of his blood pressure medication and the nursing home has not given him any of his medication. Denies upper extremity weakness. No cough, denies fever. No other complaints.       PastMedical/Surgical History:      Diagnosis Date    Anxiety     Heart disease     Hyperlipidemia     triglycerides    Hypertension     Peripheral vascular disease, unspecified ([facility] [address]     bilateral AKA amputation    Phantom limb (syndrome)     Type II or unspecified type diabetes mellitus without mention of complication, not stated as uncontrolled          Procedure Laterality Date    ABOVE KNEE AMPUTATION  2009,2010    both    DENTAL SURGERY  4-14       Medications:  Previous Medications    ASPIRIN 325 MG TABLET    Take 1 tablet by mouth daily    CLONIDINE (CATAPRES) 0.3 MG TABLET    Take 1 tablet by mouth 3 times daily DIPHENHYDRAMINE (BENADRYL) 25 MG TABLET    Take 12.5 mg by mouth every 6 hours as needed for Itching    DULOXETINE (CYMBALTA) 60 MG EXTENDED RELEASE CAPSULE    Take 1 capsule by mouth daily    ESCITALOPRAM (LEXAPRO) 10 MG TABLET    TAKE ONE TABLET BY MOUTH DAILY    HYDRALAZINE (APRESOLINE) 25 MG TABLET    Take 25 mg by mouth 3 times daily    LABETALOL (NORMODYNE) 300 MG TABLET    Take 1 tablet by mouth 2 times daily    LIDOCAINE-GLYCERIN 5-14.4 % CREA    Place rectally every 6 hours as needed (hemorroids)    OMEPRAZOLE (PRILOSEC) 20 MG DELAYED RELEASE CAPSULE    Take 1 capsule by mouth Daily    POTASSIUM CHLORIDE (MICRO-K) 10 MEQ EXTENDED RELEASE CAPSULE    Take 10 mEq by mouth daily    ROSUVASTATIN (CRESTOR) 5 MG TABLET    Take 1 tablet by mouth nightly         Review of Systems:  Review of Systems   Constitutional: Negative for chills and fever. HENT: Negative for congestion, facial swelling and sneezing. Eyes: Negative for discharge and redness. Respiratory: Negative for apnea, choking and shortness of breath. Cardiovascular: Positive for chest pain. Gastrointestinal: Negative for abdominal pain, nausea and vomiting. Genitourinary: Negative for dysuria. Musculoskeletal: Negative for back pain, neck pain and neck stiffness. Neurological: Negative for dizziness, tremors, seizures and headaches. All other systems reviewed and are negative. Positives and Pertinent negatives as per HPI. Except as noted above in the ROS, problem specific ROS was completed and is negative. Physical Exam:  Physical Exam  Vitals signs and nursing note reviewed. Constitutional:       Appearance: He is well-developed. He is not diaphoretic. HENT:      Head: Normocephalic and atraumatic. Nose: Nose normal.      Mouth/Throat:      Mouth: Mucous membranes are moist.      Pharynx: Oropharynx is clear. Eyes:      General:         Right eye: No discharge. Left eye: No discharge.    Neck: Musculoskeletal: Normal range of motion and neck supple. Cardiovascular:      Rate and Rhythm: Normal rate and regular rhythm. Heart sounds: Normal heart sounds. No murmur. No friction rub. No gallop. Pulmonary:      Effort: Pulmonary effort is normal. No respiratory distress. Breath sounds: Normal breath sounds. No wheezing or rales. Chest:      Chest wall: No tenderness. Abdominal:      General: Abdomen is flat. Bowel sounds are normal. There is no distension. Palpations: Abdomen is soft. There is no mass. Tenderness: There is no abdominal tenderness. Musculoskeletal: Normal range of motion. Skin:     General: Skin is warm and dry. Neurological:      Mental Status: He is alert and oriented to person, place, and time.    Psychiatric:         Behavior: Behavior normal.         MEDICAL DECISION MAKING    Vitals:    Vitals:    09/28/20 1926 09/1960 09/28/20 2027 09/28/20 2132   BP: (!) 189/93 (!) 181/114 (!) 167/65 (!) 163/107   Pulse: 88 96 98 98   Resp: 20 20 22 20   Temp:       TempSrc:       SpO2: 100% 98% 98% 98%   Weight:       Height:           LABS:  Labs Reviewed   CBC WITH AUTO DIFFERENTIAL - Abnormal; Notable for the following components:       Result Value    Hemoglobin 12.8 (*)     Hematocrit 37.5 (*)     Neutrophils Absolute 8.5 (*)     All other components within normal limits    Narrative:     Performed at:  [address] [address]   Phone [phone]   COMPREHENSIVE METABOLIC PANEL - Abnormal; Notable for the following components:    Sodium 131 (*)     Chloride 93 (*)     BUN 5 (*)     CREATININE 0.6 (*)     AST 12 (*)     All other components within normal limits    Narrative:     Performed at:  [facility]  [address] [place], [place] [address]   Phone [phone]   TROPONIN    Narrative:     Performed at:  [facility]  [address] [facility] [place] [address]   Phone ([phone] of labs reviewed and werenegative at this time or not returned at the time of this note. RADIOLOGY:   Non-plain film images such as CT, Ultrasound and MRI are read by the radiologist. Fifi Park PA-C have directly visualized the radiologic plain film image(s) with the below findings:        Interpretation per the Radiologist below, if available at the time of this note:    CT CHEST PULMONARY EMBOLISM W CONTRAST   Preliminary Result   No evidence of pulmonary embolism. Large 8.3 cm right suprahilar/upper lobe pulmonic mass which is confluent   with enlarged right hilar and pretracheal lymph nodes. This is consistent   with bronchogenic carcinoma. The mass significantly narrows the traversing   mid SVC segment and the presence of fat infiltration of the mediastinum could   indicate some degree of SVC syndrome/obstruction. Trace bilateral pleural effusions. XR CHEST PORTABLE   Final Result   1. New right hilar/suprahilar mass. Recommend CT chest   2. Left pleural effusion              No results found. MEDICAL DECISION MAKING / ED COURSE:      PROCEDURES:   Procedures    None    Patient was given:  Medications   morphine (PF) injection 4 mg (4 mg Intravenous Given 9/28/20 1816)   iopamidol (ISOVUE-370) 76 % injection 75 mL (75 mLs Intravenous Given 9/28/20 1927)   cloNIDine (CATAPRES) tablet 0.3 mg (0.3 mg Oral Given 9/28/20 1915)   morphine (PF) injection 4 mg (4 mg Intravenous Given 9/1960)       Emergency room course: Patient on exam pupils equal round and reactive to light extraocular movement is intact. Throat is clear. Cardiovascular regular rhythm, lungs are clear. No wheeze, rales or rhonchi noted. No reproducible chest wall tenderness with palpation. Patient full range of motion of all extremity upper extremity. Abdomen is soft nontender. Normal bowel sounds all 4 quadrant.   He is alert oriented x4. Does not appear to be in acute distress. Lab results from today shows:  CBC within normal limits with a white count of 10.2. CMP shows sodium 131, potassium 4.2, chloride 93 with a BUN of 5 and creatinine 0.6. Normal transaminases. Troponin less than 0.01    Chest x-ray shows a new right hilar suprahilar mass. Recommend CT chest.  Left pleural effusion. CT chest rule out PE shows no evidence of pulmonary embolism. Large 8.3 cm right suprahilar upper lobe pulmonary mass with confluent with enlarged right hilar and pretracheal lymph nodes. This is consistent with bronchogenic carcinoma. The mass significant narrows the traversing mid SVC segment and the presence of fat infiltration of the mediastinum could indicate some degree of SVC syndrome obstruction. I discussed patient CT and x-ray results and lab result from today with him. I placed a call out to hospitalist service for admission. Patient was okay with being admitted. Hospitalist accepted this patient for admission. The patient tolerated their visit well. I evaluated the patient. The physician was available for consultation as needed. The patient and / or the family were informed of the results of any tests, a time was given to answer questions, a plan was proposed and they agreed with plan. CLINICAL IMPRESSION:  1. Chest pain, unspecified type    2. Lung mass        DISPOSITION Decision To Admit 09/28/2020 09:56:43 PM      PATIENT REFERRED TO:  No follow-up provider specified. DISCHARGE MEDICATIONS:  New Prescriptions    No medications on file       DISCONTINUED MEDICATIONS:  Discontinued Medications    GABAPENTIN (NEURONTIN) 800 MG TABLET    Take 1 tablet by mouth 3 times daily for 30 days.     LOSARTAN-HYDROCHLOROTHIAZIDE (HYZAAR) 100-25 MG PER TABLET    Take 1 tablet by mouth daily              (Please note the MDM and HPI sections of this note were completed with a voice recognition program. Efforts were made to edit the dictations but occasionally words are mis-transcribed.)    Electronically signed, Manjula Ordonez PA-C,          Manjula Ordonez PA-C  10/01/20 6944

## 2020-09-28 NOTE — ED NOTES
This RN spoke to nurse from facility and she states patient did take his scheduled AM medications and was due for Clonidine. He was outside smoking and came inside and stated his chest hurt and he couldn't breath. She was unable to get a BP so she did not give any mediation prior to his leaving with squad. Per the nurse the patient used to say that he didn't get his medications often, but had not been doing that recently. She states he does not follow the rules and will light cigarettes in his room and will stay outside all day smoking up to 2 packs a day.   PA updated      Shad Rogers RN  09/28/20 8229

## 2020-09-28 NOTE — ED NOTES
Bed: [facility]  Expected date: 9/28/20  Expected time: 4:39 PM  Means of arrival: [facility]  Comments:  59M Chest pain      Della Nelson RN  09/28/20 1341

## 2020-09-28 NOTE — ED PROVIDER NOTES
dictations but occasionally words are mis-transcribed.)    Yue Morton MD  Attending Emergency Physician        Vickie Adan MD  09/28/20 2040       Vickie Adan MD  10/04/20 [phone]

## 2020-09-28 NOTE — ED TRIAGE NOTES
Patient arrived via squad for complaints of chest pain. He states he was watching TV when this started, history of heart disease. Patient states needs oxygen, he is 99% on RA, patient placed on 1L for comfort. Patient is bilateral above the knee amputation that was done about 10 years ago due to blood clots in his legs he states. Says sharp pain with inhaling and rates 8/10.   Lived in [address] [facility], states he thinks there is zero COVID patients there at this time, he was tested in the past and negative

## 2020-09-29 ENCOUNTER — APPOINTMENT (OUTPATIENT)
Dept: NUCLEAR MEDICINE | Age: 60
DRG: 136 | End: 2020-09-29
Payer: MEDICAID

## 2020-09-29 ENCOUNTER — ANESTHESIA EVENT (OUTPATIENT)
Dept: ENDOSCOPY | Age: 60
DRG: 136 | End: 2020-09-29
Payer: MEDICAID

## 2020-09-29 ENCOUNTER — APPOINTMENT (OUTPATIENT)
Dept: CT IMAGING | Age: 60
DRG: 136 | End: 2020-09-29
Payer: MEDICAID

## 2020-09-29 ENCOUNTER — ANESTHESIA (OUTPATIENT)
Dept: ENDOSCOPY | Age: 60
DRG: 136 | End: 2020-09-29
Payer: MEDICAID

## 2020-09-29 VITALS
TEMPERATURE: 98.6 F | SYSTOLIC BLOOD PRESSURE: 113 MMHG | DIASTOLIC BLOOD PRESSURE: 66 MMHG | OXYGEN SATURATION: 97 % | RESPIRATION RATE: 23 BRPM

## 2020-09-29 LAB
GLUCOSE BLD-MCNC: 159 MG/DL (ref 70–99)
GLUCOSE BLD-MCNC: 164 MG/DL (ref 70–99)
PERFORMED ON: ABNORMAL
PERFORMED ON: ABNORMAL
SARS-COV-2, NAAT: NOT DETECTED

## 2020-09-29 PROCEDURE — 99223 1ST HOSP IP/OBS HIGH 75: CPT | Performed by: INTERNAL MEDICINE

## 2020-09-29 PROCEDURE — 6370000000 HC RX 637 (ALT 250 FOR IP): Performed by: INTERNAL MEDICINE

## 2020-09-29 PROCEDURE — 3700000000 HC ANESTHESIA ATTENDED CARE: Performed by: INTERNAL MEDICINE

## 2020-09-29 PROCEDURE — 2580000003 HC RX 258: Performed by: NURSE ANESTHETIST, CERTIFIED REGISTERED

## 2020-09-29 PROCEDURE — 2500000003 HC RX 250 WO HCPCS: Performed by: NURSE ANESTHETIST, CERTIFIED REGISTERED

## 2020-09-29 PROCEDURE — 3430000000 HC RX DIAGNOSTIC RADIOPHARMACEUTICAL: Performed by: INTERNAL MEDICINE

## 2020-09-29 PROCEDURE — A9503 TC99M MEDRONATE: HCPCS | Performed by: INTERNAL MEDICINE

## 2020-09-29 PROCEDURE — 31653 BRONCH EBUS SAMPLNG 3/> NODE: CPT | Performed by: INTERNAL MEDICINE

## 2020-09-29 PROCEDURE — U0002 COVID-19 LAB TEST NON-CDC: HCPCS

## 2020-09-29 PROCEDURE — 88342 IMHCHEM/IMCYTCHM 1ST ANTB: CPT

## 2020-09-29 PROCEDURE — 2580000003 HC RX 258: Performed by: INTERNAL MEDICINE

## 2020-09-29 PROCEDURE — 31629 BRONCHOSCOPY/NEEDLE BX EACH: CPT | Performed by: INTERNAL MEDICINE

## 2020-09-29 PROCEDURE — 88173 CYTOPATH EVAL FNA REPORT: CPT

## 2020-09-29 PROCEDURE — 3609020000 HC BRONCHOSCOPY W/EBUS FNA: Performed by: INTERNAL MEDICINE

## 2020-09-29 PROCEDURE — 88341 IMHCHEM/IMCYTCHM EA ADD ANTB: CPT

## 2020-09-29 PROCEDURE — C1725 CATH, TRANSLUMIN NON-LASER: HCPCS | Performed by: INTERNAL MEDICINE

## 2020-09-29 PROCEDURE — 0BBM8ZX EXCISION OF BILATERAL LUNGS, VIA NATURAL OR ARTIFICIAL OPENING ENDOSCOPIC, DIAGNOSTIC: ICD-10-PCS | Performed by: INTERNAL MEDICINE

## 2020-09-29 PROCEDURE — 31633 BRONCHOSCOPY/NEEDLE BX ADDL: CPT | Performed by: INTERNAL MEDICINE

## 2020-09-29 PROCEDURE — 3609027000 HC BRONCHOSCOPY: Performed by: INTERNAL MEDICINE

## 2020-09-29 PROCEDURE — 2709999900 HC NON-CHARGEABLE SUPPLY: Performed by: INTERNAL MEDICINE

## 2020-09-29 PROCEDURE — 6360000002 HC RX W HCPCS: Performed by: INTERNAL MEDICINE

## 2020-09-29 PROCEDURE — 6360000004 HC RX CONTRAST MEDICATION: Performed by: INTERNAL MEDICINE

## 2020-09-29 PROCEDURE — 7100000001 HC PACU RECOVERY - ADDTL 15 MIN: Performed by: INTERNAL MEDICINE

## 2020-09-29 PROCEDURE — 6360000002 HC RX W HCPCS: Performed by: NURSE ANESTHETIST, CERTIFIED REGISTERED

## 2020-09-29 PROCEDURE — 3700000001 HC ADD 15 MINUTES (ANESTHESIA): Performed by: INTERNAL MEDICINE

## 2020-09-29 PROCEDURE — 94761 N-INVAS EAR/PLS OXIMETRY MLT: CPT

## 2020-09-29 PROCEDURE — 2500000003 HC RX 250 WO HCPCS: Performed by: INTERNAL MEDICINE

## 2020-09-29 PROCEDURE — 1200000000 HC SEMI PRIVATE

## 2020-09-29 PROCEDURE — 74177 CT ABD & PELVIS W/CONTRAST: CPT

## 2020-09-29 PROCEDURE — 88305 TISSUE EXAM BY PATHOLOGIST: CPT

## 2020-09-29 PROCEDURE — 88177 CYTP FNA EVAL EA ADDL: CPT

## 2020-09-29 PROCEDURE — 2700000000 HC OXYGEN THERAPY PER DAY

## 2020-09-29 PROCEDURE — 78306 BONE IMAGING WHOLE BODY: CPT

## 2020-09-29 PROCEDURE — 7100000000 HC PACU RECOVERY - FIRST 15 MIN: Performed by: INTERNAL MEDICINE

## 2020-09-29 PROCEDURE — 88172 CYTP DX EVAL FNA 1ST EA SITE: CPT

## 2020-09-29 PROCEDURE — 6360000004 HC RX CONTRAST MEDICATION

## 2020-09-29 RX ORDER — TC 99M MEDRONATE 20 MG/10ML
25 INJECTION, POWDER, LYOPHILIZED, FOR SOLUTION INTRAVENOUS
Status: COMPLETED | OUTPATIENT
Start: 2020-09-29 | End: 2020-09-29

## 2020-09-29 RX ORDER — SODIUM CHLORIDE 9 MG/ML
INJECTION, SOLUTION INTRAVENOUS CONTINUOUS PRN
Status: DISCONTINUED | OUTPATIENT
Start: 2020-09-29 | End: 2020-09-29 | Stop reason: SDUPTHER

## 2020-09-29 RX ORDER — LIDOCAINE HYDROCHLORIDE 20 MG/ML
INJECTION, SOLUTION EPIDURAL; INFILTRATION; INTRACAUDAL; PERINEURAL
Status: COMPLETED | OUTPATIENT
Start: 2020-09-29 | End: 2020-09-29

## 2020-09-29 RX ORDER — ONDANSETRON 2 MG/ML
INJECTION INTRAMUSCULAR; INTRAVENOUS PRN
Status: DISCONTINUED | OUTPATIENT
Start: 2020-09-29 | End: 2020-09-29 | Stop reason: SDUPTHER

## 2020-09-29 RX ORDER — MORPHINE SULFATE 4 MG/ML
4 INJECTION, SOLUTION INTRAMUSCULAR; INTRAVENOUS
Status: DISCONTINUED | OUTPATIENT
Start: 2020-09-29 | End: 2020-10-04 | Stop reason: HOSPADM

## 2020-09-29 RX ORDER — ONDANSETRON 2 MG/ML
4 INJECTION INTRAMUSCULAR; INTRAVENOUS
Status: DISCONTINUED | OUTPATIENT
Start: 2020-09-29 | End: 2020-09-29

## 2020-09-29 RX ORDER — FENTANYL CITRATE 50 UG/ML
INJECTION, SOLUTION INTRAMUSCULAR; INTRAVENOUS PRN
Status: DISCONTINUED | OUTPATIENT
Start: 2020-09-29 | End: 2020-09-29 | Stop reason: SDUPTHER

## 2020-09-29 RX ORDER — EPHEDRINE SULFATE/0.9% NACL/PF 50 MG/5 ML
SYRINGE (ML) INTRAVENOUS PRN
Status: DISCONTINUED | OUTPATIENT
Start: 2020-09-29 | End: 2020-09-29 | Stop reason: SDUPTHER

## 2020-09-29 RX ORDER — PROPOFOL 10 MG/ML
INJECTION, EMULSION INTRAVENOUS PRN
Status: DISCONTINUED | OUTPATIENT
Start: 2020-09-29 | End: 2020-09-29 | Stop reason: SDUPTHER

## 2020-09-29 RX ORDER — NICOTINE 21 MG/24HR
1 PATCH, TRANSDERMAL 24 HOURS TRANSDERMAL DAILY
Status: DISCONTINUED | OUTPATIENT
Start: 2020-09-29 | End: 2020-10-04 | Stop reason: HOSPADM

## 2020-09-29 RX ORDER — LIDOCAINE HYDROCHLORIDE 20 MG/ML
INJECTION, SOLUTION EPIDURAL; INFILTRATION; INTRACAUDAL; PERINEURAL PRN
Status: DISCONTINUED | OUTPATIENT
Start: 2020-09-29 | End: 2020-09-29 | Stop reason: SDUPTHER

## 2020-09-29 RX ORDER — DEXAMETHASONE SODIUM PHOSPHATE 4 MG/ML
INJECTION, SOLUTION INTRA-ARTICULAR; INTRALESIONAL; INTRAMUSCULAR; INTRAVENOUS; SOFT TISSUE PRN
Status: DISCONTINUED | OUTPATIENT
Start: 2020-09-29 | End: 2020-09-29 | Stop reason: SDUPTHER

## 2020-09-29 RX ADMIN — Medication 15 MG: at 12:19

## 2020-09-29 RX ADMIN — CLONIDINE HYDROCHLORIDE 0.3 MG: 0.1 TABLET ORAL at 20:59

## 2020-09-29 RX ADMIN — IOHEXOL 50 ML: 240 INJECTION, SOLUTION INTRATHECAL; INTRAVASCULAR; INTRAVENOUS; ORAL at 16:33

## 2020-09-29 RX ADMIN — HYDRALAZINE HYDROCHLORIDE 25 MG: 25 TABLET, FILM COATED ORAL at 00:21

## 2020-09-29 RX ADMIN — DEXAMETHASONE SODIUM PHOSPHATE 4 MG: 4 INJECTION, SOLUTION INTRAMUSCULAR; INTRAVENOUS at 12:10

## 2020-09-29 RX ADMIN — MORPHINE SULFATE 4 MG: 4 INJECTION, SOLUTION INTRAMUSCULAR; INTRAVENOUS at 23:16

## 2020-09-29 RX ADMIN — ESCITALOPRAM OXALATE 10 MG: 10 TABLET ORAL at 08:43

## 2020-09-29 RX ADMIN — SODIUM CHLORIDE, PRESERVATIVE FREE 10 ML: 5 INJECTION INTRAVENOUS at 08:38

## 2020-09-29 RX ADMIN — SODIUM CHLORIDE, PRESERVATIVE FREE 10 ML: 5 INJECTION INTRAVENOUS at 21:00

## 2020-09-29 RX ADMIN — Medication 20 MG: at 12:17

## 2020-09-29 RX ADMIN — SODIUM CHLORIDE: 9 INJECTION, SOLUTION INTRAVENOUS at 11:57

## 2020-09-29 RX ADMIN — ROSUVASTATIN CALCIUM 5 MG: 10 TABLET, FILM COATED ORAL at 20:58

## 2020-09-29 RX ADMIN — CLONIDINE HYDROCHLORIDE 0.3 MG: 0.1 TABLET ORAL at 13:44

## 2020-09-29 RX ADMIN — MORPHINE SULFATE 4 MG: 4 INJECTION, SOLUTION INTRAMUSCULAR; INTRAVENOUS at 00:20

## 2020-09-29 RX ADMIN — MORPHINE SULFATE 4 MG: 4 INJECTION, SOLUTION INTRAMUSCULAR; INTRAVENOUS at 08:37

## 2020-09-29 RX ADMIN — DULOXETINE HYDROCHLORIDE 60 MG: 60 CAPSULE, DELAYED RELEASE ORAL at 08:43

## 2020-09-29 RX ADMIN — HYDRALAZINE HYDROCHLORIDE 25 MG: 25 TABLET, FILM COATED ORAL at 13:44

## 2020-09-29 RX ADMIN — CLONIDINE HYDROCHLORIDE 0.3 MG: 0.1 TABLET ORAL at 08:43

## 2020-09-29 RX ADMIN — IOPAMIDOL 75 ML: 755 INJECTION, SOLUTION INTRAVENOUS at 18:08

## 2020-09-29 RX ADMIN — FENTANYL CITRATE 25 MCG: 50 INJECTION INTRAMUSCULAR; INTRAVENOUS at 12:04

## 2020-09-29 RX ADMIN — ROSUVASTATIN CALCIUM 5 MG: 10 TABLET, FILM COATED ORAL at 00:28

## 2020-09-29 RX ADMIN — LABETALOL HYDROCHLORIDE 300 MG: 200 TABLET, FILM COATED ORAL at 20:59

## 2020-09-29 RX ADMIN — ONDANSETRON 4 MG: 2 INJECTION INTRAMUSCULAR; INTRAVENOUS at 12:12

## 2020-09-29 RX ADMIN — HYDRALAZINE HYDROCHLORIDE 25 MG: 25 TABLET, FILM COATED ORAL at 08:43

## 2020-09-29 RX ADMIN — DEXAMETHASONE SODIUM PHOSPHATE 10 MG: 10 INJECTION INTRAMUSCULAR; INTRAVENOUS at 23:16

## 2020-09-29 RX ADMIN — Medication 15 MG: at 12:22

## 2020-09-29 RX ADMIN — MORPHINE SULFATE 4 MG: 4 INJECTION, SOLUTION INTRAMUSCULAR; INTRAVENOUS at 21:00

## 2020-09-29 RX ADMIN — TC 99M MEDRONATE 25 MILLICURIE: 20 INJECTION, POWDER, LYOPHILIZED, FOR SOLUTION INTRAVENOUS at 08:07

## 2020-09-29 RX ADMIN — HYDRALAZINE HYDROCHLORIDE 25 MG: 25 TABLET, FILM COATED ORAL at 21:00

## 2020-09-29 RX ADMIN — LIDOCAINE HYDROCHLORIDE 5 ML: 20 INJECTION, SOLUTION EPIDURAL; INFILTRATION; INTRACAUDAL; PERINEURAL at 12:05

## 2020-09-29 RX ADMIN — PROPOFOL 100 MG: 10 INJECTION, EMULSION INTRAVENOUS at 12:07

## 2020-09-29 RX ADMIN — LABETALOL HYDROCHLORIDE 300 MG: 200 TABLET, FILM COATED ORAL at 08:43

## 2020-09-29 RX ADMIN — Medication 10 ML: at 00:20

## 2020-09-29 RX ADMIN — MORPHINE SULFATE 4 MG: 4 INJECTION, SOLUTION INTRAMUSCULAR; INTRAVENOUS at 15:51

## 2020-09-29 RX ADMIN — PHENYLEPHRINE HYDROCHLORIDE 200 MCG: 10 INJECTION INTRAVENOUS at 12:22

## 2020-09-29 RX ADMIN — MORPHINE SULFATE 4 MG: 4 INJECTION, SOLUTION INTRAMUSCULAR; INTRAVENOUS at 13:44

## 2020-09-29 RX ADMIN — FENTANYL CITRATE 25 MCG: 50 INJECTION INTRAMUSCULAR; INTRAVENOUS at 12:12

## 2020-09-29 RX ADMIN — DEXAMETHASONE SODIUM PHOSPHATE 10 MG: 10 INJECTION INTRAMUSCULAR; INTRAVENOUS at 08:53

## 2020-09-29 RX ADMIN — LABETALOL HYDROCHLORIDE 300 MG: 200 TABLET, FILM COATED ORAL at 00:21

## 2020-09-29 RX ADMIN — CLONIDINE HYDROCHLORIDE 0.3 MG: 0.1 TABLET ORAL at 00:20

## 2020-09-29 RX ADMIN — FENTANYL CITRATE 50 MCG: 50 INJECTION INTRAMUSCULAR; INTRAVENOUS at 12:09

## 2020-09-29 ASSESSMENT — PAIN SCALES - GENERAL
PAINLEVEL_OUTOF10: 9
PAINLEVEL_OUTOF10: 0
PAINLEVEL_OUTOF10: 1
PAINLEVEL_OUTOF10: 9
PAINLEVEL_OUTOF10: 9
PAINLEVEL_OUTOF10: 0
PAINLEVEL_OUTOF10: 5
PAINLEVEL_OUTOF10: 8
PAINLEVEL_OUTOF10: 0

## 2020-09-29 ASSESSMENT — PAIN DESCRIPTION - LOCATION
LOCATION: CHEST

## 2020-09-29 ASSESSMENT — PAIN DESCRIPTION - DESCRIPTORS
DESCRIPTORS: SHARP

## 2020-09-29 ASSESSMENT — PULMONARY FUNCTION TESTS
PIF_VALUE: 1
PIF_VALUE: 11
PIF_VALUE: 12
PIF_VALUE: 4
PIF_VALUE: 11
PIF_VALUE: 1
PIF_VALUE: 1
PIF_VALUE: 13
PIF_VALUE: 12
PIF_VALUE: 8
PIF_VALUE: 8
PIF_VALUE: 13
PIF_VALUE: 12
PIF_VALUE: 8
PIF_VALUE: 8
PIF_VALUE: 1
PIF_VALUE: 11
PIF_VALUE: 12
PIF_VALUE: 11
PIF_VALUE: 8
PIF_VALUE: 13
PIF_VALUE: 12
PIF_VALUE: 13
PIF_VALUE: 7
PIF_VALUE: 1
PIF_VALUE: 10
PIF_VALUE: 13
PIF_VALUE: 13
PIF_VALUE: 1
PIF_VALUE: 1
PIF_VALUE: 13
PIF_VALUE: 7
PIF_VALUE: 15
PIF_VALUE: 8
PIF_VALUE: 12
PIF_VALUE: 5
PIF_VALUE: 1
PIF_VALUE: 11
PIF_VALUE: 11
PIF_VALUE: 12
PIF_VALUE: 4
PIF_VALUE: 13
PIF_VALUE: 1
PIF_VALUE: 13

## 2020-09-29 ASSESSMENT — PAIN DESCRIPTION - FREQUENCY
FREQUENCY: INTERMITTENT

## 2020-09-29 ASSESSMENT — PAIN - FUNCTIONAL ASSESSMENT
PAIN_FUNCTIONAL_ASSESSMENT: PREVENTS OR INTERFERES SOME ACTIVE ACTIVITIES AND ADLS
PAIN_FUNCTIONAL_ASSESSMENT: PREVENTS OR INTERFERES SOME ACTIVE ACTIVITIES AND ADLS
PAIN_FUNCTIONAL_ASSESSMENT: 0-10
PAIN_FUNCTIONAL_ASSESSMENT: PREVENTS OR INTERFERES SOME ACTIVE ACTIVITIES AND ADLS

## 2020-09-29 ASSESSMENT — PAIN DESCRIPTION - PAIN TYPE
TYPE: ACUTE PAIN

## 2020-09-29 ASSESSMENT — LIFESTYLE VARIABLES: SMOKING_STATUS: 1

## 2020-09-29 ASSESSMENT — PAIN DESCRIPTION - ORIENTATION
ORIENTATION: RIGHT
ORIENTATION: RIGHT;LEFT
ORIENTATION: MID
ORIENTATION: RIGHT;LEFT
ORIENTATION: RIGHT

## 2020-09-29 ASSESSMENT — ENCOUNTER SYMPTOMS: SHORTNESS OF BREATH: 1

## 2020-09-29 ASSESSMENT — PAIN DESCRIPTION - PROGRESSION
CLINICAL_PROGRESSION: NOT CHANGED
CLINICAL_PROGRESSION: NOT CHANGED

## 2020-09-29 ASSESSMENT — PAIN DESCRIPTION - ONSET
ONSET: ON-GOING

## 2020-09-29 NOTE — PLAN OF CARE
Problem: Skin Integrity:  Goal: Will show no infection signs and symptoms  Description: Will show no infection signs and symptoms  Outcome: Ongoing   Skin integrity improved/maintained this shift. See head to toe assessment. Problem: Pain:  Description: Pain management should include both nonpharmacologic and pharmacologic interventions. Goal: Pain level will decrease  Description: Pain level will decrease  Outcome: Ongoing   Adequate pain control achieved this shift. See MAR.

## 2020-09-29 NOTE — CARE COORDINATION
9/29 Patient is from [address]. The patient will need a negative COVID to return to the facility. No precert required. Patient will need a level of Care.  Electronically signed by Bobbette Hodgkins, RN on 9/29/2020 at 3:45 PM

## 2020-09-29 NOTE — ANESTHESIA PRE PROCEDURE
Lexie Goldstein MD        DULoxetine (CYMBALTA) extended release capsule 60 mg  60 mg Oral Daily Tim Perez MD   60 mg at 09/29/20 0843    escitalopram (LEXAPRO) tablet 10 mg  10 mg Oral Daily Tim Perez MD   10 mg at 09/29/20 0843    hydrALAZINE (APRESOLINE) tablet 25 mg  25 mg Oral TID Lexie Goldstein MD   25 mg at 09/29/20 0843    labetalol (NORMODYNE) tablet 300 mg  300 mg Oral BID Bernestine Merlin Mushrif, MD   300 mg at 09/29/20 0843    rosuvastatin (CRESTOR) tablet 5 mg  5 mg Oral Nightly Tim Perez MD   5 mg at 09/29/20 0028    sodium chloride flush 0.9 % injection 10 mL  10 mL Intravenous 2 times per day Lexie Goldstein MD   10 mL at 09/29/20 0838    sodium chloride flush 0.9 % injection 10 mL  10 mL Intravenous PRN Tim Perez MD   10 mL at 09/29/20 0020    promethazine (PHENERGAN) tablet 12.5 mg  12.5 mg Oral Q6H PRN Tim Perez MD        Or    ondansetron (ZOFRAN) injection 4 mg  4 mg Intravenous Q6H PRN Tim Perez MD        enoxaparin (LOVENOX) injection 40 mg  40 mg Subcutaneous Daily Tim Perez MD        potassium chloride 10 mEq/100 mL IVPB (Peripheral Line)  10 mEq Intravenous PRN Tim Perez MD        magnesium sulfate 2 g in 50 mL IVPB premix  2 g Intravenous PRN Tim Perez MD        dexamethasone (DECADRON) injection 10 mg  10 mg Intravenous Q12H Tim Perez MD   10 mg at 09/29/20 0853    morphine (PF) injection 4 mg  4 mg Intravenous Q4H PRN Lexie Goldstein MD   4 mg at 09/29/20 0837       Allergies:     Allergies   Allergen Reactions    Niacin And Related Anaphylaxis     Muscle ached a lot    Statins [Statins] Itching       Problem List:    Patient Active Problem List   Diagnosis Code    Type 2 diabetes mellitus with circulatory disorder ([facility] E11.59    Mixed hyperlipidemia E78.2    Essential hypertension I10    Phantom limb syndrome with pain (HCC) G54.6    Tobacco abuse Z72.0    History of substance abuse ([facility] F19.11    Chronic pain syndrome G89.4    Lumbosacral spondylosis without myelopathy M47.817    Superior vena cava compression syndrome I87.1       Past Medical History:        Diagnosis Date    Anxiety     Heart disease     Hyperlipidemia     triglycerides    Hypertension     Peripheral vascular disease, unspecified ([facility]     bilateral AKA amputation    Phantom limb (syndrome)     Type II or unspecified type diabetes mellitus without mention of complication, not stated as uncontrolled        Past Surgical History:        Procedure Laterality Date    ABOVE KNEE AMPUTATION  2009,2010    both    DENTAL SURGERY  4-14       Social History:    Social History     Tobacco Use    Smoking status: Current Every Day Smoker     Packs/day: 2.00     Years: 40.00     Pack years: 80.00     Types: Cigarettes    Smokeless tobacco: Never Used   Substance Use Topics    Alcohol use: Not Currently     Alcohol/week: 1.0 - 2.0 standard drinks     Types: 1 - 2 Shots of liquor per week                                Ready to quit: Not Answered  Counseling given: Not Answered      Vital Signs (Current):   Vitals:    09/28/20 2357 09/29/20 0347 09/29/20 0842 09/29/20 0858   BP:  (!) 142/77 (!) 159/80    Pulse:  76 91    Resp: 20 20 20    Temp:  97.6 °F (36.4 °C) 97.9 °F (36.6 °C)    TempSrc:  Oral Oral    SpO2: 91% 94% 97% 96%   Weight:  180 lb 5.4 oz (81.8 kg)     Height:                                                  BP Readings from Last 3 Encounters:   09/29/20 (!) 159/80   03/01/20 (!) 119/52   02/13/20 (!) 200/120       NPO Status: Time of last liquid consumption: 0900(sip with meds)                        Time of last solid consumption: 1800                        Date of last liquid consumption: 09/29/20                        Date of last solid food consumption: 09/28/20    BMI:   Wt Readings from Last 3 Encounters:   09/29/20 180 lb 5.4 oz (81.8 kg)   02/29/20 194 lb 0.1 oz (88 kg)   01/06/19 190 lb 7.6 oz (86.4 kg)     Body mass index is 24.46 kg/m². CBC:   Lab Results   Component Value Date    WBC 10.2 09/28/2020    RBC 4.22 09/28/2020    HGB 12.8 09/28/2020    HCT 37.5 09/28/2020    MCV 88.9 09/28/2020    RDW 14.0 09/28/2020     09/28/2020       CMP:   Lab Results   Component Value Date     09/28/2020    K 4.2 09/28/2020    K 3.2 02/29/2020    CL 93 09/28/2020    CO2 26 09/28/2020    BUN 5 09/28/2020    CREATININE 0.6 09/28/2020    GFRAA >60 09/28/2020    AGRATIO 2.0 09/28/2020    LABGLOM >60 09/28/2020    GLUCOSE 98 09/28/2020    PROT 6.5 09/28/2020    CALCIUM 9.2 09/28/2020    BILITOT 0.3 09/28/2020    ALKPHOS 127 09/28/2020    AST 12 09/28/2020    ALT 12 09/28/2020       POC Tests: No results for input(s): POCGLU, POCNA, POCK, POCCL, POCBUN, POCHEMO, POCHCT in the last 72 hours. Coags:   Lab Results   Component Value Date    PROTIME 13.2 02/29/2020    INR 1.14 02/29/2020       HCG (If Applicable): No results found for: PREGTESTUR, PREGSERUM, HCG, HCGQUANT     ABGs: No results found for: PHART, PO2ART, RPD7GHD, UUY6ZMH, BEART, Y1UJDVRW     Type & Screen (If Applicable):  No results found for: LABABO, LABRH    Drug/Infectious Status (If Applicable):  No results found for: HIV, HEPCAB    COVID-19 Screening (If Applicable):   Lab Results   Component Value Date    COVID19 Not Detected 09/29/2020         Anesthesia Evaluation  Patient summary reviewed no history of anesthetic complications:   Airway: Mallampati: III  TM distance: >3 FB   Neck ROM: full  Mouth opening: > = 3 FB Dental:      Comment: No upper teeth    Pulmonary:   (+) shortness of breath:  decreased breath sounds,  current smoker    (-) COPD and asthma          Patient did not smoke on day of surgery.                 ROS comment: Right lung mass, facial swelling, concern for SVC syndrome    Cardiovascular:    (+) hypertension:, hyperlipidemia    (-) valvular problems/murmurs, past MI, CAD, CABG/stent, dysrhythmias,  angina and murmur    ECG reviewed  Rhythm: regular  Rate: normal                    Neuro/Psych:   (+) neuromuscular disease:, psychiatric history:depression/anxiety    (-) TIA and CVA           GI/Hepatic/Renal:   (+) GERD: well controlled,      (-) PUD, hepatitis, liver disease and no renal disease       Endo/Other:    (+) DiabetesType II DM, , malignancy/cancer. (-) hypothyroidism, hyperthyroidism               Abdominal:           Vascular:   + PVD, aortic or cerebral, . Anesthesia Plan      general     ASA 4       Induction: intravenous. Anesthetic plan and risks discussed with patient. Plan discussed with CRNA. This pre-anesthesia assessment may be used as a history and physical.    DOS STAFF ADDENDUM:    Pt seen and examined, chart reviewed (including anesthesia, drug and allergy history). No interval changes to history and physical examination. Anesthetic plan, risks, benefits, alternatives, and personnel involved discussed with patient. Patient verbalized an understanding and agrees to proceed.       Isaak Ward MD  September 29, 2020  11:31 AM      Isaak Ward MD   9/29/2020

## 2020-09-29 NOTE — PROGRESS NOTES
4 Eyes Skin Assessment     NAME:  Moisés Diaz  YOB: 1960  MEDICAL RECORD NUMBER:  [id number]    The patient is being assess for  Admission    I agree that 2 RN's have performed a thorough Head to Toe Skin Assessment on the patient. ALL assessment sites listed below have been assessed. Areas assessed by both nurses: Cat   Head, Face, Ears, Shoulders, Back, Chest, Arms, Elbows, Hands, Sacrum. Buttock, Coccyx, Ischium and Legs. Feet and Heels        Does the Patient have a Wound?  No noted wound(s)       Brayan Prevention initiated:  No   Wound Care Orders initiated:  No    Pressure Injury (Stage 3,4, Unstageable, DTI, NWPT, and Complex wounds) if present place consult order under [de-identified] No    New and Established Ostomies if present place consult order under : No      Nurse 1 eSignature: Electronically signed by Zaria Augustine RN on 9/29/20 at 12:56 AM EDT    **SHARE this note so that the co-signing nurse is able to place an eSignature**    Nurse 2 eSignature: Electronically signed by Christin Gavin RN on 9/30/20 at 2:43 AM EDT

## 2020-09-29 NOTE — PROGRESS NOTES
Awake and oriented, vital signs stable,IV Infusing without complications, no c/o at this time. Post Chemo Documentation    Patient is here for treatment today, Cycle 11, Day 1.   Imfinzi    Arrives Ambulating independently                                   Modifications in dose or schedule:No       Verbalizes complaints none - slight fatigue; denies concerns  Reviewed symptoms to report to doctor and phone number to call  Provided to patient on AVS  Discharged to Home     Discharged Ambulating independently

## 2020-09-29 NOTE — PROCEDURES
PROCEDURE: Fiberoptic bronchoscopy with endobronchial ultrasound-guided biopsy of lymph nodes    Diagnosis: lung mass    Estimated Blood Loss: less than 50     DESCRIPTION OF PROCEDURE: Informed consent was obtained from the patient after explaining the risks and benefits. A time out was taken. Total intravenous anesthesia was kindly provided by the anesthetist.     The scope was passed with ease via the LMA. Bronchoscopy followed by Bronchoscopy with endobronchial ultrasound (EBUS) guided biopsy  was completed. Bronchoscopy  Trachea: Normal mucosa  Delmy: Blunted  Right main bronchus: proximal end of the RMB with abnormal mucus and slight anterior compression. Right upper lobe bronchus including anterior, apical and posterior until the fourth generation bronchi: Normal mucosa  Right intermedius bronchus: Normal mucosa  Right lower lobe basilar segments uptill  4th generation  bronchii: Normal mucosa  Right lower lobe superior segment uptill 3rd generation bronchi: Normal mucosa  Left main bronchus: Normal mucosa  Left upper lobe including Lingula, anterior segment, and apico-posterior segment through the 4th generation bronchi: Normal mucosa  Left lower lobe basilar and superior segments uptill 4th generation bronchi: Normal mucosa    Bronchoscopy with endobronchial ultrasound (EBUS) guided biopsy  Biopsy complete:  4R x 2 passes with abnormal pathology but poor yield. Moved to 11R x 2 passes with mildly improved yield but still no clear pathology. Then moved to 7 from left main stem x 5 passes with improving yield. Finally returned to 11R for 2 addition passess. At the end of the procedure, pathology believed they had enough yield for the diagnosis. To increase yield, material from 11R and 7 were combined. Mild bleeding noted but stopped with time / suctioning. Specimens:    1. EBUS biopsy of 4R, 11R and 7. The patient tolerated the procedure well.  Recovery will be per the anesthesia team.

## 2020-09-29 NOTE — PROGRESS NOTES
Pt back in room from PACU. Vitals stable. Given PRN morphine for pain. Tolerating lunch tray. Spoke with CT, patient to be NPO at 2:30 and start PO contrast around 5:30, and have CT scan around 6:30.

## 2020-09-29 NOTE — PROGRESS NOTES
Arrives to PACU, vital signs stable, VI infusing without complications, no response to voice, OPA in place, respirations easy and even.

## 2020-09-29 NOTE — H&P
[address] [address]                              HISTORY AND PHYSICAL    PATIENT NAME: Ramon Torres                      :        1960  MED REC NO:   [id number]                          ROOM:       4129  ACCOUNT NO:   [de-identified]                           ADMIT DATE: 2020  PROVIDER:     Silvana Preciado MD    I obtained the history and performed the physical exam on the patient in  the emergency room on 2020. CHIEF COMPLAINT:  Back pain, chest pain, coughing, and shortness of  breath. HISTORY OF PRESENT ILLNESS:  The patient is a 68-year-old  male  who presents to the hospital with chief complaints of two to three-day  history of subacute onset of gradually progressive increasing  right-sided anterior chest pain and back pain along with some chest pain  and shortness of breath that the patient states was more when he was  lying down, not associated with nausea or vomiting, fevers or chills. The patient notes that his back pain and chest pain just do not seemed  to be going away and is more when he takes a deep breath. No hemoptysis  or hematemesis. No melena or hematochezia. No other constitutional  symptoms. PAST MEDICAL/ PAST SURGICAL HISTORY:  1. Severe peripheral vascular disease, status post bilateral above-knee  amputations. 2.  Type 2 diabetes. 3.  Hypertension. 4.  Dyslipidemia. PAST SURGICAL HISTORY:  Bilateral _____ knee amputations. ALLERGIC HISTORY:  The patient is allergic to _____ and STATINS. FAMILY HISTORY:  Reviewed by me and is currently noncontributory. SOCIAL HISTORY:  Tobacco dependence. MEDICATIONS:  Benadryl, Apresoline, Micro-K, Normodyne, Catapres,  aspirin, Prilosec, Crestor, Cymbalta, Lexapro.     REVIEW OF SYSTEMS:  The patient's review of systems is significant for  the chest pain and the back pain and per the history of present illness. All other systems have been reviewed and they are negative except per  the history of present illness. PHYSICAL EXAMINATION:  The patient was examined by me in the emergency  room. VITAL SIGNS:  Temperature 97.7, respiratory rate 25, pulse 82, blood  pressure initially 200/98, saturating 99%. CNS:  Alert, awake, and oriented. PSYCH:  The patient is cooperative, answering questions appropriately. EYES:  Pupils are bilaterally equal.  ENT:  No oral mucosal lesions. The patient's face appears extremely  flushed. The patient has got significantly distended external jugular  veins especially on the right side. CVS:  S1 and S2 are heard. No murmurs or rubs. RESPIRATORY SYSTEM:  The patient has got significantly decreased air  entry over the right middle lung. ABDOMEN:  Soft. No guarding, rigidity, or rebound. MUSCULOSKELETAL EXAM:  The patient has got bilateral lower extremity  above-knee amputation stumps. Thorough palpation of the spine does not  reveal any evidence of tenderness on the spine. SKIN:  The patient has got no cutaneous rashes or lesions. DIAGNOSTIC DATA:  CT chest PE protocol shows large 8.3 cm right  suprahilar upper lobe pulmonic mass with compression of the hilar and  paratracheal nodes and narrowing of the mid SVC segment significantly  with evidence of some degree of SVC obstruction. Chest x-ray shows left  pleural effusion and new right suprahilar mass. BUN 5, creatinine 0.6. Sodium 131, potassium 4.2. CBC showed white  count 10.2 with a hemoglobin of 12.8, platelets of 554. CONSULTATIONS:  I have requested a consultation to Pulmonology and ER  has requested a consultation to Hematology/Oncology at my request.    ASSESSMENT:  1. Superior vena cava syndrome with obstruction secondary to new  diagnosis of right-sided locally advanced bronchogenic cancer, pending  tissue sampling. 2.  Severe peripheral vascular disease. 3.  Hypertension. 4.  Dyslipidemia.   5. Tobacco dependence. PLAN OF CARE:  The patient admitted to the Internal Medicine Service. IV Decadron has been initiated. Supplemental oxygen will be continued  as necessary. Pulmonology consult has been requested. I believe that  the patient's bronchogenic cancer maybe amenable to bronchoscopic  biopsy. Hematology/Oncology consult has been requested. We will watch  the patient for any signs of further SVC obstruction which may  necessitate urgent/emergent radiotherapy. Further management will be  per specialist's recommendations. DVT prophylaxis with Lovenox. IV  morphine will be used for pain control. CODE STATUS:  Full. EXPECTED LENGTH OF STAY:  More than two midnights based on the plan of  care above. RISK:  High due to patient's presentation with the SVC obstruction. DISPOSITION:  Admitted to the Internal Medicine Service.         Phuc Jones MD    D: 09/29/2020 0:56:53       T: 09/29/2020 3:27:23     [email or web address]  Job#: [id number]     Doc#: [id number]    CC:

## 2020-09-29 NOTE — ED NOTES
Report called to nurse Rosemary Hooker at this time. VS as noted. Questions/concerns addressed. Transport requested at this time.       Luz Elena Oneal RN  09/28/20 [id number]

## 2020-09-29 NOTE — PROGRESS NOTES
Pt has been completely alert and oriented x 4 this shift. Patient woke up from short nap very disoriented, pulled off his telemetry leads, thought he was at the nursing home and that he needed to go out to smoke. Neurological assessment negative. Patient educated that he has a nicotine patch on and cannot go outside to smoke. Patient reminded he was in the hospital and needed to stay in his room. Talked about tests today and the probable lung cancer diagnosis. Patient easily reoriented. Will continue to monitor.

## 2020-09-29 NOTE — PROGRESS NOTES
Physician Progress Note      Karla Peoples  [facility] #:                  [id number]  :                       1960  ADMIT DATE:       2020 4:45 PM  [address] [place] DATE:  RESPONDING  PROVIDER #:        Lennox Savers MD          QUERY TEXT:    Dear Dr Sterling Le,  Patient admitted with R sided pulmonic mass, noted to have trace bilateral   pleural effusions on CT scan. If possible, please document in progress notes   and discharge summary if you are evaluating and/or treating any of the   following: The medical record reflects the following:  Risk Factors: Hx bilat AKA, PVD, HTN, HLD, Heart disease  Clinical Indicators: CT shows \"Large 8.3 cm right suprahilar/upper lobe   pulmonic mass. ..consistent  with bronchogenic carcinoma. \" also notes \"Trace bilateral pleural effusions\",   CXR notes Left pleural effusion  Treatment: Pulm consult, Bronch with biopsy, [facility] consult  Thank you,  Cecile Sarmiento RN, [email or web address]  Options provided:  -- Malignant pleural effusion due to R lung cancer  -- Bilateral pleural effusions  -- Pleural effusions not clinically significant  -- Empyema/Bacterial pleural effusion  -- Pleural effusion due to CHF  -- Other - I will add my own diagnosis  -- Disagree - Not applicable / Not valid  -- Disagree - Clinically unable to determine / Unknown  -- Refer to Clinical Documentation Reviewer    PROVIDER RESPONSE TEXT:    Unable to determine at this point    Query created by:  Michael Hernandes on 2020 11:16 AM      Electronically signed by:  Lennox Savers MD 2020 11:21 AM

## 2020-09-29 NOTE — PROGRESS NOTES
Admitted 62 y.o male to room 4129 from the emergency room. A&O, rated pain approprietly. Oriented to room, call light, TV, and phone. Patient able to use call light to express needs. Bed to lowest position with door open and call light in reach. All fall precautions implemented at this time.  Will continue to monitor

## 2020-09-29 NOTE — CONSULTS
REASON FOR CONSULTATION/CC: Chest mass      Consult at request of Abiel Nowak MD for lung mass  PCP: Elina Murrieta MD  Established Pulmonologist:  None    HISTORY OF PRESENT ILLNESS: Leobardo Vaughan is a 62y.o. year old male with a history of peripheral vascular disease who presents with pain and cough with swelling in the face. Chest x-ray completed demonstrating possible mass. CT chest confirmed this and demonstrating large right mediastinal mass. Oncology consulted requesting biopsy today with concern for small cell. MRI brain has been ordered. PAST MEDICAL HISTORY:  Past Medical History:   Diagnosis Date    Anxiety     Heart disease     Hyperlipidemia     triglycerides    Hypertension     Peripheral vascular disease, unspecified ([facility] [address]     bilateral AKA amputation    Phantom limb (syndrome)     Type II or unspecified type diabetes mellitus without mention of complication, not stated as uncontrolled        PAST SURGICAL HISTORY:  Past Surgical History:   Procedure Laterality Date    ABOVE KNEE AMPUTATION  2009,2010    both    DENTAL SURGERY  4-14       FAMILY HISTORY:  family history includes Arthritis in his mother; Cancer in his father and sister; Diabetes in his father and sister; Heart Disease in his father and mother; High Blood Pressure in his brother, father, mother, and sister; High Cholesterol in his father and mother. SOCIAL HISTORY:   reports that he has been smoking cigarettes. He has a 80.00 pack-year smoking history.  He has never used smokeless tobacco.    Scheduled Meds:   cloNIDine  0.3 mg Oral TID    aspirin  325 mg Oral Daily    DULoxetine  60 mg Oral Daily    escitalopram  10 mg Oral Daily    hydrALAZINE  25 mg Oral TID    labetalol  300 mg Oral BID    rosuvastatin  5 mg Oral Nightly    sodium chloride flush  10 mL Intravenous 2 times per day    enoxaparin  40 mg Subcutaneous Daily    dexamethasone  10 mg Intravenous Q12H       Continuous Infusions:      PRN Meds:  sodium chloride flush, promethazine **OR** ondansetron, potassium chloride, magnesium sulfate, morphine    ALLERGIES:  Patient is allergic to niacin and related and statins [statins]. REVIEW OF SYSTEMS:  Constitutional: Negative for fever   HENT: Negative for sore throat  Eyes: Negative for redness   Respiratory: Negative for dyspnea,+  cough  Cardiovascular: Negative for chest pain  Gastrointestinal: Negative for vomiting, diarrhea   Genitourinary: Negative for hematuria   Musculoskeletal: Negative for arthralgias   Skin: Negative for rash  Neurological: Negative for syncope  Hematological: Negative for adenopathy  Psychiatric/Behavorial: Negative for anxiety    Objective:   PHYSICAL EXAM:  Blood pressure (!) 159/80, pulse 91, temperature 97.9 °F (36.6 °C), temperature source Oral, resp. rate 20, height 6' (1.829 m), weight 180 lb 5.4 oz (81.8 kg), SpO2 96 %.'  Gen: No distress. Mild swelling of the face  Eyes: PERRL. No sclera icterus. No conjunctival injection. ENT: No discharge. Pharynx clear. External appearance of ears and nose normal.  Neck: Trachea midline. No obvious mass. Resp: No accessory muscle use. No crackles. No wheezes. No rhonchi. CV: Regular rate. Regular rhythm. No murmur or rub. No edema. GI: Non-tender. Non-distended. No hernia. Skin: Warm, dry, normal texture and turgor. No nodule on exposed extremities. Lymph: No cervical LAD. No supraclavicular LAD. M/S: No cyanosis. No clubbing. No joint deformity. Bilateral above-the-knee amputation  Neuro: Moves all four extremities. Psych: Oriented x 3. No anxiety. Awake. Alert. Intact judgement and insight.       Data Reviewed:   LABS:  CBC:   Recent Labs     09/28/20  1734   WBC 10.2   HGB 12.8*   HCT 37.5*   MCV 88.9        BMP:   Recent Labs     09/28/20  1734   *   K 4.2   CL 93*   CO2 26   BUN 5*   CREATININE 0.6*     LIVER PROFILE:   Recent Labs     09/28/20  1734   AST 12*   ALT 12 and narrows the main right pulmonary artery. This also narrows the  distal right mainstem bronchus. Exact measurement is difficult due to its  configuration and confluent nature but it measures approximately 8.3 x 6.7 cm  (series 2, image 187). Diffuse subcutaneous fat stranding of the mediastinum  is felt to be related to some degree SVC syndrome/obstruction from the  aforementioned mass. The heart and pericardium demonstrate no acute abnormality. There is no  acute abnormality of the thoracic aorta. Lungs/pleura: Conglomerate left right upper/suprahilar pulmonic mass which is  confluent with the large right hilar and pretracheal lymphadenopathy. There  is postobstructive atelectasis of the right lung apex with interlobular  septal thickening. Trace bilateral pleural effusions noted. Upper Abdomen: Subcentimeter low densities in the liver noted. No calcified  gallstones. The adrenal glands are unremarkable. Moderate-severe atherosclerotic aortic disease which affects the origins of  the great vessels and visceral vasculature. Soft Tissues/Bones: No acute bone or soft tissue abnormality. Impression No evidence of pulmonary embolism. Large 8.3 cm right suprahilar/upper lobe pulmonic mass which is confluent  with enlarged right hilar and pretracheal lymph nodes. This is consistent  with bronchogenic carcinoma. The mass significantly narrows the traversing  mid SVC segment and the presence of fat infiltration of the mediastinum could  indicate some degree of SVC syndrome/obstruction. Trace bilateral pleural effusions.          CXR PA/LAT:   Results for orders placed during the hospital encounter of 06/21/18   XR CHEST STANDARD (2 VW)    Narrative EXAMINATION:  TWO VIEWS OF THE CHEST    6/21/2018 11:32 am    COMPARISON:  Radiograph 05/21/2009    HISTORY:  ORDERING PHYSICIAN PROVIDED HISTORY: COPD with acute exacerbation [facility])  TECHNOLOGIST PROVIDED HISTORY:  Technologist Provided Reason for Exam: sob  Acuity: Acute  Type of Encounter: Initial  Relevant Medical/Surgical History: current every day smoker    FINDINGS:  Cardiomediastinal silhouette is within normal limits. No pneumothorax or  effusion. Consolidative opacities extending over from the left hilum. Could  also visualized on lateral view overlying the mid thoracic spine. Impression Left-sided perihilar airspace disease could correspond to partially imaged  opacities on prior abdominal CT. These may be infectious/inflammatory. Recommend follow-up imaging to resolution or dedicated chest CT for further  evaluation as underlying mass remains a possibility. The findings were sent to the Radiology Results [address] at 11:52  am on 6/21/2018to be communicated to a licensed caregiver. CXR portable:   Results for orders placed during the hospital encounter of 09/28/20   XR CHEST PORTABLE    Narrative EXAMINATION:  ONE XRAY VIEW OF THE CHEST    9/28/2020 5:35 pm    COMPARISON:  02/29/2020    HISTORY:  ORDERING SYSTEM PROVIDED HISTORY: Chest pain  TECHNOLOGIST PROVIDED HISTORY:  Reason for exam:->Chest pain  Reason for Exam: Chest Pain  Acuity: Chronic  Type of Exam: Ongoing    FINDINGS:  Heart size and pulmonary vessels within normal limits. Interval development  of right hilar/suprahilar mass. Right lung clear with a sharp costophrenic  angle. Blunting of the left costophrenic angle      Impression 1. New right hilar/suprahilar mass. Recommend CT chest  2. Left pleural effusion             Assessment:     Chest mass  Possible SVC syndrome  Tobacco abuse    Plan:        -Agree with need for biopsy. The EBUS currently planned for 3:00 today. Currently n.p.o.  -We will discuss with pathology the need for quick turnaround.  -We will discuss preliminary results with Dr. Terra Garcia and benefits discussed with patient. He acknowledged the risk of significant bleeding, lung collapse and death.        This note was transcribed using [id number] [address]. Please disregard any translational errors.     Thank you for the consult    Trino Alvarez Pul[facility]  [phone]

## 2020-09-29 NOTE — CONSULTS
[facility]    Consult Note      Requesting Physician: reinaldo   CHIEF COMPLAINT: pain right side of chest       HISTORY OF PRESENT ILLNESS:      Mr. Shirlene Hines  is a 62 y.o. male we are seeing in consultation for  A new lung mass that was found upon eval in the ed for pain and cough.  Pt denies hemoptysis   He notes facial swelling  NO weight loss  apetite is ok   HE has not had headaches or one sided weakness or vision changes  NO nv   NO bone pain in the back  Upon eval he was found to have a large mass in his rul near the mediastinum casuing near svc syndrome  The mass is 8cm  He currently cannot lay flat  He has not been on oxygen before  He lives in a long term care facility-no family no children     Past Medical History:        Diagnosis Date    Anxiety     Heart disease     Hyperlipidemia     triglycerides    Hypertension     Peripheral vascular disease, unspecified (HCC)     bilateral AKA amputation    Phantom limb (syndrome)     Type II or unspecified type diabetes mellitus without mention of complication, not stated as uncontrolled      Past Surgical History:        Procedure Laterality Date    ABOVE KNEE AMPUTATION  2009,2010    both    DENTAL SURGERY  4-14       Current Medications:    Current Facility-Administered Medications: cloNIDine (CATAPRES) tablet 0.3 mg, 0.3 mg, Oral, TID  aspirin tablet 325 mg, 325 mg, Oral, Daily  DULoxetine (CYMBALTA) extended release capsule 60 mg, 60 mg, Oral, Daily  escitalopram (LEXAPRO) tablet 10 mg, 10 mg, Oral, Daily  hydrALAZINE (APRESOLINE) tablet 25 mg, 25 mg, Oral, TID  labetalol (NORMODYNE) tablet 300 mg, 300 mg, Oral, BID  rosuvastatin (CRESTOR) tablet 5 mg, 5 mg, Oral, Nightly  sodium chloride flush 0.9 % injection 10 mL, 10 mL, Intravenous, 2 times per day  sodium chloride flush 0.9 % injection 10 mL, 10 mL, Intravenous, PRN  promethazine (PHENERGAN) tablet 12.5 mg, 12.5 mg, Oral, Q6H PRN **OR** ondansetron (ZOFRAN) injection 4 mg, 4 mg, Intravenous, Q6H PRN  enoxaparin (LOVENOX) injection 40 mg, 40 mg, Subcutaneous, Daily  potassium chloride 10 mEq/100 mL IVPB (Peripheral Line), 10 mEq, Intravenous, PRN  magnesium sulfate 2 g in 50 mL IVPB premix, 2 g, Intravenous, PRN  dexamethasone (DECADRON) injection 10 mg, 10 mg, Intravenous, Q12H  morphine (PF) injection 4 mg, 4 mg, Intravenous, Q4H PRN  Allergies:  Niacin and related and Statins [statins]    Social History:      Social History     Socioeconomic History    Marital status: Single     Spouse name: Not on file    Number of children: Not on file    Years of education: Not on file    Highest education level: Not on file   Occupational History    Occupation: Disabled     Comment: 2007   Social Needs    Financial resource strain: Not on file    Food insecurity     Worry: Not on file     Inability: Not on file   Italian TRAFI needs     Medical: Not on file     Non-medical: Not on file   Tobacco Use    Smoking status: Current Every Day Smoker     Packs/day: 2.00     Years: 40.00     Pack years: 80.00     Types: Cigarettes    Smokeless tobacco: Never Used   Substance and Sexual Activity    Alcohol use: Not Currently     Alcohol/week: 1.0 - 2.0 standard drinks     Types: 1 - 2 Shots of liquor per week    Drug use: Yes     Frequency: 1.0 times per week     Types: Marijuana     Comment: 2 x week    Sexual activity: Never   Lifestyle    Physical activity     Days per week: Not on file     Minutes per session: Not on file    Stress: Not on file   Relationships    Social connections     Talks on phone: Not on file     Gets together: Not on file     Attends Zoroastrianism service: Not on file     Active member of club or organization: Not on file     Attends meetings of clubs or organizations: Not on file     Relationship status: Not on file    Intimate partner violence     Fear of current or ex partner: Not on file     Emotionally abused: Not on file     Physically abused: Not on file     Forced sexual activity: Not on file   Other Topics Concern    Not on file   Social History Narrative    Not on file          Family History:         Problem Relation Age of Onset    Heart Disease Mother     High Cholesterol Mother     High Blood Pressure Mother     Arthritis Mother     Heart Disease Father     Cancer Father         lung    Diabetes Father     High Cholesterol Father     High Blood Pressure Father     Cancer Sister     High Blood Pressure Sister     Diabetes Sister     High Blood Pressure Brother      REVIEW OF SYSTEMS:    ROS per the HPI   Otherwise  10 point ROS negative     PHYSICAL EXAM:      Vitals:  BP (!) 142/77   Pulse 76   Temp 97.6 °F (36.4 °C) (Oral)   Resp 20   Ht 6' (1.829 m) Comment: prior to bilateral amputations-does not know current height  Wt 180 lb 5.4 oz (81.8 kg)   SpO2 94%   BMI 24.46 kg/m²     CONSTITUTIONAL:  awake, alert, cooperative, mild distress  Mild facial swellin   EYES:  pupils equal, round and reactive to light, extra ocular muscles intact, sclera clear, conjunctiva normal  NECK:  Supple, symmetrical, trachea midline, no adenopathy, thyroid symmetric, not enlarged and no tenderness, skin normal  HEMATOLOGIC/LYMPHATICS:  no cervical lymphadenopathy, no supraclavicular lymphadenopathy, LUNGS: diminished   CARDIOVASCULAR:  , regular rate and rhythm, normal S1 and S2, no S3 or S4, and no murmur noted  ABDOMEN:  No scars, normal bowel sounds, soft, non-distended, non-tender, no masses palpated, no hepatosplenomegally      MUSCULOSKELETAL:  Bilateral amputation   NEUROLOGIC:  Awake, alert, oriented to name, place and time. Cranial nerves II-XII are grossly intact. Benedetta Ou SKIN:  no bruising or bleeding      DATA:    PT/INR:    Recent Labs     09/28/20  1734   PROT 6.5     PTT:  No results for input(s): APTT in the last 72 hours.   CMP:    Lab Results   Component Value Date     09/28/2020    K 4.2 09/28/2020    K 3.2 02/29/2020    CL 93 09/28/2020    CO2 26 09/28/2020    BUN 5 09/28/2020    PROT 6.5 09/28/2020     Magnesium:    Lab Results   Component Value Date    MG 2.20 02/29/2020     Phosphorus:  No components found for: PO4  Calcium:  No components found for: CA  CBC:    Lab Results   Component Value Date    WBC 10.2 09/28/2020    RBC 4.22 09/28/2020    HGB 12.8 09/28/2020    HCT 37.5 09/28/2020    MCV 88.9 09/28/2020    RDW 14.0 09/28/2020     09/28/2020     DIFF:    Lab Results   Component Value Date    MCV 88.9 09/28/2020    RDW 14.0 09/28/2020      LDH:  @labcrnt(LDH)@  Uric Acid:  @labcrnt(URIC)@    Radiology Review: Xr Chest Portable    Result Date: 9/28/2020  EXAMINATION: ONE XRAY VIEW OF THE CHEST 9/28/2020 5:35 pm COMPARISON: 02/29/2020 HISTORY: ORDERING SYSTEM PROVIDED HISTORY: Chest pain TECHNOLOGIST PROVIDED HISTORY: Reason for exam:->Chest pain Reason for Exam: Chest Pain Acuity: Chronic Type of Exam: Ongoing FINDINGS: Heart size and pulmonary vessels within normal limits. Interval development of right hilar/suprahilar mass. Right lung clear with a sharp costophrenic angle. Blunting of the left costophrenic angle     1. New right hilar/suprahilar mass. Recommend CT chest 2. Left pleural effusion     Ct Chest Pulmonary Embolism W Contrast    Result Date: 9/28/2020  EXAMINATION: CTA OF THE CHEST 9/28/2020 6:38 pm TECHNIQUE: CTA of the chest was performed after the administration of intravenous contrast.  Multiplanar reformatted images are provided for review. MIP images are provided for review. Dose modulation, iterative reconstruction, and/or weight based adjustment of the mA/kV was utilized to reduce the radiation dose to as low as reasonably achievable.  COMPARISON: Same date chest radiograph HISTORY: ORDERING SYSTEM PROVIDED HISTORY: SOB suspect pulmonary embolus TECHNOLOGIST PROVIDED HISTORY: Reason for exam:->SOB suspect pulmonary embolus Reason for Exam: SOB suspect pulmonary embolus Acuity: Acute Type of Exam: Initial FINDINGS: Pulmonary Arteries: Pulmonary arteries are adequately opacified for evaluation. No evidence of intraluminal filling defect to suggest pulmonary embolism. Main pulmonary artery is normal in caliber. Mediastinum: CT confirms that there is right suprahilar conglomerate mian/pulmonic mass which results in obliteration of the traversing SVC segment and narrows the main right pulmonary artery. This also narrows the distal right mainstem bronchus. Exact measurement is difficult due to its configuration and confluent nature but it measures approximately 8.3 x 6.7 cm (series 2, image 187). Diffuse subcutaneous fat stranding of the mediastinum is felt to be related to some degree SVC syndrome/obstruction from the aforementioned mass. The heart and pericardium demonstrate no acute abnormality. There is no acute abnormality of the thoracic aorta. Lungs/pleura: Conglomerate left right upper/suprahilar pulmonic mass which is confluent with the large right hilar and pretracheal lymphadenopathy. There is postobstructive atelectasis of the right lung apex with interlobular septal thickening. Trace bilateral pleural effusions noted. Upper Abdomen: Subcentimeter low densities in the liver noted. No calcified gallstones. The adrenal glands are unremarkable. Moderate-severe atherosclerotic aortic disease which affects the origins of the great vessels and visceral vasculature. Soft Tissues/Bones: No acute bone or soft tissue abnormality. No evidence of pulmonary embolism. Large 8.3 cm right suprahilar/upper lobe pulmonic mass which is confluent with enlarged right hilar and pretracheal lymph nodes. This is consistent with bronchogenic carcinoma. The mass significantly narrows the traversing mid SVC segment and the presence of fat infiltration of the mediastinum could indicate some degree of SVC syndrome/obstruction. Trace bilateral pleural effusions.          Problem List  Patient Active Problem List Diagnosis    Type 2 diabetes mellitus with circulatory disorder (HCC)    Mixed hyperlipidemia    Essential hypertension    Phantom limb syndrome with pain (HCC)    Tobacco abuse    History of substance abuse ([facility] [address]    Chronic pain syndrome    Lumbosacral spondylosis without myelopathy    Superior vena cava compression syndrome       IMPRESSION/RECOMMENDATIONS:  RUL lung mass/near svc syndrome   MRI brain will need to be done but cant lay flat for now  Bone scan and abd pelvic ct ordered  pulm consulted for biopsy  Usually with near svc we wait on path rather than start emergent xrt as if its small cell we would give chemo first   I recommend he stay inpt until we get a tissue dx -and if small cell I will treat inpt   If its non small cell I will discuss with rad onc-\if he suddently needs emergent xrt-ie if he has bad hemoptysis or non small cell histology -we will have to figure a transfer as the radiation machine at [address] is down and we have to transfer urgent cases but at this point we do not need to do this  I will await pulm eval and if small cell I would appreciate an fyi asap  He may need a port-  If we cant get an upfront brain mri we can later once it shrinks       pvd and dm are other comorbids tht contribute to more likley complications related to cancer tx andrew given his pvd and amputation history   Thank you for the consultation.   Will follow with you    Rocio Blackwood MD

## 2020-09-29 NOTE — PROGRESS NOTES
Hospitalist Progress Note      PCP: Marcela Arellano MD    Date of Admission: 9/28/2020    Chief Complaint: Chest pain    Hospital Course: Patient is a 20-year-old  male who presented to the hospital with a 2 through the progressively worsening anterior chest pain along with back pain that is associated with shortness of breath. Patient has a history of severe peripheral vascular disease status post bilateral above-the-knee amputations, type 2 diabetes, hypertension and dyslipidemia. In the emergency department a chest x-ray was performed which showed a possible right hilar mass. CT of the chest was then performed which showed a large 8.3 cm right suprahilar upper lobe pulmonary mass with compression of the hilar and paratracheal nodes and narrowing of the mid SVC segment with evidence of some degree of SVC obstruction. There is also concern for a right-sided locally advanced bronchogenic cancer. Pulmonology was consulted and will likely need bronchoscopy with biopsy. Hematology oncology also consulted. Subjective: Patient seen and examined. Patient without complaints. Patient awaiting bronchoscopy later on today.       Medications:  Reviewed    Infusion Medications   Scheduled Medications    cloNIDine  0.3 mg Oral TID    aspirin  325 mg Oral Daily    DULoxetine  60 mg Oral Daily    escitalopram  10 mg Oral Daily    hydrALAZINE  25 mg Oral TID    labetalol  300 mg Oral BID    rosuvastatin  5 mg Oral Nightly    sodium chloride flush  10 mL Intravenous 2 times per day    enoxaparin  40 mg Subcutaneous Daily    dexamethasone  10 mg Intravenous Q12H     PRN Meds: sodium chloride flush, promethazine **OR** ondansetron, potassium chloride, magnesium sulfate, morphine      Intake/Output Summary (Last 24 hours) at 9/29/2020 0852  Last data filed at 9/29/2020 0845  Gross per 24 hour   Intake 480 ml   Output 1900 ml   Net -1420 ml       Physical Exam Performed:    BP (!) 159/80   Pulse 91 Temp 97.9 °F (36.6 °C) (Oral)   Resp 20   Ht 6' (1.829 m) Comment: prior to bilateral amputations-does not know current height  Wt 180 lb 5.4 oz (81.8 kg)   SpO2 97%   BMI 24.46 kg/m²     General appearance: No apparent distress, appears stated age and cooperative. HEENT: Pupils equal, round, and reactive to light. Conjunctivae/corneas clear. Neck: Supple, with full range of motion. No jugular venous distention. Trachea midline. Respiratory:  Normal respiratory effort. Clear to auscultation, bilaterally without Rales/Wheezes/Rhonchi. Cardiovascular: Regular rate and rhythm with normal S1/S2  Abdomen: Soft, non-tender, non-distended with normal bowel sounds. Musculoskeletal: Bilateral AKA  Skin: Skin color, texture, turgor normal.  No rashes or lesions. Neurologic:  Neurovascularly intact without any focal sensory/motor deficits. Cranial nerves: II-XII intact, grossly non-focal.  Psychiatric: Alert and oriented, thought content appropriate, normal insight  Capillary Refill: Brisk,< 3 seconds   Peripheral Pulses: +2 palpable upper extremity, equal bilaterally       Labs:   Recent Labs     09/28/20  1734   WBC 10.2   HGB 12.8*   HCT 37.5*        Recent Labs     09/28/20  1734   *   K 4.2   CL 93*   CO2 26   BUN 5*   CREATININE 0.6*   CALCIUM 9.2     Recent Labs     09/28/20  1734   AST 12*   ALT 12   BILITOT 0.3   ALKPHOS 127     No results for input(s): INR in the last 72 hours. Recent Labs     09/28/20  1734   TROPONINI <0.01       Urinalysis:      Lab Results   Component Value Date    NITRU Negative 02/29/2020    WBCUA 1 02/29/2020    RBCUA 4 02/29/2020    BLOODU Negative 02/29/2020    SPECGRAV 1.016 02/29/2020    GLUCOSEU Negative 02/29/2020       Radiology:  CT CHEST PULMONARY EMBOLISM W CONTRAST   Preliminary Result   No evidence of pulmonary embolism. Large 8.3 cm right suprahilar/upper lobe pulmonic mass which is confluent   with enlarged right hilar and pretracheal lymph nodes.

## 2020-09-30 LAB
EKG ATRIAL RATE: 80 BPM
EKG DIAGNOSIS: NORMAL
EKG P AXIS: 68 DEGREES
EKG P-R INTERVAL: 172 MS
EKG Q-T INTERVAL: 394 MS
EKG QRS DURATION: 106 MS
EKG QTC CALCULATION (BAZETT): 454 MS
EKG R AXIS: 30 DEGREES
EKG T AXIS: 52 DEGREES
EKG VENTRICULAR RATE: 80 BPM
INR BLD: 1.11 (ref 0.86–1.14)
PROTHROMBIN TIME: 12.9 SEC (ref 10–13.2)

## 2020-09-30 PROCEDURE — 6360000002 HC RX W HCPCS: Performed by: INTERNAL MEDICINE

## 2020-09-30 PROCEDURE — 1200000000 HC SEMI PRIVATE

## 2020-09-30 PROCEDURE — 6370000000 HC RX 637 (ALT 250 FOR IP): Performed by: INTERNAL MEDICINE

## 2020-09-30 PROCEDURE — 85610 PROTHROMBIN TIME: CPT

## 2020-09-30 PROCEDURE — 99232 SBSQ HOSP IP/OBS MODERATE 35: CPT | Performed by: INTERNAL MEDICINE

## 2020-09-30 PROCEDURE — 2580000003 HC RX 258: Performed by: INTERNAL MEDICINE

## 2020-09-30 PROCEDURE — 93010 ELECTROCARDIOGRAM REPORT: CPT | Performed by: INTERNAL MEDICINE

## 2020-09-30 PROCEDURE — 6370000000 HC RX 637 (ALT 250 FOR IP): Performed by: NURSE PRACTITIONER

## 2020-09-30 PROCEDURE — 36415 COLL VENOUS BLD VENIPUNCTURE: CPT

## 2020-09-30 PROCEDURE — 2700000000 HC OXYGEN THERAPY PER DAY

## 2020-09-30 PROCEDURE — 94761 N-INVAS EAR/PLS OXIMETRY MLT: CPT

## 2020-09-30 RX ORDER — OXYCODONE HCL 10 MG/1
10 TABLET, FILM COATED, EXTENDED RELEASE ORAL EVERY 12 HOURS SCHEDULED
Status: DISCONTINUED | OUTPATIENT
Start: 2020-09-30 | End: 2020-10-04 | Stop reason: HOSPADM

## 2020-09-30 RX ORDER — LORAZEPAM 1 MG/1
1 TABLET ORAL ONCE
Status: COMPLETED | OUTPATIENT
Start: 2020-09-30 | End: 2020-09-30

## 2020-09-30 RX ADMIN — SODIUM CHLORIDE, PRESERVATIVE FREE 10 ML: 5 INJECTION INTRAVENOUS at 08:26

## 2020-09-30 RX ADMIN — OXYCODONE HYDROCHLORIDE 10 MG: 10 TABLET, FILM COATED, EXTENDED RELEASE ORAL at 22:00

## 2020-09-30 RX ADMIN — HYDRALAZINE HYDROCHLORIDE 25 MG: 25 TABLET, FILM COATED ORAL at 08:22

## 2020-09-30 RX ADMIN — SODIUM CHLORIDE, PRESERVATIVE FREE 10 ML: 5 INJECTION INTRAVENOUS at 21:08

## 2020-09-30 RX ADMIN — MORPHINE SULFATE 4 MG: 4 INJECTION, SOLUTION INTRAMUSCULAR; INTRAVENOUS at 01:25

## 2020-09-30 RX ADMIN — MORPHINE SULFATE 4 MG: 4 INJECTION, SOLUTION INTRAMUSCULAR; INTRAVENOUS at 11:58

## 2020-09-30 RX ADMIN — DULOXETINE HYDROCHLORIDE 60 MG: 60 CAPSULE, DELAYED RELEASE ORAL at 08:22

## 2020-09-30 RX ADMIN — HYDRALAZINE HYDROCHLORIDE 25 MG: 25 TABLET, FILM COATED ORAL at 14:25

## 2020-09-30 RX ADMIN — MORPHINE SULFATE 4 MG: 4 INJECTION, SOLUTION INTRAMUSCULAR; INTRAVENOUS at 06:22

## 2020-09-30 RX ADMIN — MORPHINE SULFATE 4 MG: 4 INJECTION, SOLUTION INTRAMUSCULAR; INTRAVENOUS at 21:08

## 2020-09-30 RX ADMIN — HYDRALAZINE HYDROCHLORIDE 25 MG: 25 TABLET, FILM COATED ORAL at 22:01

## 2020-09-30 RX ADMIN — LABETALOL HYDROCHLORIDE 300 MG: 200 TABLET, FILM COATED ORAL at 22:01

## 2020-09-30 RX ADMIN — MORPHINE SULFATE 4 MG: 4 INJECTION, SOLUTION INTRAMUSCULAR; INTRAVENOUS at 16:34

## 2020-09-30 RX ADMIN — CLONIDINE HYDROCHLORIDE 0.3 MG: 0.1 TABLET ORAL at 14:25

## 2020-09-30 RX ADMIN — ENOXAPARIN SODIUM 40 MG: 40 INJECTION SUBCUTANEOUS at 08:22

## 2020-09-30 RX ADMIN — DEXAMETHASONE SODIUM PHOSPHATE 10 MG: 10 INJECTION INTRAMUSCULAR; INTRAVENOUS at 09:21

## 2020-09-30 RX ADMIN — ESCITALOPRAM OXALATE 10 MG: 10 TABLET ORAL at 08:22

## 2020-09-30 RX ADMIN — ASPIRIN 325 MG ORAL TABLET 325 MG: 325 PILL ORAL at 08:22

## 2020-09-30 RX ADMIN — MORPHINE SULFATE 4 MG: 4 INJECTION, SOLUTION INTRAMUSCULAR; INTRAVENOUS at 18:31

## 2020-09-30 RX ADMIN — CLONIDINE HYDROCHLORIDE 0.3 MG: 0.1 TABLET ORAL at 08:21

## 2020-09-30 RX ADMIN — MORPHINE SULFATE 4 MG: 4 INJECTION, SOLUTION INTRAMUSCULAR; INTRAVENOUS at 03:40

## 2020-09-30 RX ADMIN — MORPHINE SULFATE 4 MG: 4 INJECTION, SOLUTION INTRAMUSCULAR; INTRAVENOUS at 14:26

## 2020-09-30 RX ADMIN — MORPHINE SULFATE 4 MG: 4 INJECTION, SOLUTION INTRAMUSCULAR; INTRAVENOUS at 08:22

## 2020-09-30 RX ADMIN — ROSUVASTATIN CALCIUM 5 MG: 10 TABLET, FILM COATED ORAL at 22:00

## 2020-09-30 RX ADMIN — CLONIDINE HYDROCHLORIDE 0.3 MG: 0.1 TABLET ORAL at 22:00

## 2020-09-30 RX ADMIN — LORAZEPAM 1 MG: 1 TABLET ORAL at 22:24

## 2020-09-30 RX ADMIN — LABETALOL HYDROCHLORIDE 300 MG: 200 TABLET, FILM COATED ORAL at 08:22

## 2020-09-30 ASSESSMENT — PAIN DESCRIPTION - ORIENTATION
ORIENTATION: MID

## 2020-09-30 ASSESSMENT — PAIN DESCRIPTION - DESCRIPTORS
DESCRIPTORS: SHARP
DESCRIPTORS: ACHING
DESCRIPTORS: ACHING

## 2020-09-30 ASSESSMENT — PAIN SCALES - GENERAL
PAINLEVEL_OUTOF10: 6
PAINLEVEL_OUTOF10: 9
PAINLEVEL_OUTOF10: 6
PAINLEVEL_OUTOF10: 7
PAINLEVEL_OUTOF10: 7
PAINLEVEL_OUTOF10: 6
PAINLEVEL_OUTOF10: 9
PAINLEVEL_OUTOF10: 10
PAINLEVEL_OUTOF10: 9
PAINLEVEL_OUTOF10: 6
PAINLEVEL_OUTOF10: 10
PAINLEVEL_OUTOF10: 7
PAINLEVEL_OUTOF10: 5
PAINLEVEL_OUTOF10: 6
PAINLEVEL_OUTOF10: 10
PAINLEVEL_OUTOF10: 8
PAINLEVEL_OUTOF10: 9
PAINLEVEL_OUTOF10: 8
PAINLEVEL_OUTOF10: 8

## 2020-09-30 ASSESSMENT — PAIN DESCRIPTION - LOCATION
LOCATION: CHEST

## 2020-09-30 ASSESSMENT — PAIN - FUNCTIONAL ASSESSMENT
PAIN_FUNCTIONAL_ASSESSMENT: ACTIVITIES ARE NOT PREVENTED
PAIN_FUNCTIONAL_ASSESSMENT: ACTIVITIES ARE NOT PREVENTED

## 2020-09-30 ASSESSMENT — PAIN DESCRIPTION - ONSET
ONSET: ON-GOING

## 2020-09-30 ASSESSMENT — PAIN DESCRIPTION - PAIN TYPE
TYPE: ACUTE PAIN

## 2020-09-30 ASSESSMENT — PAIN DESCRIPTION - PROGRESSION
CLINICAL_PROGRESSION: NOT CHANGED
CLINICAL_PROGRESSION: NOT CHANGED

## 2020-09-30 ASSESSMENT — PAIN DESCRIPTION - FREQUENCY
FREQUENCY: INTERMITTENT
FREQUENCY: CONTINUOUS
FREQUENCY: INTERMITTENT

## 2020-09-30 NOTE — PLAN OF CARE
Problem: Skin Integrity:  Goal: Will show no infection signs and symptoms  Description: Will show no infection signs and symptoms  9/30/2020 1058 by Maria Luisa Pina RN  Outcome: Ongoing  9/30/2020 0246 by Dequan Bacon RN  Outcome: Ongoing  Goal: Absence of new skin breakdown  Description: Absence of new skin breakdown  9/30/2020 1058 by Maria Luisa Pina RN  Outcome: Ongoing  9/30/2020 0246 by Dequan Bacon RN  Outcome: Ongoing     Problem: Pain:  Goal: Pain level will decrease  Description: Pain level will decrease  9/30/2020 1058 by Maria Luisa Pina RN  Outcome: Ongoing  9/30/2020 0246 by Dequan Bacon RN  Outcome: Ongoing  Goal: Control of acute pain  Description: Control of acute pain  9/30/2020 1058 by Maria Luisa Pina RN  Outcome: Ongoing  9/30/2020 0246 by Dequan Bacon RN  Outcome: Ongoing  Goal: Control of chronic pain  Description: Control of chronic pain  9/30/2020 1058 by Maria Luisa Pina RN  Outcome: Ongoing  9/30/2020 0246 by Dequan Bacon RN  Outcome: Ongoing     Problem: Gas Exchange - Impaired:  Goal: Levels of oxygenation will improve  Description: Levels of oxygenation will improve  9/30/2020 1058 by Maria Luisa Pina RN  Outcome: Ongoing  9/30/2020 0246 by Dequan Baocn RN  Outcome: Ongoing     Problem: Cardiac:  Goal: Ability to maintain vital signs within normal range will improve  Description: Ability to maintain vital signs within normal range will improve  9/30/2020 1058 by Maria Luisa Pina RN  Outcome: Ongoing  9/30/2020 0246 by Dequan Bacon RN  Outcome: Ongoing  Goal: Cardiovascular alteration will improve  Description: Cardiovascular alteration will improve  9/30/2020 1058 by Maria Luisa Pina RN  Outcome: Ongoing  9/30/2020 0246 by Dequan Bacon RN  Outcome: Ongoing     Problem: Health Behavior:  Goal: Will modify at least one risk factor affecting health status  Description: Will modify at least one risk factor affecting health status  9/30/2020 1058 by Molina Wilde SE Gamez  Outcome: Ongoing  9/30/2020 0246 by Luci Monreal RN  Outcome: Ongoing  Goal: Identification of resources available to assist in meeting health care needs will improve  Description: Identification of resources available to assist in meeting health care needs will improve  9/30/2020 1058 by Sejal Navarro RN  Outcome: Ongoing  9/30/2020 0246 by Luci Monreal RN  Outcome: Ongoing     Problem: Physical Regulation:  Goal: Complications related to the disease process, condition or treatment will be avoided or minimized  Description: Complications related to the disease process, condition or treatment will be avoided or minimized  9/30/2020 1058 by Sejal Navarro RN  Outcome: Ongoing  9/30/2020 0246 by Luci Monreal RN  Outcome: Ongoing     Problem: Discharge Planning:  Goal: Discharged to appropriate level of care  Description: Discharged to appropriate level of care  Outcome: Ongoing

## 2020-09-30 NOTE — PROGRESS NOTES
Still forgetful throughout the night. chest pain relieved with prn pain meds per MD order.  New IV in [facility]

## 2020-09-30 NOTE — CARE COORDINATION
9/30  placed call to [facility] regarding need for Insurance coverage and need for assistance to go to chemo and radiation. They can accommodate the patient and they are certain that his Medicaid will cover, however if he requires Skilled care, he will need a pre-cert.   Electronically signed by Elie Roberto RN on 9/30/2020 at 8:57 AM No

## 2020-09-30 NOTE — PROGRESS NOTES
[facility]  Oncology Hematology Care  Progress Note      SUBJECTIVE:   Pt stable  He is told his path is pending   Scans are not metastatic  Christina held off on brain mri for now due to I think he wont tolerated it   ROS: No fever chills sweats, no nausea, vomiting, diarrhea  shortness of breath chest pain or other pain  OBJECTIVE      Medications    Current Facility-Administered Medications: nicotine (NICODERM CQ) 14 MG/24HR 1 patch, 1 patch, Transdermal, Daily  morphine (PF) injection 4 mg, 4 mg, Intravenous, Q2H PRN  cloNIDine (CATAPRES) tablet 0.3 mg, 0.3 mg, Oral, TID  aspirin tablet 325 mg, 325 mg, Oral, Daily  DULoxetine (CYMBALTA) extended release capsule 60 mg, 60 mg, Oral, Daily  escitalopram (LEXAPRO) tablet 10 mg, 10 mg, Oral, Daily  hydrALAZINE (APRESOLINE) tablet 25 mg, 25 mg, Oral, TID  labetalol (NORMODYNE) tablet 300 mg, 300 mg, Oral, BID  rosuvastatin (CRESTOR) tablet 5 mg, 5 mg, Oral, Nightly  sodium chloride flush 0.9 % injection 10 mL, 10 mL, Intravenous, 2 times per day  sodium chloride flush 0.9 % injection 10 mL, 10 mL, Intravenous, PRN  promethazine (PHENERGAN) tablet 12.5 mg, 12.5 mg, Oral, Q6H PRN **OR** ondansetron (ZOFRAN) injection 4 mg, 4 mg, Intravenous, Q6H PRN  enoxaparin (LOVENOX) injection 40 mg, 40 mg, Subcutaneous, Daily  potassium chloride 10 mEq/100 mL IVPB (Peripheral Line), 10 mEq, Intravenous, PRN  magnesium sulfate 2 g in 50 mL IVPB premix, 2 g, Intravenous, PRN  dexamethasone (DECADRON) injection 10 mg, 10 mg, Intravenous, Q12H  Physical    VITALS:  BP (!) 171/81   Pulse 84   Temp 97.6 °F (36.4 °C) (Oral)   Resp 18   Ht 6' (1.829 m)   Wt 185 lb 13.6 oz (84.3 kg)   SpO2 93%   BMI 25.21 kg/m²   TEMPERATURE:  Current - Temp: 97.6 °F (36.4 °C);  Max - Temp  Av.7 °F (36.5 °C)  Min: 97 °F (36.1 °C)  Max: 98.6 °F (37 °C)  PULSE OXIMETRY RANGE: SpO2  Av.6 %  Min: 90 %  Max: 100 %  24HR INTAKE/OUTPUT:      Intake/Output Summary (Last 24 hours) at 2020 0721  Last data filed at 9/30/2020 9969  Gross per 24 hour   Intake 3040 ml   Output 3200 ml   Net -160 ml       CONSTITUTIONAL:  awake, alert, cooperative, no apparent distress, HEENT oral pharynx , no scleral icterus  HEMATOLOGIC/LYMPHATICS:  no cervical lymphadenopathy, no supraclavicular lymphadenopathy, LUNGS:  No increased work of breathing, good air exchange, clear to auscultation bilaterally, no crackles or wheezing  CARDIOVASCULAR:  , regular rate and rhythm, normal S1 and S2, no S3 or S4, and no murmur noted  ABDOMEN:  No scars, normal bowel sounds, soft, non-distended, non-tender, no masses palpated, no hepatosplenomegally  MUSCULOSKELETAL:  There is no redness, warmth, or swelling of the joints. EXTREMETIES: bilateral above amputation     Data      Recent Labs     09/28/20  1734   WBC 10.2   HGB 12.8*   HCT 37.5*      MCV 88.9        Recent Labs     09/28/20  1734   *   K 4.2   CL 93*   CO2 26   BUN 5*   CREATININE 0.6*     Recent Labs     09/28/20  1734   AST 12*   ALT 12   BILITOT 0.3   ALKPHOS 127       Magnesium:    Lab Results   Component Value Date    MG 2.20 02/29/2020       Imaging Ct Abdomen Pelvis W Iv Contrast Additional Contrast? Oral    Result Date: 9/29/2020  EXAMINATION: CT OF THE ABDOMEN AND PELVIS WITH CONTRAST 9/29/2020 6:03 pm TECHNIQUE: CT of the abdomen and pelvis was performed with the administration of intravenous contrast. Multiplanar reformatted images are provided for review. Dose modulation, iterative reconstruction, and/or weight based adjustment of the mA/kV was utilized to reduce the radiation dose to as low as reasonably achievable. COMPARISON: CT chest 09/28/2020, CT abdomen and pelvis 06/13/2018 HISTORY: ORDERING SYSTEM PROVIDED HISTORY: lung mass staging TECHNOLOGIST PROVIDED HISTORY: Reason for exam:->lung mass staging Additional Contrast?->Oral Reason for Exam: lung mass staging FINDINGS: Lower Chest: Known right hilar mass, incompletely imaged.   Basilar right lower lobe atelectasis. Trace pleural effusions. Organs: Bilateral renal cysts up to 1 cm on the left. Tiny hepatic cysts up to 4 mm. No solid hepatic mass. Normal adrenal morphology. Spleen and pancreas unremarkable. Hepatic excreted contrast in the gallbladder GI/Bowel: No gastrointestinal abnormality. No mesenteric adenopathy Pelvis: Urinary bladder unremarkable. No pelvic adenopathy. Fat containing bilateral inguinal hernias Peritoneum/Retroperitoneum: No retroperitoneal adenopathy. No ascites or peritoneal tumor. Atherosclerosis of normal caliber abdominal aorta Bones/Soft Tissues: No suspicious bone lesion     No evidence of abdominopelvic metastatic disease     Xr Chest Portable    Result Date: 9/28/2020  EXAMINATION: ONE XRAY VIEW OF THE CHEST 9/28/2020 5:35 pm COMPARISON: 02/29/2020 HISTORY: ORDERING SYSTEM PROVIDED HISTORY: Chest pain TECHNOLOGIST PROVIDED HISTORY: Reason for exam:->Chest pain Reason for Exam: Chest Pain Acuity: Chronic Type of Exam: Ongoing FINDINGS: Heart size and pulmonary vessels within normal limits. Interval development of right hilar/suprahilar mass. Right lung clear with a sharp costophrenic angle. Blunting of the left costophrenic angle     1. New right hilar/suprahilar mass. Recommend CT chest 2. Left pleural effusion     Ct Chest Pulmonary Embolism W Contrast    Result Date: 9/29/2020  EXAMINATION: CTA OF THE CHEST 9/28/2020 6:38 pm TECHNIQUE: CTA of the chest was performed after the administration of intravenous contrast.  Multiplanar reformatted images are provided for review. MIP images are provided for review. Dose modulation, iterative reconstruction, and/or weight based adjustment of the mA/kV was utilized to reduce the radiation dose to as low as reasonably achievable.  COMPARISON: Same date chest radiograph HISTORY: ORDERING SYSTEM PROVIDED HISTORY: SOB suspect pulmonary embolus TECHNOLOGIST PROVIDED HISTORY: Reason for exam:->SOB suspect pulmonary embolus Reason for Exam: SOB suspect pulmonary embolus Acuity: Acute Type of Exam: Initial FINDINGS: Pulmonary Arteries: Pulmonary arteries are adequately opacified for evaluation. No evidence of intraluminal filling defect to suggest pulmonary embolism. Main pulmonary artery is normal in caliber. Mediastinum: CT confirms that there is right suprahilar conglomerate mian/pulmonic mass which results in obliteration of the traversing SVC segment and narrows the main right pulmonary artery. This also narrows the distal right mainstem bronchus. Exact measurement is difficult due to its configuration and confluent nature but it measures approximately 8.3 x 6.7 cm (series 2, image 187). Diffuse subcutaneous fat stranding of the mediastinum is felt to be related to some degree SVC syndrome/obstruction from the aforementioned mass. The heart and pericardium demonstrate no acute abnormality. There is no acute abnormality of the thoracic aorta. Lungs/pleura: Conglomerate left right upper/suprahilar pulmonic mass which is confluent with the large right hilar and pretracheal lymphadenopathy. There is postobstructive atelectasis of the right lung apex with interlobular septal thickening. Trace bilateral pleural effusions noted. Upper Abdomen: Subcentimeter low densities in the liver noted. No calcified gallstones. The adrenal glands are unremarkable. Moderate-severe atherosclerotic aortic disease which affects the origins of the great vessels and visceral vasculature. Soft Tissues/Bones: No acute bone or soft tissue abnormality. No evidence of pulmonary embolism. Large 8.3 cm right suprahilar/upper lobe pulmonic mass which is confluent with enlarged right hilar and pretracheal lymph nodes. This is consistent with bronchogenic carcinoma. The mass significantly narrows the traversing mid SVC segment and the presence of fat infiltration of the mediastinum could indicate some degree of SVC syndrome/obstruction.  Trace bilateral pleural effusions. Nm Bone Scan Whole Body    Result Date: 9/29/2020  EXAMINATION: WHOLE BODY BONE SCAN  9/29/2020 TECHNIQUE: The patient was injected intravenously with 29.3 mCi of 99 mTc MDP and scintigraphy of the entire skeleton was performed approximately three hours later. COMPARISON: Patient did not have previous imaging studies for comparison. HISTORY: ORDERING SYSTEM PROVIDED HISTORY: lung ca staging TECHNOLOGIST PROVIDED HISTORY: Reason for exam:->lung ca staging Reason for Exam: new dx lung Ca Acuity: Unknown Type of Exam: Initial Relevant Medical/Surgical History: Bilateral leg amputations FINDINGS: Patient is status post amputation of the lower extremities at the level of the mid thighs. Diffuse activity seen throughout the spine, without singular level of intense activity, likely degenerative. Mild activity at bilateral shoulders, sternoclavicular joints, hips, likely degenerative. Paranasal activity, typically on the basis of sinus disease or healing trauma. Physiologic activity is seen within the kidneys. Bladder has been masked, obscuring the central pelvis. Multifocal degenerative change, without a generalized scintigraphic pattern of osseous metastatic disease.        Problem List  Patient Active Problem List   Diagnosis    Type 2 diabetes mellitus with circulatory disorder ([address]    Mixed hyperlipidemia    Essential hypertension    Phantom limb syndrome with pain ([address]    Tobacco abuse    History of substance abuse ([address]    Chronic pain syndrome    Lumbosacral spondylosis without myelopathy    Superior vena cava compression syndrome    Mediastinal mass       ASSESSMENT AND PLAN    Lung mass  Appears locally advanced--no mets  WIll await path -as mentioned if small cell will tx inpt for first dose-if not will discuss with rad onc  I need help from social work-when pts live in a care facility coverage of [facility] is tricky-I have asked my office to check on this as well as getting him tx will hve to be figured out based on insurance etc  Will need port as well  Brissa Banks MD

## 2020-09-30 NOTE — PROGRESS NOTES
Pulmonary Progress Note    Date of Admission: 9/28/2020   LOS: 2 days       CC:  Lung mass    Subjective:  Doing well at the bronchoscopy. No complications were hemoptysis. No shortness of breath. ROS:   No nausea  No Vomiting  No chest pain        PHYSICAL EXAM:   Blood pressure (!) 171/81, pulse 84, temperature 97.6 °F (36.4 °C), temperature source Oral, resp. rate 18, height 6' (1.829 m), weight 185 lb 13.6 oz (84.3 kg), SpO2 93 %.'  Gen: No distress. ENT:   Resp: No accessory muscle use. No crackles. No wheezes. No rhonchi. CV: Regular rate. Regular rhythm. No murmur or rub. No edema. Skin: Warm, dry, normal texture and turgor. No nodule on exposed extremities. Bilateral AKA  M/S: No cyanosis. No clubbing. No joint deformity. Psych: Oriented x 3. No anxiety. Awake. Alert. Intact judgement and insight. Good Mood / Affect. Memory appears in tact     Medications:    Scheduled Meds:   nicotine  1 patch Transdermal Daily    cloNIDine  0.3 mg Oral TID    aspirin  325 mg Oral Daily    DULoxetine  60 mg Oral Daily    escitalopram  10 mg Oral Daily    hydrALAZINE  25 mg Oral TID    labetalol  300 mg Oral BID    rosuvastatin  5 mg Oral Nightly    sodium chloride flush  10 mL Intravenous 2 times per day    enoxaparin  40 mg Subcutaneous Daily    dexamethasone  10 mg Intravenous Q12H       Continuous Infusions:      PRN Meds:  morphine, sodium chloride flush, promethazine **OR** ondansetron, potassium chloride, magnesium sulfate    Labs reviewed:  CBC:   Recent Labs     09/28/20  1734   WBC 10.2   HGB 12.8*   HCT 37.5*   MCV 88.9        BMP:   Recent Labs     09/28/20  1734   *   K 4.2   CL 93*   CO2 26   BUN 5*   CREATININE 0.6*     LIVER PROFILE:   Recent Labs     09/28/20  1734   AST 12*   ALT 12   BILITOT 0.3   ALKPHOS 127     PT/INR: No results for input(s): PROTIME, INR in the last 72 hours. APTT: No results for input(s): APTT in the last 72 hours.   UA:No results for input(s):

## 2020-09-30 NOTE — PLAN OF CARE
Problem: Skin Integrity:  Goal: Will show no infection signs and symptoms  Description: Will show no infection signs and symptoms  Outcome: Ongoing  Goal: Absence of new skin breakdown  Description: Absence of new skin breakdown  Outcome: Ongoing     Problem: Pain:  Goal: Pain level will decrease  Description: Pain level will decrease  Outcome: Ongoing  Goal: Control of acute pain  Description: Control of acute pain  Outcome: Ongoing  Goal: Control of chronic pain  Description: Control of chronic pain  Outcome: Ongoing     Problem: Gas Exchange - Impaired:  Goal: Levels of oxygenation will improve  Description: Levels of oxygenation will improve  Outcome: Ongoing     Problem: Cardiac:  Goal: Ability to maintain vital signs within normal range will improve  Description: Ability to maintain vital signs within normal range will improve  Outcome: Ongoing  Goal: Cardiovascular alteration will improve  Description: Cardiovascular alteration will improve  Outcome: Ongoing     Problem: Health Behavior:  Goal: Will modify at least one risk factor affecting health status  Description: Will modify at least one risk factor affecting health status  Outcome: Ongoing  Goal: Identification of resources available to assist in meeting health care needs will improve  Description: Identification of resources available to assist in meeting health care needs will improve  Outcome: Ongoing     Problem: Physical Regulation:  Goal: Complications related to the disease process, condition or treatment will be avoided or minimized  Description: Complications related to the disease process, condition or treatment will be avoided or minimized  Outcome: Ongoing

## 2020-09-30 NOTE — PROGRESS NOTES
Hospitalist Progress Note      PCP: Danielle Valentino MD    Date of Admission: 9/28/2020    Chief Complaint: Chest pain    Hospital Course: Patient is a 29-year-old  male who presented to the hospital with a 2 through the progressively worsening anterior chest pain along with back pain that is associated with shortness of breath. Patient has a history of severe peripheral vascular disease status post bilateral above-the-knee amputations, type 2 diabetes, hypertension and dyslipidemia. In the emergency department a chest x-ray was performed which showed a possible right hilar mass. CT of the chest was then performed which showed a large 8.3 cm right suprahilar upper lobe pulmonary mass with compression of the hilar and paratracheal nodes and narrowing of the mid SVC segment with evidence of some degree of SVC obstruction. There is also concern for a right-sided locally advanced bronchogenic cancer. Pulmonology was consulted and will likely need bronchoscopy with biopsy. Hematology oncology also consulted. Subjective: Patient seen and examined. Patient without any complaints other than his chest pain. Will add on long-acting oral pain medication to supplement his IV pain medication. 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     Medications:  Reviewed    Infusion Medications   Scheduled Medications    nicotine  1 patch Transdermal Daily    cloNIDine  0.3 mg Oral TID    aspirin  325 mg Oral Daily    DULoxetine  60 mg Oral Daily    escitalopram  10 mg Oral Daily    hydrALAZINE  25 mg Oral TID    labetalol  300 mg Oral BID    rosuvastatin  5 mg Oral Nightly    sodium chloride flush  10 mL Intravenous 2 times per day    enoxaparin  40 mg Subcutaneous Daily    dexamethasone  10 mg Intravenous Q12H     PRN Meds: morphine, sodium chloride flush, promethazine **OR** ondansetron, potassium chloride, magnesium sulfate      Intake/Output Summary (Last 24 hours) at 9/30/2020 0954  Last data filed at 9/30/2020 0847  Gross per 24 hour   Intake 3290 ml   Output 2625 ml   Net 665 ml       Physical Exam Performed:    BP (!) 214/110   Pulse 99   Temp 98.2 °F (36.8 °C) (Oral)   Resp 18   Ht 6' (1.829 m)   Wt 185 lb 13.6 oz (84.3 kg)   SpO2 94%   BMI 25.21 kg/m²     General appearance: No apparent distress, appears stated age and cooperative. HEENT: Pupils equal, round, and reactive to light. Conjunctivae/corneas clear. Neck: Supple, with full range of motion. No jugular venous distention. Trachea midline. Respiratory:  Normal respiratory effort. Clear to auscultation, bilaterally without Rales/Wheezes/Rhonchi. Cardiovascular: Regular rate and rhythm with normal S1/S2  Abdomen: Soft, non-tender, non-distended with normal bowel sounds. Musculoskeletal: Bilateral AKA  Skin: Skin color, texture, turgor normal.  No rashes or lesions. Neurologic:  Neurovascularly intact without any focal sensory/motor deficits. Cranial nerves: II-XII intact, grossly non-focal.  Psychiatric: Alert and oriented, thought content appropriate, normal insight  Capillary Refill: Brisk,< 3 seconds   Peripheral Pulses: +2 palpable upper extremity, equal bilaterally       Labs:   Recent Labs     09/28/20  1734   WBC 10.2   HGB 12.8*   HCT 37.5*        Recent Labs     09/28/20  1734   *   K 4.2   CL 93*   CO2 26   BUN 5*   CREATININE 0.6*   CALCIUM 9.2     Recent Labs     09/28/20  1734   AST 12*   ALT 12   BILITOT 0.3   ALKPHOS 127     No results for input(s): INR in the last 72 hours.   Recent Labs     09/28/20  1734   TROPONINI <0.01       Urinalysis:      Lab Results   Component Value Date    NITRU Negative 02/29/2020    WBCUA 1 02/29/2020    RBCUA 4 02/29/2020    BLOODU Negative 02/29/2020    SPECGRAV 1.016 02/29/2020    GLUCOSEU Negative 02/29/2020       Radiology:  CT ABDOMEN PELVIS W IV CONTRAST Additional Contrast? Oral   Final Result   No evidence of abdominopelvic metastatic disease         NM BONE SCAN WHOLE BODY   Final Result   Multifocal degenerative change, without a generalized scintigraphic pattern   of osseous metastatic disease. CT CHEST PULMONARY EMBOLISM W CONTRAST   Final Result   No evidence of pulmonary embolism. Large 8.3 cm right suprahilar/upper lobe pulmonic mass which is confluent   with enlarged right hilar and pretracheal lymph nodes. This is consistent   with bronchogenic carcinoma. The mass significantly narrows the traversing   mid SVC segment and the presence of fat infiltration of the mediastinum could   indicate some degree of SVC syndrome/obstruction. Trace bilateral pleural effusions. XR CHEST PORTABLE   Final Result   1. New right hilar/suprahilar mass. Recommend CT chest   2.  Left pleural effusion         IR PORT PLACEMENT > 5 YEARS    (Results Pending)           Assessment/Plan:    Superior vena cava syndrome  With obstruction secondary to new diagnosis of right hilar mass  Pulmonology performed bronchoscopy and biopsy 9/29  Hematology oncology consulted for likely cancerous mass, will need to start chemo and radiation    Right hilar mass  Likely bronchogenic cancer  Pulmonology and oncology consulted  Biopsy with   A: Lymph Node, 4 R, EBUS Fine Needle Aspiration:   -  Atypical cells present, suspicious for malignancy     B: Lymph Node 11 R, EBUS Fine Needle Aspiration:   -  Positive for malignancy   -  Small cell neuroendocrine carcinoma     C: Lymph Node, 7, EBUS Fine Needle Aspiration:   -  Positive for malignancy   -  Small cell neuroendocrine carcinoma   CT abdomen pelvis ordered for lung cancer staging  Nuclear medicine whole-body scan with multifocal degenerative changes without a generalized scintigraphic pattern of osseous metastatic disease    Severe peripheral vascular disease  Continue aspirin and statin    Hypertension  Continue home clonidine, hydralazine, labetalol    Hyperlipidemia  Continue home statin    Tobacco dependence  Advised smoking cessation  Nicotine patch    DVT Prophylaxis: Lovenox  Diet: DIET GENERAL;  Code Status: Full Code    PT/OT Eval Status: Not applicable    Dispo -inpatient    Christine Lei MD

## 2020-10-01 ENCOUNTER — APPOINTMENT (OUTPATIENT)
Dept: INTERVENTIONAL RADIOLOGY/VASCULAR | Age: 60
DRG: 136 | End: 2020-10-01
Payer: MEDICAID

## 2020-10-01 LAB
ANION GAP SERPL CALCULATED.3IONS-SCNC: 11 MMOL/L (ref 3–16)
BUN BLDV-MCNC: 10 MG/DL (ref 7–20)
CALCIUM SERPL-MCNC: 8.9 MG/DL (ref 8.3–10.6)
CHLORIDE BLD-SCNC: 97 MMOL/L (ref 99–110)
CO2: 21 MMOL/L (ref 21–32)
CREAT SERPL-MCNC: <0.5 MG/DL (ref 0.8–1.3)
GFR AFRICAN AMERICAN: >60
GFR NON-AFRICAN AMERICAN: >60
GLUCOSE BLD-MCNC: 146 MG/DL (ref 70–99)
HCT VFR BLD CALC: 37.7 % (ref 40.5–52.5)
HEMOGLOBIN: 12.6 G/DL (ref 13.5–17.5)
MCH RBC QN AUTO: 30.1 PG (ref 26–34)
MCHC RBC AUTO-ENTMCNC: 33.4 G/DL (ref 31–36)
MCV RBC AUTO: 89.9 FL (ref 80–100)
PDW BLD-RTO: 14.3 % (ref 12.4–15.4)
PLATELET # BLD: 351 K/UL (ref 135–450)
PMV BLD AUTO: 6.2 FL (ref 5–10.5)
POTASSIUM REFLEX MAGNESIUM: 4.3 MMOL/L (ref 3.5–5.1)
RBC # BLD: 4.19 M/UL (ref 4.2–5.9)
SODIUM BLD-SCNC: 129 MMOL/L (ref 136–145)
WBC # BLD: 7.7 K/UL (ref 4–11)

## 2020-10-01 PROCEDURE — 36415 COLL VENOUS BLD VENIPUNCTURE: CPT

## 2020-10-01 PROCEDURE — 99152 MOD SED SAME PHYS/QHP 5/>YRS: CPT

## 2020-10-01 PROCEDURE — 77001 FLUOROGUIDE FOR VEIN DEVICE: CPT

## 2020-10-01 PROCEDURE — 76937 US GUIDE VASCULAR ACCESS: CPT

## 2020-10-01 PROCEDURE — 6370000000 HC RX 637 (ALT 250 FOR IP): Performed by: INTERNAL MEDICINE

## 2020-10-01 PROCEDURE — 6360000002 HC RX W HCPCS: Performed by: INTERNAL MEDICINE

## 2020-10-01 PROCEDURE — C1769 GUIDE WIRE: HCPCS

## 2020-10-01 PROCEDURE — 94760 N-INVAS EAR/PLS OXIMETRY 1: CPT

## 2020-10-01 PROCEDURE — 80048 BASIC METABOLIC PNL TOTAL CA: CPT

## 2020-10-01 PROCEDURE — 02H633Z INSERTION OF INFUSION DEVICE INTO RIGHT ATRIUM, PERCUTANEOUS APPROACH: ICD-10-PCS | Performed by: RADIOLOGY

## 2020-10-01 PROCEDURE — 99153 MOD SED SAME PHYS/QHP EA: CPT

## 2020-10-01 PROCEDURE — 2700000000 HC OXYGEN THERAPY PER DAY

## 2020-10-01 PROCEDURE — 1200000000 HC SEMI PRIVATE

## 2020-10-01 PROCEDURE — 6360000002 HC RX W HCPCS: Performed by: RADIOLOGY

## 2020-10-01 PROCEDURE — 2580000003 HC RX 258: Performed by: INTERNAL MEDICINE

## 2020-10-01 PROCEDURE — B5181ZA FLUOROSCOPY OF SUPERIOR VENA CAVA USING LOW OSMOLAR CONTRAST, GUIDANCE: ICD-10-PCS | Performed by: RADIOLOGY

## 2020-10-01 PROCEDURE — 36561 INSERT TUNNELED CV CATH: CPT

## 2020-10-01 PROCEDURE — 85027 COMPLETE CBC AUTOMATED: CPT

## 2020-10-01 PROCEDURE — 0JH60WZ INSERTION OF TOTALLY IMPLANTABLE VASCULAR ACCESS DEVICE INTO CHEST SUBCUTANEOUS TISSUE AND FASCIA, OPEN APPROACH: ICD-10-PCS | Performed by: RADIOLOGY

## 2020-10-01 RX ORDER — HYDRALAZINE HYDROCHLORIDE 20 MG/ML
10 INJECTION INTRAMUSCULAR; INTRAVENOUS EVERY 6 HOURS PRN
Status: DISCONTINUED | OUTPATIENT
Start: 2020-10-01 | End: 2020-10-04

## 2020-10-01 RX ORDER — ALLOPURINOL 100 MG/1
100 TABLET ORAL DAILY
Status: DISCONTINUED | OUTPATIENT
Start: 2020-10-02 | End: 2020-10-02

## 2020-10-01 RX ORDER — DEXAMETHASONE SODIUM PHOSPHATE 4 MG/ML
8 INJECTION, SOLUTION INTRA-ARTICULAR; INTRALESIONAL; INTRAMUSCULAR; INTRAVENOUS; SOFT TISSUE DAILY
Status: DISCONTINUED | OUTPATIENT
Start: 2020-10-02 | End: 2020-10-02 | Stop reason: CLARIF

## 2020-10-01 RX ORDER — LORAZEPAM 2 MG/ML
INJECTION INTRAMUSCULAR DAILY PRN
Status: COMPLETED | OUTPATIENT
Start: 2020-10-01 | End: 2020-10-01

## 2020-10-01 RX ADMIN — DEXAMETHASONE SODIUM PHOSPHATE 10 MG: 10 INJECTION INTRAMUSCULAR; INTRAVENOUS at 20:50

## 2020-10-01 RX ADMIN — CLONIDINE HYDROCHLORIDE 0.3 MG: 0.1 TABLET ORAL at 20:32

## 2020-10-01 RX ADMIN — CLONIDINE HYDROCHLORIDE 0.3 MG: 0.1 TABLET ORAL at 13:37

## 2020-10-01 RX ADMIN — LORAZEPAM 1 MG: 2 INJECTION INTRAMUSCULAR; INTRAVENOUS at 15:29

## 2020-10-01 RX ADMIN — ROSUVASTATIN CALCIUM 5 MG: 10 TABLET, FILM COATED ORAL at 20:33

## 2020-10-01 RX ADMIN — HYDRALAZINE HYDROCHLORIDE 25 MG: 25 TABLET, FILM COATED ORAL at 13:37

## 2020-10-01 RX ADMIN — CEFAZOLIN SODIUM 2 G: 10 INJECTION, POWDER, FOR SOLUTION INTRAVENOUS at 15:27

## 2020-10-01 RX ADMIN — LABETALOL HYDROCHLORIDE 300 MG: 200 TABLET, FILM COATED ORAL at 08:45

## 2020-10-01 RX ADMIN — SODIUM CHLORIDE, PRESERVATIVE FREE 10 ML: 5 INJECTION INTRAVENOUS at 08:51

## 2020-10-01 RX ADMIN — MORPHINE SULFATE 4 MG: 4 INJECTION, SOLUTION INTRAMUSCULAR; INTRAVENOUS at 22:29

## 2020-10-01 RX ADMIN — HYDRALAZINE HYDROCHLORIDE 25 MG: 25 TABLET, FILM COATED ORAL at 20:33

## 2020-10-01 RX ADMIN — DULOXETINE HYDROCHLORIDE 60 MG: 60 CAPSULE, DELAYED RELEASE ORAL at 08:46

## 2020-10-01 RX ADMIN — MORPHINE SULFATE 4 MG: 4 INJECTION, SOLUTION INTRAMUSCULAR; INTRAVENOUS at 05:31

## 2020-10-01 RX ADMIN — OXYCODONE HYDROCHLORIDE 10 MG: 10 TABLET, FILM COATED, EXTENDED RELEASE ORAL at 08:46

## 2020-10-01 RX ADMIN — Medication 10 ML: at 02:39

## 2020-10-01 RX ADMIN — LABETALOL HYDROCHLORIDE 300 MG: 200 TABLET, FILM COATED ORAL at 20:32

## 2020-10-01 RX ADMIN — DEXAMETHASONE SODIUM PHOSPHATE 10 MG: 10 INJECTION INTRAMUSCULAR; INTRAVENOUS at 10:27

## 2020-10-01 RX ADMIN — OXYCODONE HYDROCHLORIDE 10 MG: 10 TABLET, FILM COATED, EXTENDED RELEASE ORAL at 20:33

## 2020-10-01 RX ADMIN — MORPHINE SULFATE 4 MG: 4 INJECTION, SOLUTION INTRAMUSCULAR; INTRAVENOUS at 02:38

## 2020-10-01 RX ADMIN — CLONIDINE HYDROCHLORIDE 0.3 MG: 0.1 TABLET ORAL at 08:45

## 2020-10-01 RX ADMIN — HYDRALAZINE HYDROCHLORIDE 25 MG: 25 TABLET, FILM COATED ORAL at 08:45

## 2020-10-01 RX ADMIN — SODIUM CHLORIDE, PRESERVATIVE FREE 10 ML: 5 INJECTION INTRAVENOUS at 21:00

## 2020-10-01 RX ADMIN — ESCITALOPRAM OXALATE 10 MG: 10 TABLET ORAL at 08:45

## 2020-10-01 ASSESSMENT — PAIN SCALES - GENERAL
PAINLEVEL_OUTOF10: 9
PAINLEVEL_OUTOF10: 7
PAINLEVEL_OUTOF10: 5
PAINLEVEL_OUTOF10: 7
PAINLEVEL_OUTOF10: 9
PAINLEVEL_OUTOF10: 0
PAINLEVEL_OUTOF10: 0
PAINLEVEL_OUTOF10: 7
PAINLEVEL_OUTOF10: 9
PAINLEVEL_OUTOF10: 6

## 2020-10-01 ASSESSMENT — PAIN DESCRIPTION - ORIENTATION
ORIENTATION: MID

## 2020-10-01 ASSESSMENT — PAIN DESCRIPTION - DESCRIPTORS
DESCRIPTORS: ACHING

## 2020-10-01 ASSESSMENT — PAIN DESCRIPTION - PAIN TYPE
TYPE: ACUTE PAIN

## 2020-10-01 ASSESSMENT — PAIN DESCRIPTION - FREQUENCY
FREQUENCY: CONTINUOUS

## 2020-10-01 ASSESSMENT — PAIN DESCRIPTION - PROGRESSION
CLINICAL_PROGRESSION: NOT CHANGED

## 2020-10-01 ASSESSMENT — PAIN DESCRIPTION - ONSET
ONSET: ON-GOING

## 2020-10-01 ASSESSMENT — PAIN DESCRIPTION - LOCATION
LOCATION: GENERALIZED
LOCATION: GENERALIZED
LOCATION: CHEST;GENERALIZED
LOCATION: GENERALIZED

## 2020-10-01 ASSESSMENT — PAIN DESCRIPTION - DIRECTION
RADIATING_TOWARDS: MID
RADIATING_TOWARDS: MID

## 2020-10-01 NOTE — FLOWSHEET NOTE
Pt requested to use wheelchair to use bathroom and pt was advised that a wheelchair was not available and that a bedpan or a bedside commode could be provided. Pt then called 911 and security came up to the floor and spoke with pt. Pt then yelled at security and stated he was going to throw himself thru the window if he didn't get a wheelchair because he was going to leave and to point him in the right direction so he could wheel himself to his NH. Pt has been angry and agitated all night d/t wanting to get his belongings from his NH. Pt has ripped off his tele box, ID bracelet and fall bracelet. This nurse tried to call Nayely Hensley at [phone] and the phone rang continuously with no answer. Pt states his social security check is deposited into his checking today and is afraid someone will steal his checkbook and take his money.

## 2020-10-01 NOTE — PLAN OF CARE
Skin assessment performed each shift per protocol. Patient turned and repositioned every two hours and prn with pillow support. Patient checked for incontence every two hours. Pt able to express presence/absence of pain and rate pain appropriately using numerical scale. Pain/discomfort being managed with PRN analgesics per MD orders (see MAR). Pain assessed every shift and after interventions. No c/o respiratory distress or SOB. Remains on a Tele-monitor with cardiac rhythm being monitored. VS taken every 4 hours and prn. Pt is mentally stressed and upset about his hospital stay and not having his belongings from his NH. Pt very angry about his NH and treatment there. Continuing to work with patient and health care team on discharge plan. Discharge instructions and medication management will be reviewed prior to discharge.
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
## 2020-10-01 NOTE — PROGRESS NOTES
Patient was recently transferred to  following port placement. Patient is currently NPO. Dr. Diamond Saunders aware and notified. See new diet order. Will continue to monitor and reassess.  Electronically signed by Alexa Travis RN on 10/1/2020

## 2020-10-01 NOTE — PRE SEDATION
Sedation Pre-Procedure Note    Patient Name: Yoandy Mary   YOB: 1960  Room/Bed: P7Z-9061/9453-14  Medical Record Number: [id number]  Date: 10/1/2020   Time: 3:17 PM       Indication:  Small cell lung cancer    Consent: I have discussed with the patient and/or the patient representative the indication, alternatives, and the possible risks and/or complications of the planned procedure and the anesthesia methods. The patient and/or patient representative appear to understand and agree to proceed. Vital Signs:   Vitals:    10/01/20 1403   BP: 135/86   Pulse: 75   Resp: 16   Temp: 97.8 °F (36.6 °C)   SpO2: 95%       Past Medical History:   has a past medical history of Anxiety, Heart disease, Hyperlipidemia, Hypertension, Peripheral vascular disease, unspecified ([address] Phantom limb (syndrome), and Type II or unspecified type diabetes mellitus without mention of complication, not stated as uncontrolled. Past Surgical History:   has a past surgical history that includes above knee amputation ([id number]); Dental surgery (4-14); bronchoscopy (N/A, 9/29/2020); and bronchoscopy (9/29/2020). Medications:   Scheduled Meds:    oxyCODONE  10 mg Oral 2 times per day    ceFAZolin  2 g Intravenous 60 Min Pre-Op    nicotine  1 patch Transdermal Daily    cloNIDine  0.3 mg Oral TID    aspirin  325 mg Oral Daily    DULoxetine  60 mg Oral Daily    escitalopram  10 mg Oral Daily    hydrALAZINE  25 mg Oral TID    labetalol  300 mg Oral BID    rosuvastatin  5 mg Oral Nightly    sodium chloride flush  10 mL Intravenous 2 times per day    enoxaparin  40 mg Subcutaneous Daily    dexamethasone  10 mg Intravenous Q12H     Continuous Infusions:   PRN Meds: hydrALAZINE, morphine, sodium chloride flush, promethazine **OR** ondansetron, potassium chloride, magnesium sulfate  Home Meds:   Prior to Admission medications    Medication Sig Start Date End Date Taking?  Authorizing Provider   diphenhydrAMINE (BENADRYL) 25 MG tablet Take 12.5 mg by mouth every 6 hours as needed for Itching   Yes Historical Provider, MD   hydrALAZINE (APRESOLINE) 25 MG tablet Take 25 mg by mouth 3 times daily   Yes Historical Provider, MD   potassium chloride (MICRO-K) 10 MEQ extended release capsule Take 10 mEq by mouth daily   Yes Historical Provider, MD   Lidocaine-Glycerin 5-14.4 % CREA Place rectally every 6 hours as needed (hemorroids)   Yes Historical Provider, MD   labetalol (NORMODYNE) 300 MG tablet Take 1 tablet by mouth 2 times daily 2/13/20  Yes Tobi Mcconnell MD   cloNIDine (CATAPRES) 0.3 MG tablet Take 1 tablet by mouth 3 times daily 2/13/20  Yes Tobi Mcconnell MD   aspirin 325 MG tablet Take 1 tablet by mouth daily 2/13/20  Yes Tobi Mcconnell MD   omeprazole (PRILOSEC) 20 MG delayed release capsule Take 1 capsule by mouth Daily 2/13/20  Yes Tobi Mcconnell MD   rosuvastatin (CRESTOR) 5 MG tablet Take 1 tablet by mouth nightly 2/13/20  Yes Tobi Mcconnell MD   DULoxetine (CYMBALTA) 60 MG extended release capsule Take 1 capsule by mouth daily 2/13/20  Yes Tobi Mcconnell MD   escitalopram (LEXAPRO) 10 MG tablet TAKE ONE TABLET BY MOUTH DAILY 12/23/19  Yes Tobi Mcconnell MD     Coumadin Use Last 7 Days:  no  Antiplatelet drug therapy use last 7 days: no  Other anticoagulant use last 7 days: no  Additional Medication Information:        Pre-Sedation Documentation and Exam:   I have reviewed the patient's history and review of systems.     Mallampati Airway Assessment:  normal neck range of motion    Prior History of Anesthesia Complications:   none    ASA Classification:  Class 3 - A patient with severe systemic disease that limits activity but is not incapacitating    Sedation/ Anesthesia Plan:   intravenous sedation    Medications Planned:   midazolam (Versed) intravenously and fentanyl intravenously    Patient is an appropriate candidate for plan of sedation: yes    Electronically signed by Rudi Tamayo MD on 10/1/2020 at 3:17 PM

## 2020-10-01 NOTE — PLAN OF CARE
Problem: Skin Integrity:  Goal: Will show no infection signs and symptoms  Description: Will show no infection signs and symptoms  10/1/2020 0313 by Jami Tran RN  Outcome: Ongoing     Problem: Skin Integrity:  Goal: Absence of new skin breakdown  Description: Absence of new skin breakdown  10/1/2020 0313 by Jami Tran RN  Outcome: Ongoing     Problem: Pain:  Goal: Pain level will decrease  Description: Pain level will decrease  10/1/2020 0313 by Jami Tran RN  Outcome: Ongoing     Problem: Pain:  Goal: Control of acute pain  Description: Control of acute pain  10/1/2020 0313 by Jami Tran RN  Outcome: Ongoing     Problem: Pain:  Goal: Control of chronic pain  Description: Control of chronic pain  10/1/2020 0313 by Jami Tran RN  Outcome: Ongoing     Problem: Gas Exchange - Impaired:  Goal: Levels of oxygenation will improve  Description: Levels of oxygenation will improve  10/1/2020 0313 by Jami Tran RN  Outcome: Ongoing     Problem: Cardiac:  Goal: Ability to maintain vital signs within normal range will improve  Description: Ability to maintain vital signs within normal range will improve  10/1/2020 0313 by Jami Tran RN  Outcome: Ongoing     Problem: Cardiac:  Goal: Cardiovascular alteration will improve  Description: Cardiovascular alteration will improve  10/1/2020 0313 by Jami Tran RN  Outcome: Ongoing     Problem: Health Behavior:  Goal: Will modify at least one risk factor affecting health status  Description: Will modify at least one risk factor affecting health status  10/1/2020 0313 by Jami Tran RN  Outcome: Ongoing     Problem: Health Behavior:  Goal: Identification of resources available to assist in meeting health care needs will improve  Description: Identification of resources available to assist in meeting health care needs will improve  10/1/2020 0313 by Jami Tran RN  Outcome: Ongoing     Problem: Physical Regulation:  Goal: Complications related to the disease process, condition or treatment will be avoided or minimized  Description: Complications related to the disease process, condition or treatment will be avoided or minimized  10/1/2020 0313 by Jalyn Mcallister RN  Outcome: Ongoing     Problem: Discharge Planning:  Goal: Discharged to appropriate level of care  Description: Discharged to appropriate level of care  Outcome: Ongoing

## 2020-10-01 NOTE — PROGRESS NOTES
Pt arrived to floor from PACU at 1615 via stretcher. Pt oriented to room, call light, policies and procedures, the menu and ordering. Call light within reach. Bed in lowest position, bed alarm on, and wheels locked. Pt verbalized understanding. No complaints, questions or concerns at this time.   Electronically signed by Braden Barrow RN on 10/1/2020

## 2020-10-01 NOTE — PROGRESS NOTES
Hospitalist Progress Note      PCP: Shadia Gan MD    Date of Admission: 9/28/2020    Chief Complaint: Chest pain    Hospital Course: Patient is a 67-year-old  male who presented to the hospital with a 2 through the progressively worsening anterior chest pain along with back pain that is associated with shortness of breath. Patient has a history of severe peripheral vascular disease status post bilateral above-the-knee amputations, type 2 diabetes, hypertension and dyslipidemia. In the emergency department a chest x-ray was performed which showed a possible right hilar mass. CT of the chest was then performed which showed a large 8.3 cm right suprahilar upper lobe pulmonary mass with compression of the hilar and paratracheal nodes and narrowing of the mid SVC segment with evidence of some degree of SVC obstruction. There is also concern for a right-sided locally advanced bronchogenic cancer. Pulmonology was consulted and will likely need bronchoscopy with biopsy. Hematology oncology also consulted. Subjective: Patient denies shortness of breath chest pain nausea vomiting.   No acute events overnight reported, patient seen and evaluated at the bedside  Medications:  Reviewed    Infusion Medications   Scheduled Medications    oxyCODONE  10 mg Oral 2 times per day    ceFAZolin  2 g Intravenous 60 Min Pre-Op    nicotine  1 patch Transdermal Daily    cloNIDine  0.3 mg Oral TID    aspirin  325 mg Oral Daily    DULoxetine  60 mg Oral Daily    escitalopram  10 mg Oral Daily    hydrALAZINE  25 mg Oral TID    labetalol  300 mg Oral BID    rosuvastatin  5 mg Oral Nightly    sodium chloride flush  10 mL Intravenous 2 times per day    enoxaparin  40 mg Subcutaneous Daily    dexamethasone  10 mg Intravenous Q12H     PRN Meds: hydrALAZINE, morphine, sodium chloride flush, promethazine **OR** ondansetron, potassium chloride, magnesium sulfate      Intake/Output Summary (Last 24 hours) at 10/1/2020 1442  Last data filed at 10/1/2020 1406  Gross per 24 hour   Intake --   Output 5000 ml   Net -5000 ml       Physical Exam Performed:    /86   Pulse 75   Temp 97.8 °F (36.6 °C) (Oral)   Resp 16   Ht 6' (1.829 m)   Wt 185 lb 13.6 oz (84.3 kg)   SpO2 95%   BMI 25.21 kg/m²     General appearance: No apparent distress, appears stated age and cooperative. HEENT: Pupils equal, round, and reactive to light. Conjunctivae/corneas clear. Neck: Supple, with full range of motion. No jugular venous distention. Trachea midline. Respiratory:  Normal respiratory effort. Clear to auscultation, bilaterally without Rales/Wheezes/Rhonchi. Cardiovascular: Regular rate and rhythm with normal S1/S2  Abdomen: Soft, non-tender, non-distended with normal bowel sounds. Musculoskeletal: Bilateral AKA  Skin: Skin color, texture, turgor normal.  No rashes or lesions. Neurologic:  Neurovascularly intact without any focal sensory/motor deficits.  Cranial nerves: II-XII intact, grossly non-focal.  Psychiatric: Alert and oriented, thought content appropriate, normal insight  Capillary Refill: Brisk,< 3 seconds   Peripheral Pulses: +2 palpable upper extremity, equal bilaterally       Labs:   Recent Labs     09/28/20  1734   WBC 10.2   HGB 12.8*   HCT 37.5*        Recent Labs     09/28/20  1734   *   K 4.2   CL 93*   CO2 26   BUN 5*   CREATININE 0.6*   CALCIUM 9.2     Recent Labs     09/28/20  1734   AST 12*   ALT 12   BILITOT 0.3   ALKPHOS 127     Recent Labs     09/30/20  1421   INR 1.11     Recent Labs     09/28/20  1734   TROPONINI <0.01       Urinalysis:      Lab Results   Component Value Date    NITRU Negative 02/29/2020    WBCUA 1 02/29/2020    RBCUA 4 02/29/2020    BLOODU Negative 02/29/2020    SPECGRAV 1.016 02/29/2020    GLUCOSEU Negative 02/29/2020       Radiology:  CT ABDOMEN PELVIS W IV CONTRAST Additional Contrast? Oral   Final Result   No evidence of abdominopelvic metastatic disease         NM BONE SCAN WHOLE BODY   Final Result   Multifocal degenerative change, without a generalized scintigraphic pattern   of osseous metastatic disease. CT CHEST PULMONARY EMBOLISM W CONTRAST   Final Result   No evidence of pulmonary embolism. Large 8.3 cm right suprahilar/upper lobe pulmonic mass which is confluent   with enlarged right hilar and pretracheal lymph nodes. This is consistent   with bronchogenic carcinoma. The mass significantly narrows the traversing   mid SVC segment and the presence of fat infiltration of the mediastinum could   indicate some degree of SVC syndrome/obstruction. Trace bilateral pleural effusions. XR CHEST PORTABLE   Final Result   1. New right hilar/suprahilar mass. Recommend CT chest   2. Left pleural effusion         IR PORT PLACEMENT > 5 YEARS    (Results Pending)           Assessment/Plan:    Patient is a 55-year-old male who presented to hospital with 2-day history of progressive worsening anterior chest pain along with back pain associated with shortness of breath.   Patient has past medical history of PAD status post bilateral above-knee amputations, diabetes mellitus 2, hypertension, dyslipidemia    Assessment  Superior vena cava syndrome  Right hilar lung mass  Severe PAD status post bilateral AKA  Diabetes mellitus 2  Hypertension  Hyperlipidemia  Tobacco dependence    Plan  Patient had a EUS performed with positive diagnosis, patient will get port placement, lung mass is localized and no metastasis on bone scan or CT abdomen pelvis, pathology pending at this time  Continue oxygen support as needed  Insulin sliding scale  Continue home clonidine, hydralazine, labetalol  Continue aspirin, statin  Continue nicotine patch  Continue Decadron per hematology  DVT Prophylaxis: Lovenox  Diet: Diet NPO, After Midnight  Code Status: Full Code    PT/OT Eval Status: Not applicable    Dispo -continue inpatient today, likely discharge tomorrow pending final oncology plan    Timmy Barker MD

## 2020-10-01 NOTE — PROGRESS NOTES
pelvis 06/13/2018 HISTORY: ORDERING SYSTEM PROVIDED HISTORY: lung mass staging TECHNOLOGIST PROVIDED HISTORY: Reason for exam:->lung mass staging Additional Contrast?->Oral Reason for Exam: lung mass staging FINDINGS: Lower Chest: Known right hilar mass, incompletely imaged. Basilar right lower lobe atelectasis. Trace pleural effusions. Organs: Bilateral renal cysts up to 1 cm on the left. Tiny hepatic cysts up to 4 mm. No solid hepatic mass. Normal adrenal morphology. Spleen and pancreas unremarkable. Hepatic excreted contrast in the gallbladder GI/Bowel: No gastrointestinal abnormality. No mesenteric adenopathy Pelvis: Urinary bladder unremarkable. No pelvic adenopathy. Fat containing bilateral inguinal hernias Peritoneum/Retroperitoneum: No retroperitoneal adenopathy. No ascites or peritoneal tumor. Atherosclerosis of normal caliber abdominal aorta Bones/Soft Tissues: No suspicious bone lesion     No evidence of abdominopelvic metastatic disease     Xr Chest Portable    Result Date: 9/28/2020  EXAMINATION: ONE XRAY VIEW OF THE CHEST 9/28/2020 5:35 pm COMPARISON: 02/29/2020 HISTORY: ORDERING SYSTEM PROVIDED HISTORY: Chest pain TECHNOLOGIST PROVIDED HISTORY: Reason for exam:->Chest pain Reason for Exam: Chest Pain Acuity: Chronic Type of Exam: Ongoing FINDINGS: Heart size and pulmonary vessels within normal limits. Interval development of right hilar/suprahilar mass. Right lung clear with a sharp costophrenic angle. Blunting of the left costophrenic angle     1. New right hilar/suprahilar mass. Recommend CT chest 2. Left pleural effusion     Ct Chest Pulmonary Embolism W Contrast    Result Date: 9/29/2020  EXAMINATION: CTA OF THE CHEST 9/28/2020 6:38 pm TECHNIQUE: CTA of the chest was performed after the administration of intravenous contrast.  Multiplanar reformatted images are provided for review. MIP images are provided for review.  Dose modulation, iterative reconstruction, and/or weight based adjustment of the mA/kV was utilized to reduce the radiation dose to as low as reasonably achievable. COMPARISON: Same date chest radiograph HISTORY: ORDERING SYSTEM PROVIDED HISTORY: SOB suspect pulmonary embolus TECHNOLOGIST PROVIDED HISTORY: Reason for exam:->SOB suspect pulmonary embolus Reason for Exam: SOB suspect pulmonary embolus Acuity: Acute Type of Exam: Initial FINDINGS: Pulmonary Arteries: Pulmonary arteries are adequately opacified for evaluation. No evidence of intraluminal filling defect to suggest pulmonary embolism. Main pulmonary artery is normal in caliber. Mediastinum: CT confirms that there is right suprahilar conglomerate mian/pulmonic mass which results in obliteration of the traversing SVC segment and narrows the main right pulmonary artery. This also narrows the distal right mainstem bronchus. Exact measurement is difficult due to its configuration and confluent nature but it measures approximately 8.3 x 6.7 cm (series 2, image 187). Diffuse subcutaneous fat stranding of the mediastinum is felt to be related to some degree SVC syndrome/obstruction from the aforementioned mass. The heart and pericardium demonstrate no acute abnormality. There is no acute abnormality of the thoracic aorta. Lungs/pleura: Conglomerate left right upper/suprahilar pulmonic mass which is confluent with the large right hilar and pretracheal lymphadenopathy. There is postobstructive atelectasis of the right lung apex with interlobular septal thickening. Trace bilateral pleural effusions noted. Upper Abdomen: Subcentimeter low densities in the liver noted. No calcified gallstones. The adrenal glands are unremarkable. Moderate-severe atherosclerotic aortic disease which affects the origins of the great vessels and visceral vasculature. Soft Tissues/Bones: No acute bone or soft tissue abnormality. No evidence of pulmonary embolism.  Large 8.3 cm right suprahilar/upper lobe pulmonic mass which is confluent with enlarged right hilar and pretracheal lymph nodes. This is consistent with bronchogenic carcinoma. The mass significantly narrows the traversing mid SVC segment and the presence of fat infiltration of the mediastinum could indicate some degree of SVC syndrome/obstruction. Trace bilateral pleural effusions. Nm Bone Scan Whole Body    Result Date: 9/29/2020  EXAMINATION: WHOLE BODY BONE SCAN  9/29/2020 TECHNIQUE: The patient was injected intravenously with 29.3 mCi of 99 mTc MDP and scintigraphy of the entire skeleton was performed approximately three hours later. COMPARISON: Patient did not have previous imaging studies for comparison. HISTORY: ORDERING SYSTEM PROVIDED HISTORY: lung ca staging TECHNOLOGIST PROVIDED HISTORY: Reason for exam:->lung ca staging Reason for Exam: new dx lung Ca Acuity: Unknown Type of Exam: Initial Relevant Medical/Surgical History: Bilateral leg amputations FINDINGS: Patient is status post amputation of the lower extremities at the level of the mid thighs. Diffuse activity seen throughout the spine, without singular level of intense activity, likely degenerative. Mild activity at bilateral shoulders, sternoclavicular joints, hips, likely degenerative. Paranasal activity, typically on the basis of sinus disease or healing trauma. Physiologic activity is seen within the kidneys. Bladder has been masked, obscuring the central pelvis. Multifocal degenerative change, without a generalized scintigraphic pattern of osseous metastatic disease.        Problem List  Patient Active Problem List   Diagnosis    Type 2 diabetes mellitus with circulatory disorder ([address]    Mixed hyperlipidemia    Essential hypertension    Phantom limb syndrome with pain (HCC)    Tobacco abuse    History of substance abuse ([address]    Chronic pain syndrome    Lumbosacral spondylosis without myelopathy    Superior vena cava compression syndrome    Mediastinal mass       ASSESSMENT AND PLAN    Lung mass  Small cell  He needs a port and  Needs to start chemo asap  PT refusing port until he gets his check book,  His phone-and -  He wants to call his family -he does not have their numbers --  I asked if we caould call his facility to get numbers from there and he is not accepting this becuas he wants his check book too  I informed richard of the issue  I placed order for social work or nursing to request a courrier to get it? He is on at 2  Pt refuses procedure until this occurs  I really dont advise to leave ama. ..   Aym Obregon MD

## 2020-10-01 NOTE — CARE COORDINATION
10/1 JEFERSON called Roger Catalan at [phone] regarding patient's wishes for his cell phone and check book to be brought to him. Adalid Stewart will bring these items to the patient after her morning meeting at approximately 11 AM. Call placed to patient to notify. Electronically signed by Pramod Hooker RN on 10/1/2020 at 8:54 AM

## 2020-10-01 NOTE — PLAN OF CARE
Problem: Skin Integrity:  Goal: Will show no infection signs and symptoms  Description: Will show no infection signs and symptoms  10/1/2020 0934 by Jesenia Hoskins RN  Outcome: Ongoing  10/1/2020 0313 by Loretta Christianson RN  Outcome: Ongoing  10/1/2020 0034 by Marla Hansen RN  Outcome: Ongoing  Goal: Absence of new skin breakdown  Description: Absence of new skin breakdown  10/1/2020 0934 by Jesenia Hoskins RN  Outcome: Ongoing  10/1/2020 0313 by Loretta Christianson RN  Outcome: Ongoing  10/1/2020 0034 by Marla Hansen RN  Outcome: Ongoing     Problem: Pain:  Goal: Pain level will decrease  Description: Pain level will decrease  10/1/2020 0934 by Jesenia Hosknis RN  Outcome: Ongoing  10/1/2020 0313 by Loretta Christianson RN  Outcome: Ongoing  10/1/2020 0034 by Marla Hansen RN  Outcome: Ongoing  Goal: Control of acute pain  Description: Control of acute pain  10/1/2020 0934 by Jesenia Hoskins RN  Outcome: Ongoing  10/1/2020 0313 by Loretta Christianson RN  Outcome: Ongoing  10/1/2020 0034 by Marla Hansen RN  Outcome: Ongoing  Goal: Control of chronic pain  Description: Control of chronic pain  10/1/2020 0934 by Jesenia Hoskins RN  Outcome: Ongoing  10/1/2020 0313 by Loretta Christianson RN  Outcome: Ongoing  10/1/2020 0034 by Marla Hansen RN  Outcome: Ongoing     Problem: Gas Exchange - Impaired:  Goal: Levels of oxygenation will improve  Description: Levels of oxygenation will improve  10/1/2020 0934 by Jesenia Hoskins RN  Outcome: Ongoing  10/1/2020 0313 by Loretta Christianson RN  Outcome: Ongoing  10/1/2020 0034 by Marla Hansen RN  Outcome: Ongoing     Problem: Cardiac:  Goal: Ability to maintain vital signs within normal range will improve  Description: Ability to maintain vital signs within normal range will improve  10/1/2020 0934 by Jesenia Hoskins RN  Outcome: Ongoing  10/1/2020 0313 by Loretta Christianson RN  Outcome: Ongoing  10/1/2020 0034 by Marla Hansen RN  Outcome: Ongoing  Goal: Cardiovascular alteration will improve  Description: Cardiovascular alteration will improve  10/1/2020 0934 by Adrian Merritt RN  Outcome: Ongoing  10/1/2020 0313 by Nuria Damon RN  Outcome: Ongoing  10/1/2020 0034 by Stevie White RN  Outcome: Ongoing     Problem: Health Behavior:  Goal: Will modify at least one risk factor affecting health status  Description: Will modify at least one risk factor affecting health status  10/1/2020 0934 by Adrian Merritt RN  Outcome: Ongoing  10/1/2020 0313 by Nuria Damon RN  Outcome: Ongoing  10/1/2020 0034 by Stevie White RN  Outcome: Ongoing  Goal: Identification of resources available to assist in meeting health care needs will improve  Description: Identification of resources available to assist in meeting health care needs will improve  10/1/2020 0934 by Adrian Merritt RN  Outcome: Ongoing  10/1/2020 0313 by Nuria Damon RN  Outcome: Ongoing  10/1/2020 0034 by Stevie White RN  Outcome: Ongoing     Problem: Physical Regulation:  Goal: Complications related to the disease process, condition or treatment will be avoided or minimized  Description: Complications related to the disease process, condition or treatment will be avoided or minimized  10/1/2020 0934 by Adrian Merritt RN  Outcome: Ongoing  10/1/2020 0313 by Nuria Damon RN  Outcome: Ongoing  10/1/2020 0034 by Stevie White RN  Outcome: Ongoing     Problem: Discharge Planning:  Goal: Discharged to appropriate level of care  Description: Discharged to appropriate level of care  10/1/2020 0934 by Adrian Merritt RN  Outcome: Ongoing  10/1/2020 0313 by Nuria Damon RN  Outcome: Ongoing

## 2020-10-01 NOTE — OP NOTE
Brief Postoperative Note    Raymond Montoya  YOB: 1960  [id number]    Pre-operative Diagnosis: small cell lung cancer    Post-operative Diagnosis: Same    Procedure: RIJ power port    Anesthesia: Local    Surgeons/Assistants: Dr. Candice Bardales    Estimated Blood Loss: less than 5ml     Complications: None    Specimens: Was Not Obtained    Findings: RIJ power port with tip in the right atrium. Aspirated, flushed and locked with heparin. oKay to use.     Electronically signed by Dami Mazariegos MD on 10/1/2020 at 4:00 PM

## 2020-10-01 NOTE — PROGRESS NOTES
ebus with positive diagnosis. No other pulmonary issues. No complication. On room air. Will sign off at this time. Thanks for the consult. Please call with questions.      Trino Alvarez [facility]  [phone]

## 2020-10-01 NOTE — PROGRESS NOTES
4 Eyes Admission Assessment     I agree as the admission nurse that 2 RN's have performed a thorough Head to Toe Skin Assessment on the patient. ALL assessment sites listed below have been assessed on admission. Areas assessed by both nurses: yes  [x]   Head, Face, and Ears   [x]   Shoulders, Back, and Chest  [x]   Arms, Elbows, and Hands   [x]   Coccyx, Sacrum, and Ischum  [x]   Legs, Feet, and Heels        Does the Patient have Skin Breakdown?   No         Brayan Prevention initiated:  No   Wound Care Orders initiated:  No      [facility] nurse consulted for Pressure Injury (Stage 3,4, Unstageable, DTI, NWPT, and Complex wounds):  No      Nurse 1 eSignature: Electronically signed by Elvan Landau, RN on 10/1/2020       **SHARE this note so that the co-signing nurse is able to place an eSignature**    Nurse 2 eSignature: Electronically signed by Yanique Gonzales RN on 10/1/20 at 5:59 PM EDT

## 2020-10-01 NOTE — ACP (ADVANCE CARE PLANNING)
ADVANCED CARE PLANNING    Name:Blake Adan       :  1960              MRN:  [id number]    Purpose of Encounter: Advanced care planning in light of acute and chronic deteriorating medical conditions. Parties in attendance: :Selam Pitts MD ( myself )    Decisional Capacity: YES      Subjective: Patient/family understand in this voluntary conversation what inteventions and plans patient would want implemented in light of the following diagnoses:    Diagnosis: Severe PAD status post bilateral above-knee amputation, right lung mass, suspicious for lung cancer    Discussion highlights: I discussed with patient advanced directives or impending END of life event in the light of above diagnosis, we discussed the needs for possible CPR, intubation/ventilator support if needed. In such an event patient wishes to remain Full Code. Goals of Care Determinations: Patient wishes Full Code but has interest in continuing this conversation, and discussing with family before making any changes to code status and goals of care.      Code Status: Full Code    Time Spent: 17 minutes    Electronically signed by Mira Bravo MD on 10/1/2020 at 2:44 PM

## 2020-10-02 LAB
ANION GAP SERPL CALCULATED.3IONS-SCNC: 10 MMOL/L (ref 3–16)
BUN BLDV-MCNC: 9 MG/DL (ref 7–20)
CALCIUM SERPL-MCNC: 8.9 MG/DL (ref 8.3–10.6)
CHLORIDE BLD-SCNC: 99 MMOL/L (ref 99–110)
CO2: 26 MMOL/L (ref 21–32)
CREAT SERPL-MCNC: <0.5 MG/DL (ref 0.8–1.3)
GFR AFRICAN AMERICAN: >60
GFR NON-AFRICAN AMERICAN: >60
GLUCOSE BLD-MCNC: 104 MG/DL (ref 70–99)
HCT VFR BLD CALC: 36 % (ref 40.5–52.5)
HEMOGLOBIN: 12.3 G/DL (ref 13.5–17.5)
MCH RBC QN AUTO: 30.3 PG (ref 26–34)
MCHC RBC AUTO-ENTMCNC: 34.2 G/DL (ref 31–36)
MCV RBC AUTO: 88.6 FL (ref 80–100)
PDW BLD-RTO: 14.2 % (ref 12.4–15.4)
PLATELET # BLD: 380 K/UL (ref 135–450)
PMV BLD AUTO: 6.5 FL (ref 5–10.5)
POTASSIUM REFLEX MAGNESIUM: 4.1 MMOL/L (ref 3.5–5.1)
RBC # BLD: 4.07 M/UL (ref 4.2–5.9)
SODIUM BLD-SCNC: 135 MMOL/L (ref 136–145)
WBC # BLD: 7.2 K/UL (ref 4–11)

## 2020-10-02 PROCEDURE — 85027 COMPLETE CBC AUTOMATED: CPT

## 2020-10-02 PROCEDURE — 6360000002 HC RX W HCPCS: Performed by: INTERNAL MEDICINE

## 2020-10-02 PROCEDURE — 6370000000 HC RX 637 (ALT 250 FOR IP): Performed by: INTERNAL MEDICINE

## 2020-10-02 PROCEDURE — 36592 COLLECT BLOOD FROM PICC: CPT

## 2020-10-02 PROCEDURE — 76937 US GUIDE VASCULAR ACCESS: CPT

## 2020-10-02 PROCEDURE — 36561 INSERT TUNNELED CV CATH: CPT

## 2020-10-02 PROCEDURE — 94761 N-INVAS EAR/PLS OXIMETRY MLT: CPT

## 2020-10-02 PROCEDURE — 2700000000 HC OXYGEN THERAPY PER DAY

## 2020-10-02 PROCEDURE — 80048 BASIC METABOLIC PNL TOTAL CA: CPT

## 2020-10-02 PROCEDURE — 99153 MOD SED SAME PHYS/QHP EA: CPT

## 2020-10-02 PROCEDURE — 2580000003 HC RX 258: Performed by: INTERNAL MEDICINE

## 2020-10-02 PROCEDURE — 36415 COLL VENOUS BLD VENIPUNCTURE: CPT

## 2020-10-02 PROCEDURE — 77001 FLUOROGUIDE FOR VEIN DEVICE: CPT

## 2020-10-02 PROCEDURE — 1200000000 HC SEMI PRIVATE

## 2020-10-02 PROCEDURE — 99152 MOD SED SAME PHYS/QHP 5/>YRS: CPT

## 2020-10-02 RX ORDER — ALLOPURINOL 300 MG/1
300 TABLET ORAL DAILY
Status: DISCONTINUED | OUTPATIENT
Start: 2020-10-02 | End: 2020-10-04 | Stop reason: HOSPADM

## 2020-10-02 RX ORDER — SODIUM CHLORIDE 9 MG/ML
INJECTION, SOLUTION INTRAVENOUS CONTINUOUS
Status: DISCONTINUED | OUTPATIENT
Start: 2020-10-02 | End: 2020-10-04

## 2020-10-02 RX ADMIN — ALLOPURINOL 300 MG: 300 TABLET ORAL at 08:36

## 2020-10-02 RX ADMIN — LABETALOL HYDROCHLORIDE 300 MG: 200 TABLET, FILM COATED ORAL at 20:01

## 2020-10-02 RX ADMIN — MORPHINE SULFATE 4 MG: 4 INJECTION, SOLUTION INTRAMUSCULAR; INTRAVENOUS at 08:37

## 2020-10-02 RX ADMIN — CLONIDINE HYDROCHLORIDE 0.3 MG: 0.1 TABLET ORAL at 08:36

## 2020-10-02 RX ADMIN — HYDRALAZINE HYDROCHLORIDE 25 MG: 25 TABLET, FILM COATED ORAL at 14:53

## 2020-10-02 RX ADMIN — LABETALOL HYDROCHLORIDE 300 MG: 200 TABLET, FILM COATED ORAL at 08:36

## 2020-10-02 RX ADMIN — SODIUM CHLORIDE: 9 INJECTION, SOLUTION INTRAVENOUS at 08:52

## 2020-10-02 RX ADMIN — CLONIDINE HYDROCHLORIDE 0.3 MG: 0.1 TABLET ORAL at 20:00

## 2020-10-02 RX ADMIN — MORPHINE SULFATE 4 MG: 4 INJECTION, SOLUTION INTRAMUSCULAR; INTRAVENOUS at 22:25

## 2020-10-02 RX ADMIN — DEXAMETHASONE SODIUM PHOSPHATE: 4 INJECTION, SOLUTION INTRA-ARTICULAR; INTRALESIONAL; INTRAMUSCULAR; INTRAVENOUS; SOFT TISSUE at 13:37

## 2020-10-02 RX ADMIN — MORPHINE SULFATE 4 MG: 4 INJECTION, SOLUTION INTRAMUSCULAR; INTRAVENOUS at 14:53

## 2020-10-02 RX ADMIN — MORPHINE SULFATE 4 MG: 4 INJECTION, SOLUTION INTRAMUSCULAR; INTRAVENOUS at 02:59

## 2020-10-02 RX ADMIN — OXYCODONE HYDROCHLORIDE 10 MG: 10 TABLET, FILM COATED, EXTENDED RELEASE ORAL at 09:45

## 2020-10-02 RX ADMIN — ROSUVASTATIN CALCIUM 5 MG: 10 TABLET, FILM COATED ORAL at 20:00

## 2020-10-02 RX ADMIN — HYDRALAZINE HYDROCHLORIDE 25 MG: 25 TABLET, FILM COATED ORAL at 20:01

## 2020-10-02 RX ADMIN — MORPHINE SULFATE 4 MG: 4 INJECTION, SOLUTION INTRAMUSCULAR; INTRAVENOUS at 19:57

## 2020-10-02 RX ADMIN — SODIUM CHLORIDE, PRESERVATIVE FREE 10 ML: 5 INJECTION INTRAVENOUS at 20:17

## 2020-10-02 RX ADMIN — ETOPOSIDE 126 MG: 20 INJECTION INTRAVENOUS at 15:49

## 2020-10-02 RX ADMIN — ESCITALOPRAM OXALATE 10 MG: 10 TABLET ORAL at 08:37

## 2020-10-02 RX ADMIN — SODIUM CHLORIDE, PRESERVATIVE FREE 10 ML: 5 INJECTION INTRAVENOUS at 08:45

## 2020-10-02 RX ADMIN — SODIUM CHLORIDE 150 MG: 9 INJECTION, SOLUTION INTRAVENOUS at 14:19

## 2020-10-02 RX ADMIN — CARBOPLATIN 590 MG: 10 INJECTION, SOLUTION INTRAVENOUS at 15:07

## 2020-10-02 RX ADMIN — HYDRALAZINE HYDROCHLORIDE 25 MG: 25 TABLET, FILM COATED ORAL at 08:36

## 2020-10-02 RX ADMIN — MORPHINE SULFATE 4 MG: 4 INJECTION, SOLUTION INTRAMUSCULAR; INTRAVENOUS at 16:55

## 2020-10-02 RX ADMIN — ASPIRIN 325 MG ORAL TABLET 325 MG: 325 PILL ORAL at 08:36

## 2020-10-02 RX ADMIN — HYDRALAZINE HYDROCHLORIDE 10 MG: 20 INJECTION INTRAMUSCULAR; INTRAVENOUS at 18:16

## 2020-10-02 RX ADMIN — MORPHINE SULFATE 4 MG: 4 INJECTION, SOLUTION INTRAMUSCULAR; INTRAVENOUS at 11:58

## 2020-10-02 RX ADMIN — ENOXAPARIN SODIUM 40 MG: 40 INJECTION SUBCUTANEOUS at 08:36

## 2020-10-02 RX ADMIN — DULOXETINE HYDROCHLORIDE 60 MG: 60 CAPSULE, DELAYED RELEASE ORAL at 08:36

## 2020-10-02 RX ADMIN — CLONIDINE HYDROCHLORIDE 0.3 MG: 0.1 TABLET ORAL at 14:53

## 2020-10-02 RX ADMIN — OXYCODONE HYDROCHLORIDE 10 MG: 10 TABLET, FILM COATED, EXTENDED RELEASE ORAL at 21:30

## 2020-10-02 ASSESSMENT — PAIN DESCRIPTION - ONSET
ONSET: ON-GOING

## 2020-10-02 ASSESSMENT — PAIN DESCRIPTION - PROGRESSION
CLINICAL_PROGRESSION: NOT CHANGED

## 2020-10-02 ASSESSMENT — PAIN DESCRIPTION - FREQUENCY
FREQUENCY: CONTINUOUS

## 2020-10-02 ASSESSMENT — PAIN - FUNCTIONAL ASSESSMENT
PAIN_FUNCTIONAL_ASSESSMENT: PREVENTS OR INTERFERES SOME ACTIVE ACTIVITIES AND ADLS

## 2020-10-02 ASSESSMENT — PAIN DESCRIPTION - PAIN TYPE
TYPE: ACUTE PAIN
TYPE: PHANTOM PAIN;ACUTE PAIN
TYPE: ACUTE PAIN
TYPE: ACUTE PAIN
TYPE: ACUTE PAIN;PHANTOM PAIN
TYPE: ACUTE PAIN

## 2020-10-02 ASSESSMENT — PAIN DESCRIPTION - LOCATION
LOCATION: GENERALIZED;LEG
LOCATION: GENERALIZED
LOCATION: GENERALIZED;LEG
LOCATION: GENERALIZED
LOCATION: GENERALIZED;LEG
LOCATION: GENERALIZED
LOCATION: GENERALIZED

## 2020-10-02 ASSESSMENT — PAIN DESCRIPTION - ORIENTATION
ORIENTATION: RIGHT;LEFT
ORIENTATION: MID
ORIENTATION: MID
ORIENTATION: RIGHT;LEFT
ORIENTATION: RIGHT;LEFT

## 2020-10-02 ASSESSMENT — PAIN SCALES - GENERAL
PAINLEVEL_OUTOF10: 9
PAINLEVEL_OUTOF10: 7
PAINLEVEL_OUTOF10: 7
PAINLEVEL_OUTOF10: 8
PAINLEVEL_OUTOF10: 0
PAINLEVEL_OUTOF10: 8
PAINLEVEL_OUTOF10: 6
PAINLEVEL_OUTOF10: 10
PAINLEVEL_OUTOF10: 8
PAINLEVEL_OUTOF10: 7
PAINLEVEL_OUTOF10: 5
PAINLEVEL_OUTOF10: 0
PAINLEVEL_OUTOF10: 4
PAINLEVEL_OUTOF10: 8

## 2020-10-02 ASSESSMENT — PAIN SCALES - WONG BAKER
WONGBAKER_NUMERICALRESPONSE: 6
WONGBAKER_NUMERICALRESPONSE: 0
WONGBAKER_NUMERICALRESPONSE: 8
WONGBAKER_NUMERICALRESPONSE: 8
WONGBAKER_NUMERICALRESPONSE: 4
WONGBAKER_NUMERICALRESPONSE: 4

## 2020-10-02 ASSESSMENT — PAIN DESCRIPTION - DESCRIPTORS
DESCRIPTORS: ACHING
DESCRIPTORS: ACHING;BURNING
DESCRIPTORS: ACHING
DESCRIPTORS: ACHING;BURNING
DESCRIPTORS: ACHING
DESCRIPTORS: ACHING;BURNING

## 2020-10-02 ASSESSMENT — PAIN DESCRIPTION - DIRECTION
RADIATING_TOWARDS: MID

## 2020-10-02 NOTE — PROGRESS NOTES
[facility]  Oncology Hematology Care  Progress Note      SUBJECTIVE:   PT  Had port placed  He is ok to take chemo   I explained sside effects of chemo carbo etoposide-nv hair loss low counts infections transfusions tumor lysis fatigue taste loss diarrhea constipation   He will need tx until Sunday   HE gets Roma etop today and etop sat sun   Will consider neualsta as outpt on Monday if dc if here Monday will give neupogen while still here     OBJECTIVE      Medications    Current Facility-Administered Medications: 0.9 % sodium chloride infusion, , Intravenous, Continuous  allopurinol (ZYLOPRIM) tablet 300 mg, 300 mg, Oral, Daily  hydrALAZINE (APRESOLINE) injection 10 mg, 10 mg, Intravenous, Q6H PRN  etoposide (VEPESID) 166 mg in sodium chloride 0.9 % 500 mL chemo IVPB, 80 mg/m2, Intravenous, Q24H  CARBOplatin (PARAPLATIN) 700 mg in sodium chloride 0.9 % 100 mL chemo IVPB, 700 mg, Intravenous, Once  ondansetron (ZOFRAN) 16 mg in dextrose 5 % 50 mL IVPB, 16 mg, Intravenous, Daily  dexamethasone (DECADRON) injection 8 mg, 8 mg, Intravenous, Daily  oxyCODONE (OXYCONTIN) extended release tablet 10 mg, 10 mg, Oral, 2 times per day  ceFAZolin (ANCEF) 2 g in dextrose 5 % 100 mL IVPB, 2 g, Intravenous, 60 Min Pre-Op  nicotine (NICODERM CQ) 14 MG/24HR 1 patch, 1 patch, Transdermal, Daily  morphine (PF) injection 4 mg, 4 mg, Intravenous, Q2H PRN  cloNIDine (CATAPRES) tablet 0.3 mg, 0.3 mg, Oral, TID  aspirin tablet 325 mg, 325 mg, Oral, Daily  DULoxetine (CYMBALTA) extended release capsule 60 mg, 60 mg, Oral, Daily  escitalopram (LEXAPRO) tablet 10 mg, 10 mg, Oral, Daily  hydrALAZINE (APRESOLINE) tablet 25 mg, 25 mg, Oral, TID  labetalol (NORMODYNE) tablet 300 mg, 300 mg, Oral, BID  rosuvastatin (CRESTOR) tablet 5 mg, 5 mg, Oral, Nightly  sodium chloride flush 0.9 % injection 10 mL, 10 mL, Intravenous, 2 times per day  sodium chloride flush 0.9 % injection 10 mL, 10 mL, Intravenous, PRN  promethazine (PHENERGAN) tablet 12.5 mg, 12.5 mg, Oral, Q6H PRN **OR** ondansetron (ZOFRAN) injection 4 mg, 4 mg, Intravenous, Q6H PRN  enoxaparin (LOVENOX) injection 40 mg, 40 mg, Subcutaneous, Daily  potassium chloride 10 mEq/100 mL IVPB (Peripheral Line), 10 mEq, Intravenous, PRN  magnesium sulfate 2 g in 50 mL IVPB premix, 2 g, Intravenous, PRN  Physical    VITALS:  BP (!) 160/84   Pulse 77   Temp 97.5 °F (36.4 °C) (Oral)   Resp 15   Ht 6' (1.829 m)   Wt 186 lb 4.6 oz (84.5 kg)   SpO2 92%   BMI 25.27 kg/m²   TEMPERATURE:  Current - Temp: 97.5 °F (36.4 °C); Max - Temp  Av °F (36.7 °C)  Min: 97.5 °F (36.4 °C)  Max: 98.7 °F (37.1 °C)  PULSE OXIMETRY RANGE: SpO2  Av %  Min: 91 %  Max: 97 %  24HR INTAKE/OUTPUT:      Intake/Output Summary (Last 24 hours) at 10/2/2020 0751  Last data filed at 10/2/2020 8396  Gross per 24 hour   Intake --   Output 3850 ml   Net -3850 ml       CONSTITUTIONAL:  awake, alert, cooperative, no apparent distress, HEENT oral pharynx , no scleral icterus  HEMATOLOGIC/LYMPHATICS:  no cervical lymphadenopathy, no supraclavicular lymphadenopathy, LUNGS:  No increased work of breathing, good air exchange, clear to auscultation bilaterally, no crackles or wheezing  CARDIOVASCULAR:  , regular rate and rhythm, normal S1 and S2, no S3 or S4, and no murmur noted  ABDOMEN:  No scars, normal bowel sounds, soft, non-distended, non-tender, no masses palpated, no hepatosplenomegally  MUSCULOSKELETAL:  There is no redness, warmth, or swelling of the joints. EXTREMETIES: bilateral above amputation     Data      Recent Labs     10/01/20  1759 10/02/20  0532   WBC 7.7 7.2   HGB 12.6* 12.3*   HCT 37.7* 36.0*    380   MCV 89.9 88.6        Recent Labs     10/01/20  1759 10/02/20  0532   * 135*   K 4.3 4.1   CL 97* 99   CO2 21 26   BUN 10 9   CREATININE <0.5* <0.5*     No results for input(s): AST, ALT, ALB, BILIDIR, BILITOT, ALKPHOS in the last 72 hours.     Magnesium:    Lab Results   Component Value Date    MG 2.20 02/29/2020       Imaging Ct Abdomen Pelvis W Iv Contrast Additional Contrast? Oral    Result Date: 9/29/2020  EXAMINATION: CT OF THE ABDOMEN AND PELVIS WITH CONTRAST 9/29/2020 6:03 pm TECHNIQUE: CT of the abdomen and pelvis was performed with the administration of intravenous contrast. Multiplanar reformatted images are provided for review. Dose modulation, iterative reconstruction, and/or weight based adjustment of the mA/kV was utilized to reduce the radiation dose to as low as reasonably achievable. COMPARISON: CT chest 09/28/2020, CT abdomen and pelvis 06/13/2018 HISTORY: ORDERING SYSTEM PROVIDED HISTORY: lung mass staging TECHNOLOGIST PROVIDED HISTORY: Reason for exam:->lung mass staging Additional Contrast?->Oral Reason for Exam: lung mass staging FINDINGS: Lower Chest: Known right hilar mass, incompletely imaged. Basilar right lower lobe atelectasis. Trace pleural effusions. Organs: Bilateral renal cysts up to 1 cm on the left. Tiny hepatic cysts up to 4 mm. No solid hepatic mass. Normal adrenal morphology. Spleen and pancreas unremarkable. Hepatic excreted contrast in the gallbladder GI/Bowel: No gastrointestinal abnormality. No mesenteric adenopathy Pelvis: Urinary bladder unremarkable. No pelvic adenopathy. Fat containing bilateral inguinal hernias Peritoneum/Retroperitoneum: No retroperitoneal adenopathy. No ascites or peritoneal tumor. Atherosclerosis of normal caliber abdominal aorta Bones/Soft Tissues: No suspicious bone lesion     No evidence of abdominopelvic metastatic disease     Xr Chest Portable    Result Date: 9/28/2020  EXAMINATION: ONE XRAY VIEW OF THE CHEST 9/28/2020 5:35 pm COMPARISON: 02/29/2020 HISTORY: ORDERING SYSTEM PROVIDED HISTORY: Chest pain TECHNOLOGIST PROVIDED HISTORY: Reason for exam:->Chest pain Reason for Exam: Chest Pain Acuity: Chronic Type of Exam: Ongoing FINDINGS: Heart size and pulmonary vessels within normal limits.   Interval development of right hilar/suprahilar mass. Right lung clear with a sharp costophrenic angle. Blunting of the left costophrenic angle     1. New right hilar/suprahilar mass. Recommend CT chest 2. Left pleural effusion     Ct Chest Pulmonary Embolism W Contrast    Result Date: 9/29/2020  EXAMINATION: CTA OF THE CHEST 9/28/2020 6:38 pm TECHNIQUE: CTA of the chest was performed after the administration of intravenous contrast.  Multiplanar reformatted images are provided for review. MIP images are provided for review. Dose modulation, iterative reconstruction, and/or weight based adjustment of the mA/kV was utilized to reduce the radiation dose to as low as reasonably achievable. COMPARISON: Same date chest radiograph HISTORY: ORDERING SYSTEM PROVIDED HISTORY: SOB suspect pulmonary embolus TECHNOLOGIST PROVIDED HISTORY: Reason for exam:->SOB suspect pulmonary embolus Reason for Exam: SOB suspect pulmonary embolus Acuity: Acute Type of Exam: Initial FINDINGS: Pulmonary Arteries: Pulmonary arteries are adequately opacified for evaluation. No evidence of intraluminal filling defect to suggest pulmonary embolism. Main pulmonary artery is normal in caliber. Mediastinum: CT confirms that there is right suprahilar conglomerate mian/pulmonic mass which results in obliteration of the traversing SVC segment and narrows the main right pulmonary artery. This also narrows the distal right mainstem bronchus. Exact measurement is difficult due to its configuration and confluent nature but it measures approximately 8.3 x 6.7 cm (series 2, image 187). Diffuse subcutaneous fat stranding of the mediastinum is felt to be related to some degree SVC syndrome/obstruction from the aforementioned mass. The heart and pericardium demonstrate no acute abnormality. There is no acute abnormality of the thoracic aorta.  Lungs/pleura: Conglomerate left right upper/suprahilar pulmonic mass which is confluent with the large right hilar and pretracheal the central pelvis. Multifocal degenerative change, without a generalized scintigraphic pattern of osseous metastatic disease.        Problem List  Patient Active Problem List   Diagnosis    Type 2 diabetes mellitus with circulatory disorder ([address]    Mixed hyperlipidemia    Essential hypertension    Phantom limb syndrome with pain (HCC)    Tobacco abuse    History of substance abuse ([facility] [address]    Chronic pain syndrome    Lumbosacral spondylosis without myelopathy    Superior vena cava compression syndrome    Mediastinal mass       ASSESSMENT AND PLAN    Lung mass  Small cell  Port yesterday   Carbo etoposide day 1 today   Tumor  Lysis q12 hours until Sunday   Iv fluids and alloupurinol on board  After cycle 1 will do mri brain(he can t do it now) and if limited stg will incorporate radiation with either cycle 2 or 3-dependin as outpt   Second cycle in 3 weeks  IF he has limited stage his cure rate is 20%   His chance of upfront response is high at prob 75% with fast relief of svc sympmtoms   Edwin Yates MD

## 2020-10-02 NOTE — PROGRESS NOTES
mg Oral Nightly    sodium chloride flush  10 mL Intravenous 2 times per day    enoxaparin  40 mg Subcutaneous Daily     PRN Meds: hydrALAZINE, morphine, sodium chloride flush, promethazine **OR** ondansetron, potassium chloride, magnesium sulfate      Intake/Output Summary (Last 24 hours) at 10/2/2020 1448  Last data filed at 10/2/2020 0855  Gross per 24 hour   Intake --   Output 3000 ml   Net -3000 ml       Physical Exam Performed:    BP (!) 147/68   Pulse 82   Temp 97.5 °F (36.4 °C) (Oral)   Resp 20   Ht 3' 6\" (1.067 m)   Wt 183 lb 10.3 oz (83.3 kg)   SpO2 95%   BMI 73.19 kg/m²     General appearance: No apparent distress, appears stated age and cooperative. HEENT: Pupils equal, round, and reactive to light. Conjunctivae/corneas clear. Neck: Supple, with full range of motion. No jugular venous distention. Trachea midline. Respiratory:  Normal respiratory effort. Clear to auscultation, bilaterally without Rales/Wheezes/Rhonchi. Cardiovascular: Regular rate and rhythm with normal S1/S2  Abdomen: Soft, non-tender, non-distended with normal bowel sounds. Musculoskeletal: Bilateral AKA  Skin: Skin color, texture, turgor normal.  No rashes or lesions. Neurologic:  Neurovascularly intact without any focal sensory/motor deficits. Cranial nerves: II-XII intact, grossly non-focal.  Psychiatric: Alert and oriented, thought content appropriate, normal insight  Capillary Refill: Brisk,< 3 seconds   Peripheral Pulses: +2 palpable upper extremity, equal bilaterally       Labs:   Recent Labs     10/01/20  1759 10/02/20  0532   WBC 7.7 7.2   HGB 12.6* 12.3*   HCT 37.7* 36.0*    380     Recent Labs     10/01/20  1759 10/02/20  0532   * 135*   K 4.3 4.1   CL 97* 99   CO2 21 26   BUN 10 9   CREATININE <0.5* <0.5*   CALCIUM 8.9 8.9     No results for input(s): AST, ALT, BILIDIR, BILITOT, ALKPHOS in the last 72 hours.   Recent Labs     09/30/20  1421   INR 1.11     No results for input(s): Adrian Thompson patient had port placed yesterday, plan for starting chemotherapy today per oncology  Continue oxygen support as needed  Insulin sliding scale  Continue home clonidine, hydralazine, labetalol  Continue aspirin, statin  Continue nicotine patch  Continue Decadron per hematology  DVT Prophylaxis: Lovenox  Diet: DIET GENERAL;  Code Status: Full Code    PT/OT Eval Status: Not applicable    Dispo -continue inpatient today, oncology recommends to discharge patient on Monday after initial doses of chemotherapy    Judy Antonio MD

## 2020-10-02 NOTE — PROGRESS NOTES
Prn pain medication given as ordered. Will continue to monitor and assess . Patient on room air. Blood pressure 125/72, pulse 65, temperature 98.7 °F (37.1 °C), temperature source Oral, resp. rate 19, height 6' (1.829 m), weight 185 lb 13.6 oz (84.3 kg), SpO2 91 %.    Electronically signed by Jessica Sanchez RN on 10/2/2020 at 2:20 AM

## 2020-10-02 NOTE — PLAN OF CARE
Problem: Skin Integrity:  Goal: Will show no infection signs and symptoms  Description: Will show no infection signs and symptoms  Outcome: Ongoing  Goal: Absence of new skin breakdown  Description: Absence of new skin breakdown  Outcome: Ongoing     Problem: Pain:  Goal: Pain level will decrease  Description: Pain level will decrease  Outcome: Ongoing  Goal: Control of acute pain  Description: Control of acute pain  Outcome: Ongoing  Goal: Control of chronic pain  Description: Control of chronic pain  Outcome: Ongoing     Problem: Gas Exchange - Impaired:  Goal: Levels of oxygenation will improve  Description: Levels of oxygenation will improve  Outcome: Ongoing     Problem: Cardiac:  Goal: Ability to maintain vital signs within normal range will improve  Description: Ability to maintain vital signs within normal range will improve  Outcome: Ongoing  Goal: Cardiovascular alteration will improve  Description: Cardiovascular alteration will improve  Outcome: Ongoing     Problem: Health Behavior:  Goal: Will modify at least one risk factor affecting health status  Description: Will modify at least one risk factor affecting health status  Outcome: Ongoing  Goal: Identification of resources available to assist in meeting health care needs will improve  Description: Identification of resources available to assist in meeting health care needs will improve  Outcome: Ongoing     Problem: Physical Regulation:  Goal: Complications related to the disease process, condition or treatment will be avoided or minimized  Description: Complications related to the disease process, condition or treatment will be avoided or minimized  Outcome: Ongoing     Problem: Discharge Planning:  Goal: Discharged to appropriate level of care  Description: Discharged to appropriate level of care  Outcome: Ongoing

## 2020-10-02 NOTE — PROGRESS NOTES
Chemo orders placed to start Friday   He will finish Sunday   Im opting for Roma etoposide -  Will discuss side effects with pt in the am-  HE can be dc once chemo is finished

## 2020-10-02 NOTE — CARE COORDINATION
Sw spoke with patient today and provided patient with IMM letter.      Electronically signed by Shane Calixto on 10/2/2020 at 1:19 PM

## 2020-10-02 NOTE — PLAN OF CARE
Problem: Skin Integrity:  Goal: Will show no infection signs and symptoms  Description: Will show no infection signs and symptoms  10/2/2020 1223 by Kelli Dee  Outcome: Ongoing  10/2/2020 0048 by Nathan Kang RN  Outcome: Ongoing  Goal: Absence of new skin breakdown  Description: Absence of new skin breakdown  10/2/2020 1223 by Kelli Dee  Outcome: Ongoing  10/2/2020 0048 by Nathan Kang RN  Outcome: Ongoing     Problem: Pain:  Goal: Pain level will decrease  Description: Pain level will decrease  10/2/2020 1223 by Kelli Dee  Outcome: Ongoing  10/2/2020 0048 by Nathan Kang RN  Outcome: Ongoing  Goal: Control of acute pain  Description: Control of acute pain  10/2/2020 1223 by Kelli Dee  Outcome: Ongoing  10/2/2020 0048 by Nathan Kang RN  Outcome: Ongoing  Goal: Control of chronic pain  Description: Control of chronic pain  10/2/2020 1223 by Kelli Dee  Outcome: Ongoing  10/2/2020 0048 by Nathan Kang RN  Outcome: Ongoing     Problem: Gas Exchange - Impaired:  Goal: Levels of oxygenation will improve  Description: Levels of oxygenation will improve  10/2/2020 1223 by Kelli Dee  Outcome: Ongoing  10/2/2020 0048 by Nathan Kang RN  Outcome: Ongoing     Problem: Cardiac:  Goal: Ability to maintain vital signs within normal range will improve  Description: Ability to maintain vital signs within normal range will improve  10/2/2020 1223 by Kelli Dee  Outcome: Ongoing  10/2/2020 0048 by Nathan Kang RN  Outcome: Ongoing  Goal: Cardiovascular alteration will improve  Description: Cardiovascular alteration will improve  10/2/2020 1223 by Kelli Dee  Outcome: Ongoing  10/2/2020 0048 by Nathan Kang RN  Outcome: Ongoing     Problem: Health Behavior:  Goal: Will modify at least one risk factor affecting health status  Description: Will modify at least one risk factor affecting health status  10/2/2020 1223 by Kelli Dee  Outcome: Ongoing  10/2/2020 0048 by Rosa M Chairez RN  Outcome: Ongoing  Goal: Identification of resources available to assist in meeting health care needs will improve  Description: Identification of resources available to assist in meeting health care needs will improve  10/2/2020 1223 by Jonel Randall  Outcome: Ongoing  10/2/2020 0048 by Rosa M Chairez RN  Outcome: Ongoing     Problem: Physical Regulation:  Goal: Complications related to the disease process, condition or treatment will be avoided or minimized  Description: Complications related to the disease process, condition or treatment will be avoided or minimized  10/2/2020 1223 by Jonel Randall  Outcome: Ongoing  10/2/2020 0048 by Rosa M Chairez RN  Outcome: Ongoing     Problem: Discharge Planning:  Goal: Discharged to appropriate level of care  Description: Discharged to appropriate level of care  10/2/2020 1223 by Jonel Randall  Outcome: Ongoing  10/2/2020 0048 by Rosa M Chairez RN  Outcome: Ongoing

## 2020-10-02 NOTE — PROGRESS NOTES
Patient alert and oriented times four. Fall risk assessment completed. Fall precautions in place. Call light within reach. Pt educated on calling for assistance before getting up. Walkway free of clutter. Will continue to monitor.    Electronically signed by Gary Engel RN on 10/2/2020 at 2:20 AM

## 2020-10-03 ENCOUNTER — APPOINTMENT (OUTPATIENT)
Dept: GENERAL RADIOLOGY | Age: 60
DRG: 136 | End: 2020-10-03
Payer: MEDICAID

## 2020-10-03 LAB
ANION GAP SERPL CALCULATED.3IONS-SCNC: 11 MMOL/L (ref 3–16)
ANION GAP SERPL CALCULATED.3IONS-SCNC: 7 MMOL/L (ref 3–16)
BUN BLDV-MCNC: 10 MG/DL (ref 7–20)
BUN BLDV-MCNC: 7 MG/DL (ref 7–20)
CALCIUM SERPL-MCNC: 8.2 MG/DL (ref 8.3–10.6)
CALCIUM SERPL-MCNC: 8.3 MG/DL (ref 8.3–10.6)
CHLORIDE BLD-SCNC: 94 MMOL/L (ref 99–110)
CHLORIDE BLD-SCNC: 97 MMOL/L (ref 99–110)
CO2: 25 MMOL/L (ref 21–32)
CO2: 26 MMOL/L (ref 21–32)
CREAT SERPL-MCNC: 0.6 MG/DL (ref 0.8–1.3)
CREAT SERPL-MCNC: <0.5 MG/DL (ref 0.8–1.3)
GFR AFRICAN AMERICAN: >60
GFR AFRICAN AMERICAN: >60
GFR NON-AFRICAN AMERICAN: >60
GFR NON-AFRICAN AMERICAN: >60
GLUCOSE BLD-MCNC: 124 MG/DL (ref 70–99)
GLUCOSE BLD-MCNC: 159 MG/DL (ref 70–99)
LACTATE DEHYDROGENASE: 259 U/L (ref 100–190)
LACTATE DEHYDROGENASE: 265 U/L (ref 100–190)
MAGNESIUM: 2 MG/DL (ref 1.8–2.4)
MAGNESIUM: 2.1 MG/DL (ref 1.8–2.4)
PHOSPHORUS: 3.3 MG/DL (ref 2.5–4.9)
PHOSPHORUS: 3.8 MG/DL (ref 2.5–4.9)
POTASSIUM SERPL-SCNC: 3.6 MMOL/L (ref 3.5–5.1)
POTASSIUM SERPL-SCNC: 4.1 MMOL/L (ref 3.5–5.1)
SODIUM BLD-SCNC: 130 MMOL/L (ref 136–145)
SODIUM BLD-SCNC: 130 MMOL/L (ref 136–145)
URIC ACID, SERUM: 2.7 MG/DL (ref 3.5–7.2)
URIC ACID, SERUM: 3.2 MG/DL (ref 3.5–7.2)

## 2020-10-03 PROCEDURE — 2580000003 HC RX 258: Performed by: INTERNAL MEDICINE

## 2020-10-03 PROCEDURE — 6370000000 HC RX 637 (ALT 250 FOR IP): Performed by: INTERNAL MEDICINE

## 2020-10-03 PROCEDURE — 6360000002 HC RX W HCPCS: Performed by: INTERNAL MEDICINE

## 2020-10-03 PROCEDURE — 83615 LACTATE (LD) (LDH) ENZYME: CPT

## 2020-10-03 PROCEDURE — 71045 X-RAY EXAM CHEST 1 VIEW: CPT

## 2020-10-03 PROCEDURE — 94760 N-INVAS EAR/PLS OXIMETRY 1: CPT

## 2020-10-03 PROCEDURE — 36592 COLLECT BLOOD FROM PICC: CPT

## 2020-10-03 PROCEDURE — 1200000000 HC SEMI PRIVATE

## 2020-10-03 PROCEDURE — 80048 BASIC METABOLIC PNL TOTAL CA: CPT

## 2020-10-03 PROCEDURE — 84550 ASSAY OF BLOOD/URIC ACID: CPT

## 2020-10-03 PROCEDURE — 6360000002 HC RX W HCPCS: Performed by: RADIOLOGY

## 2020-10-03 PROCEDURE — 84100 ASSAY OF PHOSPHORUS: CPT

## 2020-10-03 PROCEDURE — 83735 ASSAY OF MAGNESIUM: CPT

## 2020-10-03 RX ADMIN — CLONIDINE HYDROCHLORIDE 0.3 MG: 0.1 TABLET ORAL at 20:46

## 2020-10-03 RX ADMIN — LABETALOL HYDROCHLORIDE 300 MG: 200 TABLET, FILM COATED ORAL at 09:11

## 2020-10-03 RX ADMIN — MORPHINE SULFATE 4 MG: 4 INJECTION, SOLUTION INTRAMUSCULAR; INTRAVENOUS at 05:27

## 2020-10-03 RX ADMIN — MORPHINE SULFATE 4 MG: 4 INJECTION, SOLUTION INTRAMUSCULAR; INTRAVENOUS at 21:32

## 2020-10-03 RX ADMIN — ALLOPURINOL 300 MG: 300 TABLET ORAL at 09:11

## 2020-10-03 RX ADMIN — DULOXETINE HYDROCHLORIDE 60 MG: 60 CAPSULE, DELAYED RELEASE ORAL at 09:11

## 2020-10-03 RX ADMIN — OXYCODONE HYDROCHLORIDE 10 MG: 10 TABLET, FILM COATED, EXTENDED RELEASE ORAL at 09:11

## 2020-10-03 RX ADMIN — MORPHINE SULFATE 4 MG: 4 INJECTION, SOLUTION INTRAMUSCULAR; INTRAVENOUS at 23:44

## 2020-10-03 RX ADMIN — ETOPOSIDE 126 MG: 20 INJECTION INTRAVENOUS at 13:55

## 2020-10-03 RX ADMIN — SODIUM CHLORIDE, PRESERVATIVE FREE 10 ML: 5 INJECTION INTRAVENOUS at 09:13

## 2020-10-03 RX ADMIN — OXYCODONE HYDROCHLORIDE 10 MG: 10 TABLET, FILM COATED, EXTENDED RELEASE ORAL at 20:47

## 2020-10-03 RX ADMIN — HYDRALAZINE HYDROCHLORIDE 25 MG: 25 TABLET, FILM COATED ORAL at 09:11

## 2020-10-03 RX ADMIN — MORPHINE SULFATE 4 MG: 4 INJECTION, SOLUTION INTRAMUSCULAR; INTRAVENOUS at 13:13

## 2020-10-03 RX ADMIN — ASPIRIN 325 MG ORAL TABLET 325 MG: 325 PILL ORAL at 09:11

## 2020-10-03 RX ADMIN — SODIUM CHLORIDE, PRESERVATIVE FREE 10 ML: 5 INJECTION INTRAVENOUS at 20:47

## 2020-10-03 RX ADMIN — ENOXAPARIN SODIUM 40 MG: 40 INJECTION SUBCUTANEOUS at 09:10

## 2020-10-03 RX ADMIN — DEXAMETHASONE SODIUM PHOSPHATE: 4 INJECTION, SOLUTION INTRA-ARTICULAR; INTRALESIONAL; INTRAMUSCULAR; INTRAVENOUS; SOFT TISSUE at 12:37

## 2020-10-03 RX ADMIN — CLONIDINE HYDROCHLORIDE 0.3 MG: 0.1 TABLET ORAL at 13:13

## 2020-10-03 RX ADMIN — ESCITALOPRAM OXALATE 10 MG: 10 TABLET ORAL at 09:11

## 2020-10-03 RX ADMIN — MORPHINE SULFATE 4 MG: 4 INJECTION, SOLUTION INTRAMUSCULAR; INTRAVENOUS at 18:43

## 2020-10-03 RX ADMIN — LABETALOL HYDROCHLORIDE 300 MG: 200 TABLET, FILM COATED ORAL at 20:46

## 2020-10-03 RX ADMIN — MORPHINE SULFATE 4 MG: 4 INJECTION, SOLUTION INTRAMUSCULAR; INTRAVENOUS at 01:59

## 2020-10-03 RX ADMIN — HYDRALAZINE HYDROCHLORIDE 25 MG: 25 TABLET, FILM COATED ORAL at 13:13

## 2020-10-03 RX ADMIN — ROSUVASTATIN CALCIUM 5 MG: 10 TABLET, FILM COATED ORAL at 19:44

## 2020-10-03 RX ADMIN — CLONIDINE HYDROCHLORIDE 0.3 MG: 0.1 TABLET ORAL at 09:10

## 2020-10-03 RX ADMIN — HYDRALAZINE HYDROCHLORIDE 25 MG: 25 TABLET, FILM COATED ORAL at 20:46

## 2020-10-03 ASSESSMENT — PAIN DESCRIPTION - ORIENTATION
ORIENTATION: RIGHT;ANTERIOR
ORIENTATION: RIGHT
ORIENTATION: RIGHT;LEFT
ORIENTATION: RIGHT;ANTERIOR
ORIENTATION: RIGHT;LEFT
ORIENTATION: RIGHT;LEFT
ORIENTATION: RIGHT;ANTERIOR
ORIENTATION: RIGHT;ANTERIOR

## 2020-10-03 ASSESSMENT — PAIN DESCRIPTION - FREQUENCY
FREQUENCY: CONTINUOUS

## 2020-10-03 ASSESSMENT — PAIN DESCRIPTION - PAIN TYPE
TYPE: ACUTE PAIN

## 2020-10-03 ASSESSMENT — PAIN SCALES - GENERAL
PAINLEVEL_OUTOF10: 4
PAINLEVEL_OUTOF10: 0
PAINLEVEL_OUTOF10: 8
PAINLEVEL_OUTOF10: 8
PAINLEVEL_OUTOF10: 0
PAINLEVEL_OUTOF10: 10
PAINLEVEL_OUTOF10: 6
PAINLEVEL_OUTOF10: 8
PAINLEVEL_OUTOF10: 7
PAINLEVEL_OUTOF10: 9
PAINLEVEL_OUTOF10: 8
PAINLEVEL_OUTOF10: 4
PAINLEVEL_OUTOF10: 0

## 2020-10-03 ASSESSMENT — PAIN DESCRIPTION - ONSET
ONSET: ON-GOING

## 2020-10-03 ASSESSMENT — PAIN DESCRIPTION - DESCRIPTORS
DESCRIPTORS: ACHING;CONSTANT
DESCRIPTORS: ACHING
DESCRIPTORS: ACHING;CONSTANT

## 2020-10-03 ASSESSMENT — PAIN DESCRIPTION - LOCATION
LOCATION: GENERALIZED

## 2020-10-03 ASSESSMENT — PAIN DESCRIPTION - PROGRESSION
CLINICAL_PROGRESSION: NOT CHANGED

## 2020-10-03 ASSESSMENT — PAIN SCALES - WONG BAKER
WONGBAKER_NUMERICALRESPONSE: 8
WONGBAKER_NUMERICALRESPONSE: 4
WONGBAKER_NUMERICALRESPONSE: 4
WONGBAKER_NUMERICALRESPONSE: 6
WONGBAKER_NUMERICALRESPONSE: 8

## 2020-10-03 ASSESSMENT — PAIN - FUNCTIONAL ASSESSMENT
PAIN_FUNCTIONAL_ASSESSMENT: PREVENTS OR INTERFERES SOME ACTIVE ACTIVITIES AND ADLS

## 2020-10-03 ASSESSMENT — PAIN DESCRIPTION - DIRECTION: RADIATING_TOWARDS: MID

## 2020-10-03 NOTE — PROGRESS NOTES
Patient states,  \" I feel like my eyes are swollen. \" Blood pressure (!) 147/80, pulse 79, temperature 97.8 °F (36.6 °C), temperature source Oral, resp. rate 16, height 3' 6\" (1.067 m), weight 183 lb 10.3 oz (83.3 kg), SpO2 92 %. Will continue to monitor and assess.  Electronically signed by Elaine Martinez RN on 10/3/2020 at 2:52 AM

## 2020-10-03 NOTE — PROGRESS NOTES
Patient alert and oriented times four. Iv fluids infusing as ordered. Prn pain medication given as ordered. Fall risk assessment completed. Fall precautions in place. Call light within reach. Pt educated on calling for assistance before getting up. Walkway free of clutter. Will continue to monitor.  Electronically signed by Nicole Qiu RN on 10/2/2020 at 8:28 PM

## 2020-10-03 NOTE — PROGRESS NOTES
Port accessed by RN supervisor, good blood return, AM labs sent, pre-med started will start chemo around 1400.  Electronically signed by Shannan Sánchez RN on 10/3/2020 at 12:48 PM

## 2020-10-03 NOTE — PROGRESS NOTES
Hospitalist Progress Note      PCP: Kusum Boyd MD    Date of Admission: 9/28/2020    Chief Complaint: Chest pain    Hospital Course: Patient is a 77-year-old  male who presented to the hospital with a 2 through the progressively worsening anterior chest pain along with back pain that is associated with shortness of breath. Patient has a history of severe peripheral vascular disease status post bilateral above-the-knee amputations, type 2 diabetes, hypertension and dyslipidemia. In the emergency department a chest x-ray was performed which showed a possible right hilar mass. CT of the chest was then performed which showed a large 8.3 cm right suprahilar upper lobe pulmonary mass with compression of the hilar and paratracheal nodes and narrowing of the mid SVC segment with evidence of some degree of SVC obstruction. There is also concern for a right-sided locally advanced bronchogenic cancer. Pulmonology was consulted and will likely need bronchoscopy with biopsy. Hematology oncology also consulted. Subjective: Patient seen and examined on the bedside today, patient denies chest pain nausea vomiting, mentions he slept well last night, denies dizziness fevers.   Reportedly patient pulled out his Mediport last night however patient mentions he does not recall he may have pulled out while in  Sleep    Medications:  Reviewed    Infusion Medications    sodium chloride 100 mL/hr at 10/02/20 1001     Scheduled Medications    allopurinol  300 mg Oral Daily    etoposide (VEPESID) chemo IVPB  80 mg/m2 Intravenous Daily    ondansetron with dexamethasone IVPB   Intravenous Daily    oxyCODONE  10 mg Oral 2 times per day    ceFAZolin  2 g Intravenous 60 Min Pre-Op    nicotine  1 patch Transdermal Daily    cloNIDine  0.3 mg Oral TID    aspirin  325 mg Oral Daily    DULoxetine  60 mg Oral Daily    escitalopram  10 mg Oral Daily    hydrALAZINE  25 mg Oral TID    labetalol  300 mg Oral BID    rosuvastatin  5 mg Oral Nightly    sodium chloride flush  10 mL Intravenous 2 times per day    enoxaparin  40 mg Subcutaneous Daily     PRN Meds: hydrALAZINE, morphine, sodium chloride flush, promethazine **OR** ondansetron, potassium chloride, magnesium sulfate      Intake/Output Summary (Last 24 hours) at 10/3/2020 1214  Last data filed at 10/3/2020 1035  Gross per 24 hour   Intake 240 ml   Output 1450 ml   Net -1210 ml       Physical Exam Performed:    /82   Pulse 62   Temp 98.4 °F (36.9 °C) (Oral)   Resp 16   Ht 3' 6\" (1.067 m)   Wt 183 lb 13.8 oz (83.4 kg)   SpO2 96%   BMI 73.28 kg/m²     General appearance: No apparent distress, appears stated age and cooperative. HEENT: Pupils equal, round, and reactive to light. Conjunctivae/corneas clear. Neck: Supple, with full range of motion. No jugular venous distention. Trachea midline. Respiratory:  Normal respiratory effort. Clear to auscultation, bilaterally without Rales/Wheezes/Rhonchi. Cardiovascular: Regular rate and rhythm with normal S1/S2  Abdomen: Soft, non-tender, non-distended with normal bowel sounds. Musculoskeletal: Bilateral AKA  Skin: Skin color, texture, turgor normal.  No rashes or lesions. Neurologic:  Neurovascularly intact without any focal sensory/motor deficits. Cranial nerves: II-XII intact, grossly non-focal.  Psychiatric: Alert and oriented, thought content appropriate, normal insight  Capillary Refill: Brisk,< 3 seconds   Peripheral Pulses: +2 palpable upper extremity, equal bilaterally       Labs:   Recent Labs     10/01/20  1759 10/02/20  0532   WBC 7.7 7.2   HGB 12.6* 12.3*   HCT 37.7* 36.0*    380     Recent Labs     10/01/20  1759 10/02/20  0532   * 135*   K 4.3 4.1   CL 97* 99   CO2 21 26   BUN 10 9   CREATININE <0.5* <0.5*   CALCIUM 8.9 8.9     No results for input(s): AST, ALT, BILIDIR, BILITOT, ALKPHOS in the last 72 hours.   Recent Labs     09/30/20  1421   INR 1.11     No results for input(s): Seema Martinez in the last 72 hours. Urinalysis:      Lab Results   Component Value Date    NITRU Negative 02/29/2020    WBCUA 1 02/29/2020    RBCUA 4 02/29/2020    BLOODU Negative 02/29/2020    SPECGRAV 1.016 02/29/2020    GLUCOSEU Negative 02/29/2020       Radiology:  XR CHEST PORTABLE   Final Result   Right IJ port tip in appropriate position. IR PORT PLACEMENT > 5 YEARS   Final Result   Successful ultrasound and fluoroscopy guided right internal jugular power   port placement. CT ABDOMEN PELVIS W IV CONTRAST Additional Contrast? Oral   Final Result   No evidence of abdominopelvic metastatic disease         NM BONE SCAN WHOLE BODY   Final Result   Multifocal degenerative change, without a generalized scintigraphic pattern   of osseous metastatic disease. CT CHEST PULMONARY EMBOLISM W CONTRAST   Final Result   No evidence of pulmonary embolism. Large 8.3 cm right suprahilar/upper lobe pulmonic mass which is confluent   with enlarged right hilar and pretracheal lymph nodes. This is consistent   with bronchogenic carcinoma. The mass significantly narrows the traversing   mid SVC segment and the presence of fat infiltration of the mediastinum could   indicate some degree of SVC syndrome/obstruction. Trace bilateral pleural effusions. XR CHEST PORTABLE   Final Result   1. New right hilar/suprahilar mass. Recommend CT chest   2. Left pleural effusion                 Assessment/Plan:    Patient is a 77-year-old male who presented to hospital with 2-day history of progressive worsening anterior chest pain along with back pain associated with shortness of breath. Patient has past medical history of PAD status post bilateral above-knee amputations, diabetes mellitus 2, hypertension, dyslipidemia.      Assessment  Superior vena cava syndrome  Right hilar lung mass, biopsy positive for small cell lung cancer  Severe PAD status post bilateral AKA  Diabetes mellitus

## 2020-10-03 NOTE — PLAN OF CARE
Problem: Skin Integrity:  Goal: Will show no infection signs and symptoms  Description: Will show no infection signs and symptoms  10/3/2020 0020 by Nathan Kang RN  Outcome: Ongoing  10/2/2020 1223 by Kelli Dee  Outcome: Ongoing  Goal: Absence of new skin breakdown  Description: Absence of new skin breakdown  10/3/2020 0020 by Nathan Kang RN  Outcome: Ongoing  10/2/2020 1223 by Kelli Dee  Outcome: Ongoing     Problem: Pain:  Goal: Pain level will decrease  Description: Pain level will decrease  10/3/2020 0020 by Nathan Kang RN  Outcome: Ongoing  10/2/2020 1223 by Kelli Dee  Outcome: Ongoing  Goal: Control of acute pain  Description: Control of acute pain  10/3/2020 0020 by Nathan Kang RN  Outcome: Ongoing  10/2/2020 1223 by Kelli Dee  Outcome: Ongoing  Goal: Control of chronic pain  Description: Control of chronic pain  10/3/2020 0020 by Nathan Kang RN  Outcome: Ongoing  10/2/2020 1223 by Kelli Dee  Outcome: Ongoing     Problem: Gas Exchange - Impaired:  Goal: Levels of oxygenation will improve  Description: Levels of oxygenation will improve  10/3/2020 0020 by Nathan Kang RN  Outcome: Ongoing  10/2/2020 1223 by Kelli Dee  Outcome: Ongoing     Problem: Cardiac:  Goal: Ability to maintain vital signs within normal range will improve  Description: Ability to maintain vital signs within normal range will improve  10/3/2020 0020 by Nathan Kang RN  Outcome: Ongoing  10/2/2020 1223 by Kelli Dee  Outcome: Ongoing  Goal: Cardiovascular alteration will improve  Description: Cardiovascular alteration will improve  10/3/2020 0020 by Nathan Kang RN  Outcome: Ongoing  10/2/2020 1223 by Kelli Dee  Outcome: Ongoing     Problem: Health Behavior:  Goal: Will modify at least one risk factor affecting health status  Description: Will modify at least one risk factor affecting health status  10/3/2020 0020 by Nathan Kang RN  Outcome: Ongoing  10/2/2020 1223 by John Delong  Outcome: Ongoing  Goal: Identification of resources available to assist in meeting health care needs will improve  Description: Identification of resources available to assist in meeting health care needs will improve  10/3/2020 0020 by Geena Neri RN  Outcome: Ongoing  10/2/2020 1223 by John Delong  Outcome: Ongoing     Problem: Physical Regulation:  Goal: Complications related to the disease process, condition or treatment will be avoided or minimized  Description: Complications related to the disease process, condition or treatment will be avoided or minimized  10/3/2020 0020 by Geena Neri RN  Outcome: Ongoing  10/2/2020 1223 by John Delong  Outcome: Ongoing     Problem: Discharge Planning:  Goal: Discharged to appropriate level of care  Description: Discharged to appropriate level of care  10/3/2020 0020 by Geena Neri RN  Outcome: Ongoing  10/2/2020 1223 by John Delong  Outcome: Ongoing

## 2020-10-03 NOTE — PROGRESS NOTES
Ice applied to under patient's eyes. Patient states, \" I think it's helped. \" Will continue to monitor and assess.  Electronically signed by Alex Chase RN on 10/3/2020 at 5:57 AM

## 2020-10-03 NOTE — PROGRESS NOTES
ONCOLOGY HEMATOLOGY CARE  PROGRESS NOTE    SUBJECTIVE:       Cycle 1 day 2 carboplatin/VP16. Pt reports no problems. PHYSICAL EXAM:       BP (!) 175/93   Pulse 97   Temp 97.5 °F (36.4 °C) (Oral)   Resp 16   Ht 3' 6\" (1.067 m)   Wt 183 lb 13.8 oz (83.4 kg)   SpO2 (!) 87%   BMI 73.28 kg/m²     General appearance: Alert and cooperative. Appears MUCH older than stated age. Comfortable. Head: Normocephalic, without obvious abnormality, atraumatic  EENT:  No icterus. No mucositis. Neck: No JVD. Lungs: On 2L O2, but breathing comfortably, flat on his side. Heart: Regular rate and rhythm, S1, S2 normal  Abdomen: Soft, obese, non-tender; bowel sounds normal; no masses,  No hepatosplenomegaly, distention. Extremities: Bilateral AKA  Skin: Mickey. No jaundice, purpura or petechiae  Musculoskeletal:  No joint swelling, deformities, tenderness, erythema. Neuro:  Alert and oriented. Speech is normal.    Cranial nerves are intact. Our conversation was appropriate.       MEDS:     Current Facility-Administered Medications   Medication Dose Route Frequency Provider Last Rate Last Dose    0.9 % sodium chloride infusion   Intravenous Continuous Cori Walter  mL/hr at 10/02/20 4151      allopurinol (ZYLOPRIM) tablet 300 mg  300 mg Oral Daily Cori Walter MD   300 mg at 10/02/20 0836    etoposide 126 mg in sodium chloride 0.9 % 500 mL chemo IVPB  80 mg/m2 Intravenous Daily Cori Walter MD   Stopped at 10/02/20 2017    ondansetron (ZOFRAN) 16 mg, dexamethasone (DECADRON) 8 mg in sodium chloride 0.9 % 50 mL IVPB   Intravenous Daily Cori Walter MD   Stopped at 10/02/20 1407    hydrALAZINE (APRESOLINE) injection 10 mg  10 mg Intravenous Q6H PRN Torsten Meyer MD   10 mg at 10/02/20 1816    oxyCODONE (OXYCONTIN) extended release tablet 10 mg  10 mg Oral 2 times per day Marcelo Hubbard MD   10 mg at 10/02/20 2130    ceFAZolin (ANCEF) 2 g in dextrose 5 % 100 mL IVPB  2 g Intravenous 60 Min Pre-Op Bartolo Mckeon MD   Stopped at 10/01/20 1615    nicotine (NICODERM CQ) 14 MG/24HR 1 patch  1 patch Transdermal Daily Ann Alfred MD   1 patch at 10/02/20 0845    morphine (PF) injection 4 mg  4 mg Intravenous Q2H PRN Ann Alfred MD   4 mg at 10/03/20 2640    cloNIDine (CATAPRES) tablet 0.3 mg  0.3 mg Oral TID Ian Escamilla MD   0.3 mg at 10/02/20 2000    aspirin tablet 325 mg  325 mg Oral Daily Ian Escamilla MD   325 mg at 10/02/20 0836    DULoxetine (CYMBALTA) extended release capsule 60 mg  60 mg Oral Daily Ian Escamilla MD   60 mg at 10/02/20 0836    escitalopram (LEXAPRO) tablet 10 mg  10 mg Oral Daily Ian Escamilla MD   10 mg at 10/02/20 3679    hydrALAZINE (APRESOLINE) tablet 25 mg  25 mg Oral TID Ian Escamilla MD   25 mg at 10/02/20 2001    labetalol (NORMODYNE) tablet 300 mg  300 mg Oral BID Ian Escamilla MD   300 mg at 10/02/20 2001    rosuvastatin (CRESTOR) tablet 5 mg  5 mg Oral Nightly Ian Escamilla MD   5 mg at 10/02/20 2000    sodium chloride flush 0.9 % injection 10 mL  10 mL Intravenous 2 times per day Ian Escamilla MD   10 mL at 10/02/20 2017    sodium chloride flush 0.9 % injection 10 mL  10 mL Intravenous PRN Ian Escamilla MD   10 mL at 10/01/20 0239    promethazine (PHENERGAN) tablet 12.5 mg  12.5 mg Oral Q6H PRN Ian Escamilla MD        Or    ondansetron [facility]) injection 4 mg  4 mg Intravenous Q6H PRN Ian Escamilla MD        enoxaparin (LOVENOX) injection 40 mg  40 mg Subcutaneous Daily Ian Escamilla MD   40 mg at 10/02/20 0836    potassium chloride 10 mEq/100 mL IVPB (Peripheral Line)  10 mEq Intravenous PRN Ian Escamilla MD        magnesium sulfate 2 g in 50 mL IVPB premix  2 g Intravenous PRN Ian Escamilla MD           LABS:     CBC:   Lab Results   Component Value Date    WBC 7.2 10/02/2020    HGB 12.3 (L) 10/02/2020    HCT 36.0 (L) 10/02/2020    MCV 88.6 10/02/2020     10/02/2020    LYMPHOPCT 9.4 09/28/2020    RBC 4.07 (L) 10/02/2020    MCH 30.3 10/02/2020    MCHC 34.2 10/02/2020    RDW 14.2 10/02/2020    NEUTOPHILPCT 82.8 09/28/2020    MONOPCT 6.3 09/28/2020    BASOPCT 0.5 09/28/2020    NEUTROABS 8.5 (H) 09/28/2020    LYMPHSABS 1.0 09/28/2020    MONOSABS 0.6 09/28/2020    EOSABS 0.1 09/28/2020    BASOSABS 0.0 09/28/2020       CMP:   Lab Results   Component Value Date     10/02/2020    K 4.1 10/02/2020    CL 99 10/02/2020    CO2 26 10/02/2020    BUN 9 10/02/2020    CREATININE <0.5 10/02/2020    GLUCOSE 104 10/02/2020    CALCIUM 8.9 10/02/2020    PROT 6.5 09/28/2020    LABALBU 4.3 09/28/2020    BILITOT 0.3 09/28/2020    ALKPHOS 127 09/28/2020    AST 12 09/28/2020    ALT 12 09/28/2020      Awaiting LDH, Uric acid, being monitored. IMAGING:     None in past 24 hrs. ASSESSMENT:         Patient Active Problem List   Diagnosis Code    Type 2 diabetes mellitus with circulatory disorder ([facility] [address] E11.59    Mixed hyperlipidemia E78.2    Essential hypertension I10    Phantom limb syndrome with pain (HCC) G54.6    Tobacco abuse Z72.0    History of substance abuse ([facility] [address] F19.11    Chronic pain syndrome G89.4    Lumbosacral spondylosis without myelopathy M47.817    Superior vena cava compression syndrome I87.1    Mediastinal mass J98.59     Hypertension is fluctuating. PLAN:       Day 2-VP16 only. ? He could be discharged after chemotherapy tomorrow, if no problems.     Tea Fernández

## 2020-10-03 NOTE — PROGRESS NOTES
Pt has pulled his port out twice (de-accessed), attempted to re-access port for chemo and blood work and could not gain access, one other RN attempted access and also could not regain it, ordered chest x ray per oncologist to access patients port to verify it is ok to access. Unable to draw blood or infuse chemo at this time, will await cxr results.  Electronically signed by Charlcie Libman, RN on 10/3/2020 at 10:28 AM

## 2020-10-04 VITALS
OXYGEN SATURATION: 96 % | WEIGHT: 183.86 LBS | BODY MASS INDEX: 36.1 KG/M2 | HEIGHT: 60 IN | HEART RATE: 82 BPM | DIASTOLIC BLOOD PRESSURE: 96 MMHG | RESPIRATION RATE: 16 BRPM | SYSTOLIC BLOOD PRESSURE: 162 MMHG | TEMPERATURE: 97.9 F

## 2020-10-04 LAB
ANION GAP SERPL CALCULATED.3IONS-SCNC: 9 MMOL/L (ref 3–16)
BUN BLDV-MCNC: 6 MG/DL (ref 7–20)
CALCIUM SERPL-MCNC: 8.4 MG/DL (ref 8.3–10.6)
CHLORIDE BLD-SCNC: 97 MMOL/L (ref 99–110)
CO2: 27 MMOL/L (ref 21–32)
CREAT SERPL-MCNC: <0.5 MG/DL (ref 0.8–1.3)
GFR AFRICAN AMERICAN: >60
GFR NON-AFRICAN AMERICAN: >60
GLUCOSE BLD-MCNC: 114 MG/DL (ref 70–99)
LACTATE DEHYDROGENASE: 292 U/L (ref 100–190)
MAGNESIUM: 2 MG/DL (ref 1.8–2.4)
PHOSPHORUS: 3.1 MG/DL (ref 2.5–4.9)
POTASSIUM SERPL-SCNC: 3.7 MMOL/L (ref 3.5–5.1)
SODIUM BLD-SCNC: 133 MMOL/L (ref 136–145)
URIC ACID, SERUM: 2.8 MG/DL (ref 3.5–7.2)

## 2020-10-04 PROCEDURE — 84100 ASSAY OF PHOSPHORUS: CPT

## 2020-10-04 PROCEDURE — 83735 ASSAY OF MAGNESIUM: CPT

## 2020-10-04 PROCEDURE — 6360000002 HC RX W HCPCS: Performed by: INTERNAL MEDICINE

## 2020-10-04 PROCEDURE — 6370000000 HC RX 637 (ALT 250 FOR IP): Performed by: INTERNAL MEDICINE

## 2020-10-04 PROCEDURE — 83615 LACTATE (LD) (LDH) ENZYME: CPT

## 2020-10-04 PROCEDURE — 2700000000 HC OXYGEN THERAPY PER DAY

## 2020-10-04 PROCEDURE — 2580000003 HC RX 258: Performed by: INTERNAL MEDICINE

## 2020-10-04 PROCEDURE — 94760 N-INVAS EAR/PLS OXIMETRY 1: CPT

## 2020-10-04 PROCEDURE — 84550 ASSAY OF BLOOD/URIC ACID: CPT

## 2020-10-04 PROCEDURE — 80048 BASIC METABOLIC PNL TOTAL CA: CPT

## 2020-10-04 RX ORDER — NICOTINE 21 MG/24HR
1 PATCH, TRANSDERMAL 24 HOURS TRANSDERMAL DAILY
Qty: 30 PATCH | Refills: 3 | Status: SHIPPED | OUTPATIENT
Start: 2020-10-05

## 2020-10-04 RX ORDER — LABETALOL HYDROCHLORIDE 5 MG/ML
10 INJECTION, SOLUTION INTRAVENOUS ONCE
Status: DISCONTINUED | OUTPATIENT
Start: 2020-10-04 | End: 2020-10-04 | Stop reason: ALTCHOICE

## 2020-10-04 RX ORDER — HYDRALAZINE HYDROCHLORIDE 20 MG/ML
10 INJECTION INTRAMUSCULAR; INTRAVENOUS EVERY 6 HOURS PRN
Status: DISCONTINUED | OUTPATIENT
Start: 2020-10-04 | End: 2020-10-04 | Stop reason: ALTCHOICE

## 2020-10-04 RX ORDER — OXYCODONE HCL 10 MG/1
10 TABLET, FILM COATED, EXTENDED RELEASE ORAL EVERY 12 HOURS SCHEDULED
Qty: 6 TABLET | Refills: 0 | Status: SHIPPED | OUTPATIENT
Start: 2020-10-04 | End: 2020-10-07

## 2020-10-04 RX ORDER — LABETALOL HYDROCHLORIDE 5 MG/ML
10 INJECTION, SOLUTION INTRAVENOUS EVERY 6 HOURS PRN
Status: DISCONTINUED | OUTPATIENT
Start: 2020-10-04 | End: 2020-10-04 | Stop reason: HOSPADM

## 2020-10-04 RX ORDER — ALLOPURINOL 300 MG/1
300 TABLET ORAL DAILY
Qty: 30 TABLET | Refills: 3 | Status: SHIPPED | OUTPATIENT
Start: 2020-10-05

## 2020-10-04 RX ADMIN — OXYCODONE HYDROCHLORIDE 10 MG: 10 TABLET, FILM COATED, EXTENDED RELEASE ORAL at 08:48

## 2020-10-04 RX ADMIN — HYDRALAZINE HYDROCHLORIDE 10 MG: 20 INJECTION INTRAMUSCULAR; INTRAVENOUS at 04:48

## 2020-10-04 RX ADMIN — SODIUM CHLORIDE: 9 INJECTION, SOLUTION INTRAVENOUS at 10:34

## 2020-10-04 RX ADMIN — ASPIRIN 325 MG ORAL TABLET 325 MG: 325 PILL ORAL at 08:48

## 2020-10-04 RX ADMIN — ENOXAPARIN SODIUM 40 MG: 40 INJECTION SUBCUTANEOUS at 08:47

## 2020-10-04 RX ADMIN — ESCITALOPRAM OXALATE 10 MG: 10 TABLET ORAL at 08:48

## 2020-10-04 RX ADMIN — CLONIDINE HYDROCHLORIDE 0.3 MG: 0.1 TABLET ORAL at 08:48

## 2020-10-04 RX ADMIN — HYDRALAZINE HYDROCHLORIDE 25 MG: 25 TABLET, FILM COATED ORAL at 14:52

## 2020-10-04 RX ADMIN — ETOPOSIDE 126 MG: 20 INJECTION INTRAVENOUS at 10:05

## 2020-10-04 RX ADMIN — HYDRALAZINE HYDROCHLORIDE 25 MG: 25 TABLET, FILM COATED ORAL at 08:48

## 2020-10-04 RX ADMIN — MORPHINE SULFATE 4 MG: 4 INJECTION, SOLUTION INTRAMUSCULAR; INTRAVENOUS at 12:54

## 2020-10-04 RX ADMIN — MORPHINE SULFATE 4 MG: 4 INJECTION, SOLUTION INTRAMUSCULAR; INTRAVENOUS at 04:52

## 2020-10-04 RX ADMIN — DULOXETINE HYDROCHLORIDE 60 MG: 60 CAPSULE, DELAYED RELEASE ORAL at 08:48

## 2020-10-04 RX ADMIN — DEXAMETHASONE SODIUM PHOSPHATE: 4 INJECTION, SOLUTION INTRA-ARTICULAR; INTRALESIONAL; INTRAMUSCULAR; INTRAVENOUS; SOFT TISSUE at 08:48

## 2020-10-04 RX ADMIN — LABETALOL HYDROCHLORIDE 300 MG: 200 TABLET, FILM COATED ORAL at 08:48

## 2020-10-04 RX ADMIN — ALLOPURINOL 300 MG: 300 TABLET ORAL at 08:48

## 2020-10-04 RX ADMIN — CLONIDINE HYDROCHLORIDE 0.3 MG: 0.1 TABLET ORAL at 14:52

## 2020-10-04 ASSESSMENT — PAIN - FUNCTIONAL ASSESSMENT
PAIN_FUNCTIONAL_ASSESSMENT: PREVENTS OR INTERFERES SOME ACTIVE ACTIVITIES AND ADLS
PAIN_FUNCTIONAL_ASSESSMENT: PREVENTS OR INTERFERES WITH MANY ACTIVE NOT PASSIVE ACTIVITIES

## 2020-10-04 ASSESSMENT — PAIN DESCRIPTION - FREQUENCY
FREQUENCY: CONTINUOUS

## 2020-10-04 ASSESSMENT — PAIN SCALES - GENERAL
PAINLEVEL_OUTOF10: 8
PAINLEVEL_OUTOF10: 8
PAINLEVEL_OUTOF10: 4
PAINLEVEL_OUTOF10: 0
PAINLEVEL_OUTOF10: 8
PAINLEVEL_OUTOF10: 4

## 2020-10-04 ASSESSMENT — PAIN DESCRIPTION - PROGRESSION
CLINICAL_PROGRESSION: NOT CHANGED

## 2020-10-04 ASSESSMENT — PAIN DESCRIPTION - DESCRIPTORS
DESCRIPTORS: ACHING

## 2020-10-04 ASSESSMENT — PAIN DESCRIPTION - ORIENTATION
ORIENTATION: RIGHT;LEFT
ORIENTATION: RIGHT

## 2020-10-04 ASSESSMENT — PAIN DESCRIPTION - LOCATION
LOCATION: GENERALIZED
LOCATION: LEG

## 2020-10-04 ASSESSMENT — PAIN SCALES - WONG BAKER
WONGBAKER_NUMERICALRESPONSE: 4
WONGBAKER_NUMERICALRESPONSE: 4
WONGBAKER_NUMERICALRESPONSE: 8

## 2020-10-04 ASSESSMENT — PAIN DESCRIPTION - DIRECTION
RADIATING_TOWARDS: MID
RADIATING_TOWARDS: MID

## 2020-10-04 ASSESSMENT — PAIN DESCRIPTION - ONSET
ONSET: ON-GOING

## 2020-10-04 ASSESSMENT — PAIN DESCRIPTION - PAIN TYPE
TYPE: ACUTE PAIN

## 2020-10-04 NOTE — PLAN OF CARE
Problem: Skin Integrity:  Goal: Will show no infection signs and symptoms  Description: Will show no infection signs and symptoms  10/3/2020 2316 by Lashonda Ace RN  Outcome: Ongoing  10/3/2020 1041 by Shira Guthrie RN  Outcome: Ongoing  Note: No chronic skin issues, will continue to monitor and assess. Electronically signed by Shira Guthrie RN on 10/3/2020 at 10:42 AM    Goal: Absence of new skin breakdown  Description: Absence of new skin breakdown  10/3/2020 2316 by Lashonda Ace RN  Outcome: Ongoing  10/3/2020 1041 by Shira Guthrie RN  Outcome: Ongoing     Problem: Pain:  Goal: Pain level will decrease  Description: Pain level will decrease  10/3/2020 2316 by Lashonda Ace RN  Outcome: Ongoing  10/3/2020 1041 by Shira Guthrie RN  Outcome: Ongoing  Note: Pt assessed for pain. Pt in pain and assessed with 0-10 pain rating scale. Pt given prescribed analgesic for pain. (See eMar) Pt satisfied with pain relief thus far. Will reassess and continue to monitor.    Electronically signed by Shira Guthrie RN on 10/3/2020 at 10:42 AM    Goal: Control of acute pain  Description: Control of acute pain  10/3/2020 2316 by Lashonda Ace RN  Outcome: Ongoing  10/3/2020 1041 by Shira Guthrie RN  Outcome: Ongoing  Goal: Control of chronic pain  Description: Control of chronic pain  10/3/2020 2316 by Lashonda Ace RN  Outcome: Ongoing  10/3/2020 1041 by Shira Guthrie RN  Outcome: Ongoing     Problem: Gas Exchange - Impaired:  Goal: Levels of oxygenation will improve  Description: Levels of oxygenation will improve  10/3/2020 2316 by Lashonda Ace RN  Outcome: Ongoing  10/3/2020 1041 by Shira Guthrie RN  Outcome: Ongoing     Problem: Cardiac:  Goal: Ability to maintain vital signs within normal range will improve  Description: Ability to maintain vital signs within normal range will improve  10/3/2020 2316 by Lashonda Ace RN  Outcome: Ongoing  10/3/2020 1041 by Shira Guthrie RN  Outcome: Ongoing  Goal: Cardiovascular alteration will improve  Description: Cardiovascular alteration will improve  10/3/2020 2316 by Gibson Elias RN  Outcome: Ongoing  10/3/2020 1041 by Chi Herron RN  Outcome: Ongoing     Problem: Health Behavior:  Goal: Will modify at least one risk factor affecting health status  Description: Will modify at least one risk factor affecting health status  10/3/2020 2316 by Gibson Elias RN  Outcome: Ongoing  10/3/2020 1041 by Chi Herron RN  Outcome: Ongoing  Goal: Identification of resources available to assist in meeting health care needs will improve  Description: Identification of resources available to assist in meeting health care needs will improve  10/3/2020 2316 by Gibson Elias RN  Outcome: Ongoing  10/3/2020 1041 by Chi Herron RN  Outcome: Ongoing     Problem: Physical Regulation:  Goal: Complications related to the disease process, condition or treatment will be avoided or minimized  Description: Complications related to the disease process, condition or treatment will be avoided or minimized  10/3/2020 2316 by Gibson Elias RN  Outcome: Ongoing  10/3/2020 1041 by Chi Herron RN  Outcome: Ongoing     Problem: Discharge Planning:  Goal: Discharged to appropriate level of care  Description: Discharged to appropriate level of care  10/3/2020 2316 by Gibson Elias RN  Outcome: Ongoing  10/3/2020 1041 by Chi Herron RN  Outcome: Ongoing

## 2020-10-04 NOTE — DISCHARGE INSTR - DIET

## 2020-10-04 NOTE — PROGRESS NOTES
T.O.   Dr. Valerie Milan    Change Hydralazine IV (unavailable) to    Labetalol 10 mg IVP q 6 h prn High Blood Pressure.  if SBP > 170, DBP > 100

## 2020-10-04 NOTE — DISCHARGE INSTR - COC
Continuity of Care Form    Patient Name: Irma Madrigal   :  1960  MRN:  [id number]    Admit date:  2020  Discharge date:  10/4/20    Code Status Order: Full Code   Advance Directives:   [facility] Documentation       Date/Time Healthcare Directive Type of Healthcare Directive Copy in [facility] [address] Agent's Name Healthcare Agent's Phone Number    20 9096  No, patient does not have an advance directive for healthcare treatment -- -- -- -- --    20 2345  No, patient does not have an advance directive for healthcare treatment -- -- -- -- --            Admitting Physician:  Marnie Randolph MD  PCP: Christi Shankar MD    Discharging Nurse: [facility] Unit/Room#: A1C-6283/3122-01  Discharging Unit Phone Number: [phone]    Emergency Contact:   Extended Emergency Contact Information  Primary Emergency Contact: [facility],[place]  [place] Phone: [phone]  Relation: Other    Past Surgical History:  Past Surgical History:   Procedure Laterality Date    ABOVE KNEE AMPUTATION  ,    both    BRONCHOSCOPY N/A 2020    BRONCHOSCOPY performed by Ian Escamilla MD at [facility]  2020    BRONCHOSCOPY W/EBUS FNA performed by Ian Escamilla MD at [place] [address]  4-14    IR PORT PLACEMENT EQUAL OR GREATER THAN 5 YEARS  10/1/2020    IR PORT PLACEMENT EQUAL OR GREATER THAN 5 YEARS 10/1/2020 [facility] SPECIAL PROCEDURES    TUNNELED VENOUS PORT PLACEMENT Right 10/01/2020    Power port inserted by Dr. Rolando Esqueda       Immunization History:   Immunization History   Administered Date(s) Administered    Tdap (Boostrix, Adacel) 2017       Active Problems:  Patient Active Problem List   Diagnosis Code    Type 2 diabetes mellitus with circulatory disorder ([facility] [address] E11.59    Mixed hyperlipidemia E78.2    Essential hypertension I10    Phantom limb syndrome with pain ([facility] [address] G54.6    Tobacco abuse Z72.0    History of substance abuse ([facility]) F19.11    Chronic pain syndrome G89.4    Lumbosacral spondylosis without myelopathy M47.817    Superior vena cava compression syndrome I87.1    Mediastinal mass J98.59       Isolation/Infection:   Isolation            No Isolation          Patient Infection Status       Infection Onset Added Last Indicated Last Indicated By Review Planned Expiration Resolved Resolved By    None active    Resolved    COVID-19 Rule Out 09/29/20 09/29/20 09/29/20 COVID-19 (Ordered)   09/29/20 Rule-Out Test Resulted            Nurse Assessment:  Last Vital Signs: BP (!) 176/47   Pulse 80   Temp 97.7 °F (36.5 °C) (Oral)   Resp 16   Ht 3' 6\" (1.067 m)   Wt 183 lb 13.8 oz (83.4 kg)   SpO2 97%   BMI 73.28 kg/m²     Last documented pain score (0-10 scale): Pain Level: 8  Last Weight:   Wt Readings from Last 1 Encounters:   10/03/20 183 lb 13.8 oz (83.4 kg)     Mental Status:  oriented and alert    IV Access:  - None    Nursing Mobility/ADLs:  Walking   Assisted  Transfer  Assisted  Bathing  Assisted  Dressing  Assisted  Toileting  Assisted  Feeding  Independent  Med Admin  Independent  Med Delivery   whole    Wound Care Documentation and Therapy:        Elimination:  Continence: Bowel: Yes  Bladder: Yes  Urinary Catheter: None   Colostomy/Ileostomy/Ileal Conduit: No       Date of Last BM: 10/3    Intake/Output Summary (Last 24 hours) at 10/4/2020 1025  Last data filed at 10/4/2020 1018  Gross per 24 hour   Intake 480 ml   Output 3650 ml   Net -3170 ml     I/O last 3 completed shifts: In: 240 [P.O.:240]  Out: 2950 [Urine:2950]    Safety Concerns:      At Risk for Falls    Impairments/Disabilities:      Bilateral Amputee    Nutrition Therapy:  Current Nutrition Therapy:   - Oral Diet:  General    Routes of Feeding: Oral  Liquids: No Restrictions  Daily Fluid Restriction: no  Last Modified Barium Swallow with Video (Video Swallowing Test): not done    Treatments at the Time of Hospital Discharge:   Respiratory Treatments: PRN O2  Oxygen Therapy:  is on oxygen at 2-4 L/min per nasal cannula. Ventilator:    - No ventilator support    Rehab Therapies: NA  Weight Bearing Status/Restrictions: No weight bearing restirctions  Other Medical Equipment (for information only, NOT a DME order):  wheelchair  Other Treatments: chemo    Patient's personal belongings (please select all that are sent with patient):  Dentures upper    RN SIGNATURE:  Electronically signed by Cristobal Chavez RN on 10/4/20 at 10:27 AM EDT    CASE MANAGEMENT/SOCIAL WORK SECTION    Inpatient Status Date: 9/28/2020    Readmission Risk Assessment Score:  Readmission Risk              Risk of Unplanned Readmission:        13           Discharging to Facility/ Agency   Name: Tiffanie mcgrath   Address:  Phone: [phone]  VFW:[phone]      / signature: Electronically signed by Kal Alejandro on 10/4/20 at 2:01 PM EDT    PHYSICIAN SECTION    Prognosis: Good    Condition at Discharge: Stable    Rehab Potential (if transferring to Rehab): Fair    Recommended Labs or Other Treatments After Discharge: CBC, BMP in 3 days, patiet needs nasal canula oxygen 24hrs 2L/min. Follow up with PCP    Physician Certification: I certify the above information and transfer of Logan Junior  is necessary for the continuing treatment of the diagnosis listed and that he requires [facility] for greater 30 days.      Update Admission H&P: No change in H&P    PHYSICIAN SIGNATURE:  Electronically signed by Mali Barger MD on 10/4/20 at 2:07 PM EDT

## 2020-10-04 NOTE — DISCHARGE SUMMARY
Hospital Medicine Discharge Summary    Patient ID: Sahra Crowe      Patient's PCP: Danielle Valentino MD    Admit Date: 9/28/2020     Discharge Date:      Admitting Physician: Judy Brown MD     Discharge Physician: Breanne Mayers MD     Discharge Diagnoses: Active Hospital Problems    Diagnosis Date Noted    Mediastinal mass [J98.59]     Superior vena cava compression syndrome [I87.1] 09/28/2020       The patient was seen and examined on day of discharge and this discharge summary is in conjunction with any daily progress note from day of discharge. Condition at discharge - stable    Hospital Course: Patient is a 59-year-old  male who presented to the hospital with a 2 through the progressively worsening anterior chest pain along with back pain that is associated with shortness of breath. Patient has a history of severe peripheral vascular disease status post bilateral above-the-knee amputations, type 2 diabetes, hypertension and dyslipidemia. In the emergency department a chest x-ray was performed which showed a possible right hilar mass. CT of the chest was then performed which showed a large 8.3 cm right suprahilar upper lobe pulmonary mass with compression of the hilar and paratracheal nodes and narrowing of the mid SVC segment with evidence of some degree of SVC obstruction. There is also concern for a right-sided locally advanced bronchogenic cancer. Pulmonology was consulted and will likely need bronchoscopy with biopsy. Hematology oncology also consulted. patient seen and evaluated on the day of discharge. Patient informed about following up with appointments. Patient verbalized understanding for follow-up appointments. All questions of the patient answered, patient is in agreement with the discharge plan. Medical reconciliation performed. Patient will be discharged stable condition.     Patient seen and evaluated, all questions of the patient answered, patient verbalized that he is stable for discharge and agrees with the discharge plan. All questions of the patient answered, patient had concern for oxygen nasal cannula, and has been put into [facility] instructions that patient needs oxygen. Patient is a 57-year-old male who presented to hospital with 2-day history of progressive worsening anterior chest pain along with back pain associated with shortness of breath. Patient has past medical history of PAD status post bilateral above-knee amputations, diabetes mellitus 2, hypertension, dyslipidemia.      Assessment  Superior vena cava syndrome  Right hilar lung mass, biopsy positive for small cell lung cancer  Severe PAD status post bilateral AKA  Diabetes mellitus 2  Hypertension  Hyperlipidemia  Tobacco dependence     Plan  Patient received chemotherapy for today, patient cleared for discharge by oncology. Patient's medical port is functional  Continue oxygen support as needed  Insulin sliding scale  Continue home clonidine, hydralazine, labetalol  Continue aspirin, statin  Continue nicotine patch  Continue Decadron per hematology  DVT Prophylaxis: Lovenox  Diet: DIET GENERAL;  Code Status: Full Code        Exam:     BP (!) 162/96   Pulse 82   Temp 97.9 °F (36.6 °C) (Oral)   Resp 16   Ht 3' 6\" (1.067 m)   Wt 183 lb 13.8 oz (83.4 kg)   SpO2 96%   BMI 73.28 kg/m²     General appearance: No apparent distress, appears stated age and cooperative. HEENT: Pupils equal, round, and reactive to light. Conjunctivae/corneas clear. Neck: Supple, with full range of motion. No jugular venous distention. Trachea midline. Respiratory:  Normal respiratory effort. Clear to auscultation, bilaterally without Rales/Wheezes/Rhonchi. Cardiovascular: Regular rate and rhythm with normal S1/S2  Abdomen: Soft, non-tender, non-distended with normal bowel sounds. Musculoskeletal: Bilateral AKA  Skin: Skin color, texture, turgor normal.  No rashes or lesions.   Neurologic: Neurovascularly intact without any focal sensory/motor deficits. Cranial nerves: II-XII intact, grossly non-focal.  Psychiatric: Alert and oriented, thought content appropriate, normal insight  Capillary Refill: Brisk,< 3 seconds   Peripheral Pulses: +2 palpable upper extremity, equal bilaterally        Consults:     IP CONSULT TO PULMONOLOGY  IP CONSULT TO ONCOLOGY  IP CONSULT TO SOCIAL WORK  IP CONSULT TO SOCIAL WORK  IP CONSULT TO PALLIATIVE CARE      Code Status:  Full Code    Activity: activity as tolerated    Labs: For convenience and continuity at follow-up the following most recent labs are provided:      CBC:    Lab Results   Component Value Date    WBC 7.2 10/02/2020    HGB 12.3 10/02/2020    HCT 36.0 10/02/2020     10/02/2020       Renal:    Lab Results   Component Value Date     10/04/2020    K 3.7 10/04/2020    K 4.1 10/02/2020    CL 97 10/04/2020    CO2 27 10/04/2020    BUN 6 10/04/2020    CREATININE <0.5 10/04/2020    CALCIUM 8.4 10/04/2020    PHOS 3.1 10/04/2020       Discharge Medications:     Current Discharge Medication List           Details   oxyCODONE (OXYCONTIN) 10 MG extended release tablet Take 1 tablet by mouth every 12 hours for 3 days.   Qty: 6 tablet, Refills: 0    Comments: Reduce doses taken as pain becomes manageable  Associated Diagnoses: Lung mass; Superior vena cava compression syndrome      allopurinol (ZYLOPRIM) 300 MG tablet Take 1 tablet by mouth daily  Qty: 30 tablet, Refills: 3      nicotine (NICODERM CQ) 14 MG/24HR Place 1 patch onto the skin daily  Qty: 30 patch, Refills: 3              Details   diphenhydrAMINE (BENADRYL) 25 MG tablet Take 12.5 mg by mouth every 6 hours as needed for Itching      hydrALAZINE (APRESOLINE) 25 MG tablet Take 25 mg by mouth 3 times daily      potassium chloride (MICRO-K) 10 MEQ extended release capsule Take 10 mEq by mouth daily      Lidocaine-Glycerin 5-14.4 % CREA Place rectally every 6 hours as needed (hemorroids)      labetalol (NORMODYNE) 300 MG tablet Take 1 tablet by mouth 2 times daily  Qty: 180 tablet, Refills: 5    Associated Diagnoses: Essential hypertension      cloNIDine (CATAPRES) 0.3 MG tablet Take 1 tablet by mouth 3 times daily  Qty: 180 tablet, Refills: 5    Associated Diagnoses: Essential hypertension      aspirin 325 MG tablet Take 1 tablet by mouth daily  Qty: 90 tablet, Refills: 5    Associated Diagnoses: Diabetes mellitus type 2, diet-controlled (HCC)      omeprazole (PRILOSEC) 20 MG delayed release capsule Take 1 capsule by mouth Daily  Qty: 90 capsule, Refills: 5    Associated Diagnoses: Gastroesophageal reflux disease without esophagitis      rosuvastatin (CRESTOR) 5 MG tablet Take 1 tablet by mouth nightly  Qty: 90 tablet, Refills: 5    Associated Diagnoses: Diabetes mellitus type 2, diet-controlled (HCC)      DULoxetine (CYMBALTA) 60 MG extended release capsule Take 1 capsule by mouth daily  Qty: 90 capsule, Refills: 5    Associated Diagnoses: Phantom limb syndrome with pain ([address] Anxiety and depression      escitalopram (LEXAPRO) 10 MG tablet TAKE ONE TABLET BY MOUTH DAILY  Qty: 30 tablet, Refills: 4             Time Spent on discharge is more than 30 mints in the examination, evaluation, counseling and review of medications and discharge plan. Signed:    Priscila Jalloh MD   10/4/2020      Thank you Heidi Cosme MD for the opportunity to be involved in this patient's care. If you have any questions or concerns please feel free to contact me at [phone].

## 2020-10-04 NOTE — CARE COORDINATION
10/4 discharge order noted--return to  [facility] w/ Gertrude Biggs at [facility] # [phone] - aware of discharge today  Report# [phone]  Fax# [phone]  Scripts faxed   Transport scheduled with [facility] for 4pm today  Patient aware and agreeable to plan.   Electronically signed by Janis Troy on 10/4/2020 at 1:55 PM

## 2020-10-04 NOTE — PROGRESS NOTES
Prn hydralazine and pain medication given as ordered.  Electronically signed by Alex Chase RN on 10/4/2020 at 4:55 AM

## 2020-10-04 NOTE — PLAN OF CARE
Problem: Skin Integrity:  Goal: Will show no infection signs and symptoms  Description: Will show no infection signs and symptoms  10/4/2020 1024 by Charu Burton RN  Outcome: Ongoing  Note: No chronic skin issues, will continue to monitor and assess.  Electronically signed by Joss Guzman RN on 10/4/2020 at 10:24 AM    10/3/2020 2316 by Tayo Stanton RN  Outcome: Ongoing  Goal: Absence of new skin breakdown  Description: Absence of new skin breakdown  10/4/2020 1024 by Joss Guzman RN  Outcome: Ongoing  10/3/2020 2316 by Tayo Stanton RN  Outcome: Ongoing

## 2020-10-04 NOTE — PROGRESS NOTES
Etoposide started, pt hopes to go back to SNF after infusion.  Electronically signed by Jhonny Hicks RN on 10/4/2020 at 10:24 AM

## 2020-10-04 NOTE — PROGRESS NOTES
Pt left via transport with all belongings, report called to [address] at [facility], no other needs.  Electronically signed by Stuart Braden RN on 10/4/2020 at 4:31 PM

## 2020-10-04 NOTE — PROGRESS NOTES
mg  10 mg Oral 2 times per day Abiel Nowak MD   10 mg at 10/03/20 2047    ceFAZolin (ANCEF) 2 g in dextrose 5 % 100 mL IVPB  2 g Intravenous 60 Min Pre-Op Hiltonjohn Haney MD   Stopped at 10/01/20 1615    nicotine (NICODERM CQ) 14 MG/24HR 1 patch  1 patch Transdermal Daily Abiel Nowak MD   1 patch at 10/02/20 2300    morphine (PF) injection 4 mg  4 mg Intravenous Q2H PRN Abiel Nowak MD   4 mg at 10/04/20 5437    cloNIDine (CATAPRES) tablet 0.3 mg  0.3 mg Oral TID Coyd Bear MD   0.3 mg at 10/03/20 2046    aspirin tablet 325 mg  325 mg Oral Daily Cody Bear MD   325 mg at 10/03/20 0911    DULoxetine (CYMBALTA) extended release capsule 60 mg  60 mg Oral Daily Cody Bear MD   60 mg at 10/03/20 0911    escitalopram (LEXAPRO) tablet 10 mg  10 mg Oral Daily Cody Bear MD   10 mg at 10/03/20 2451    hydrALAZINE (APRESOLINE) tablet 25 mg  25 mg Oral TID Cody Bear MD   25 mg at 10/03/20 2046    labetalol (NORMODYNE) tablet 300 mg  300 mg Oral BID Cody Bear MD   300 mg at 10/03/20 2046    rosuvastatin (CRESTOR) tablet 5 mg  5 mg Oral Nightly Cody Bear MD   5 mg at 10/03/20 1944    sodium chloride flush 0.9 % injection 10 mL  10 mL Intravenous 2 times per day Cody Bear MD   10 mL at 10/03/20 2047    sodium chloride flush 0.9 % injection 10 mL  10 mL Intravenous PRN Cody Bear MD   10 mL at 10/01/20 0239    promethazine (PHENERGAN) tablet 12.5 mg  12.5 mg Oral Q6H PRN Cody Bear MD        Or    ondansetron [facility]) injection 4 mg  4 mg Intravenous Q6H PRN Cody Bear MD        enoxaparin (LOVENOX) injection 40 mg  40 mg Subcutaneous Daily Cody Bear MD   40 mg at 10/03/20 4253    potassium chloride 10 mEq/100 mL IVPB (Peripheral Line)  10 mEq Intravenous PRN Cody Bear MD        magnesium sulfate 2 g in 50 mL IVPB premix  2 g Intravenous PRN Coyd Bear MD LABS:     CBC:   Lab Results   Component Value Date    WBC 7.2 10/02/2020    HGB 12.3 (L) 10/02/2020    HCT 36.0 (L) 10/02/2020    MCV 88.6 10/02/2020     10/02/2020    LYMPHOPCT 9.4 09/28/2020    RBC 4.07 (L) 10/02/2020    MCH 30.3 10/02/2020    MCHC 34.2 10/02/2020    RDW 14.2 10/02/2020    NEUTOPHILPCT 82.8 09/28/2020    MONOPCT 6.3 09/28/2020    BASOPCT 0.5 09/28/2020    NEUTROABS 8.5 (H) 09/28/2020    LYMPHSABS 1.0 09/28/2020    MONOSABS 0.6 09/28/2020    EOSABS 0.1 09/28/2020    BASOSABS 0.0 09/28/2020       CMP:   Lab Results   Component Value Date     10/03/2020    K 4.1 10/03/2020    K 4.1 10/02/2020    CL 97 10/03/2020    CO2 26 10/03/2020    BUN 7 10/03/2020    CREATININE <0.5 10/03/2020    GLUCOSE 124 10/03/2020    CALCIUM 8.3 10/03/2020    PROT 6.5 09/28/2020    LABALBU 4.3 09/28/2020    BILITOT 0.3 09/28/2020    ALKPHOS 127 09/28/2020    AST 12 09/28/2020    ALT 12 09/28/2020      Results for Betty Tobar (MRN [id number]) as of 10/4/2020 08:17   Ref. Range 10/3/2020 21:45   Uric Acid, Serum Latest Ref Range: 3.5 - 7.2 mg/dL 2.7 (L)   LD Latest Ref Range: 100 - 190 U/L 265 (H)       IMAGING:     None in past 24 hrs. ASSESSMENT:         Patient Active Problem List   Diagnosis Code    Type 2 diabetes mellitus with circulatory disorder ([facility] [address] E11.59    Mixed hyperlipidemia E78.2    Essential hypertension I10    Phantom limb syndrome with pain (HCC) G54.6    Tobacco abuse Z72.0    History of substance abuse ([facility] [address] F19.11    Chronic pain syndrome G89.4    Lumbosacral spondylosis without myelopathy M47.817    Superior vena cava compression syndrome I87.1    Mediastinal mass J98.59     Hypertension is fluctuating. No evidence of tumor lysis. PLAN:       Day 3-VP16 only.       Marcela Fernandez

## 2020-10-05 ENCOUNTER — CARE COORDINATION (OUTPATIENT)
Dept: CASE MANAGEMENT | Age: 60
End: 2020-10-05

## 2020-10-05 NOTE — CARE COORDINATION
[facility] Transitions Initial Follow Up Call    Call within 2 business days of discharge: Yes    Patient: Olga Huang Patient : 1960   MRN: <[id number]>  Reason for Admission: Chest Pain Lung Mass  Discharge Date: 10/4/20 RARS: Readmission Risk Score: 13      Last Discharge [facility]       Complaint Diagnosis Description Type Department Provider    20 Chest Pain Chest pain, unspecified type . .. ED to Hosp-Admission (Discharged) (ADMITTED) Leelee Woo MD; John Juarez... Spoke with: 1st Attempt to contact patient for follow up Covid 19 Care Transition call. Left message on Voice Mail to call office (number given) with any questions and an update on patient's condition since discharge. Will follow up at later time. ZHAO Bautista / [address] [facility] Transitions 24 Hour Call    Care Transitions Interventions         Follow Up  No future appointments.     August De Leon LPN

## 2020-10-14 ENCOUNTER — HOSPITAL ENCOUNTER (EMERGENCY)
Age: 60
Discharge: HOME | End: 2020-10-14
Attending: EMERGENCY MEDICINE
Payer: MEDICAID

## 2020-10-14 ENCOUNTER — APPOINTMENT (OUTPATIENT)
Dept: GENERAL RADIOLOGY | Age: 60
End: 2020-10-14
Payer: MEDICAID

## 2020-10-14 VITALS
DIASTOLIC BLOOD PRESSURE: 81 MMHG | RESPIRATION RATE: 16 BRPM | OXYGEN SATURATION: 100 % | TEMPERATURE: 98.7 F | SYSTOLIC BLOOD PRESSURE: 196 MMHG | HEART RATE: 72 BPM | BODY MASS INDEX: 70.65 KG/M2 | WEIGHT: 177.25 LBS

## 2020-10-14 LAB
A/G RATIO: 1.9 (ref 1.1–2.2)
ALBUMIN SERPL-MCNC: 4.3 G/DL (ref 3.4–5)
ALP BLD-CCNC: 179 U/L (ref 40–129)
ALT SERPL-CCNC: 24 U/L (ref 10–40)
ANION GAP SERPL CALCULATED.3IONS-SCNC: 12 MMOL/L (ref 3–16)
AST SERPL-CCNC: 16 U/L (ref 15–37)
ATYPICAL LYMPHOCYTE RELATIVE PERCENT: 1 % (ref 0–6)
BANDED NEUTROPHILS RELATIVE PERCENT: 20 % (ref 0–7)
BASOPHILS ABSOLUTE: 0.2 K/UL (ref 0–0.2)
BASOPHILS RELATIVE PERCENT: 2 %
BILIRUB SERPL-MCNC: <0.2 MG/DL (ref 0–1)
BUN BLDV-MCNC: 3 MG/DL (ref 7–20)
CALCIUM SERPL-MCNC: 8.9 MG/DL (ref 8.3–10.6)
CHLORIDE BLD-SCNC: 95 MMOL/L (ref 99–110)
CO2: 23 MMOL/L (ref 21–32)
CREAT SERPL-MCNC: 0.5 MG/DL (ref 0.8–1.3)
EKG ATRIAL RATE: 83 BPM
EKG DIAGNOSIS: NORMAL
EKG P-R INTERVAL: 166 MS
EKG Q-T INTERVAL: 394 MS
EKG QRS DURATION: 100 MS
EKG QTC CALCULATION (BAZETT): 462 MS
EKG R AXIS: -46 DEGREES
EKG T AXIS: 40 DEGREES
EKG VENTRICULAR RATE: 83 BPM
EOSINOPHILS ABSOLUTE: 0.1 K/UL (ref 0–0.6)
EOSINOPHILS RELATIVE PERCENT: 1 %
GFR AFRICAN AMERICAN: >60
GFR NON-AFRICAN AMERICAN: >60
GLOBULIN: 2.3 G/DL
GLUCOSE BLD-MCNC: 112 MG/DL (ref 70–99)
HCT VFR BLD CALC: 35.6 % (ref 40.5–52.5)
HEMOGLOBIN: 11.9 G/DL (ref 13.5–17.5)
LYMPHOCYTES ABSOLUTE: 0.8 K/UL (ref 1–5.1)
LYMPHOCYTES RELATIVE PERCENT: 6 %
MCH RBC QN AUTO: 29.8 PG (ref 26–34)
MCHC RBC AUTO-ENTMCNC: 33.4 G/DL (ref 31–36)
MCV RBC AUTO: 89.3 FL (ref 80–100)
METAMYELOCYTES RELATIVE PERCENT: 1 %
MONOCYTES ABSOLUTE: 0.6 K/UL (ref 0–1.3)
MONOCYTES RELATIVE PERCENT: 5 %
MYELOCYTE PERCENT: 2 %
NEUTROPHILS ABSOLUTE: 10.1 K/UL (ref 1.7–7.7)
NEUTROPHILS RELATIVE PERCENT: 62 %
PDW BLD-RTO: 14.9 % (ref 12.4–15.4)
PLATELET # BLD: 275 K/UL (ref 135–450)
PMV BLD AUTO: 7.3 FL (ref 5–10.5)
POTASSIUM REFLEX MAGNESIUM: 3.9 MMOL/L (ref 3.5–5.1)
PRO-BNP: 216 PG/ML (ref 0–124)
RBC # BLD: 3.99 M/UL (ref 4.2–5.9)
RBC # BLD: NORMAL 10*6/UL
SODIUM BLD-SCNC: 130 MMOL/L (ref 136–145)
TOTAL PROTEIN: 6.6 G/DL (ref 6.4–8.2)
TROPONIN: <0.01 NG/ML
VACUOLATED NEUTROPHILS: PRESENT
WBC # BLD: 11.9 K/UL (ref 4–11)

## 2020-10-14 PROCEDURE — 83880 ASSAY OF NATRIURETIC PEPTIDE: CPT

## 2020-10-14 PROCEDURE — 99285 EMERGENCY DEPT VISIT HI MDM: CPT

## 2020-10-14 PROCEDURE — 6370000000 HC RX 637 (ALT 250 FOR IP): Performed by: NURSE PRACTITIONER

## 2020-10-14 PROCEDURE — 93005 ELECTROCARDIOGRAM TRACING: CPT | Performed by: NURSE PRACTITIONER

## 2020-10-14 PROCEDURE — 80053 COMPREHEN METABOLIC PANEL: CPT

## 2020-10-14 PROCEDURE — 85025 COMPLETE CBC W/AUTO DIFF WBC: CPT

## 2020-10-14 PROCEDURE — 6360000002 HC RX W HCPCS: Performed by: NURSE PRACTITIONER

## 2020-10-14 PROCEDURE — 93010 ELECTROCARDIOGRAM REPORT: CPT | Performed by: INTERNAL MEDICINE

## 2020-10-14 PROCEDURE — 94640 AIRWAY INHALATION TREATMENT: CPT

## 2020-10-14 PROCEDURE — 84484 ASSAY OF TROPONIN QUANT: CPT

## 2020-10-14 PROCEDURE — 94664 DEMO&/EVAL PT USE INHALER: CPT

## 2020-10-14 PROCEDURE — 71045 X-RAY EXAM CHEST 1 VIEW: CPT

## 2020-10-14 RX ORDER — IPRATROPIUM BROMIDE AND ALBUTEROL SULFATE 2.5; .5 MG/3ML; MG/3ML
1 SOLUTION RESPIRATORY (INHALATION) ONCE
Status: COMPLETED | OUTPATIENT
Start: 2020-10-14 | End: 2020-10-14

## 2020-10-14 RX ORDER — NICOTINE 21 MG/24HR
1 PATCH, TRANSDERMAL 24 HOURS TRANSDERMAL DAILY
Qty: 14 PATCH | Refills: 1 | Status: ON HOLD | OUTPATIENT
Start: 2020-10-14 | End: 2020-10-22

## 2020-10-14 RX ORDER — CETIRIZINE HYDROCHLORIDE 10 MG/1
10 TABLET ORAL DAILY
Qty: 30 TABLET | Refills: 0 | Status: SHIPPED | OUTPATIENT
Start: 2020-10-14 | End: 2020-11-13

## 2020-10-14 RX ORDER — GUAIFENESIN, PSEUDOEPHEDRINE HYDROCHLORIDE 600; 60 MG/1; MG/1
1 TABLET, EXTENDED RELEASE ORAL EVERY 12 HOURS
Qty: 14 TABLET | Refills: 0 | Status: SHIPPED | OUTPATIENT
Start: 2020-10-14 | End: 2020-10-21

## 2020-10-14 RX ORDER — BENZONATATE 100 MG/1
100 CAPSULE ORAL 3 TIMES DAILY PRN
Qty: 20 CAPSULE | Refills: 0 | Status: SHIPPED | OUTPATIENT
Start: 2020-10-14

## 2020-10-14 RX ORDER — LORAZEPAM 1 MG/1
1 TABLET ORAL ONCE
Status: COMPLETED | OUTPATIENT
Start: 2020-10-14 | End: 2020-10-14

## 2020-10-14 RX ORDER — ALBUTEROL SULFATE 2.5 MG/3ML
2.5 SOLUTION RESPIRATORY (INHALATION) ONCE
Status: COMPLETED | OUTPATIENT
Start: 2020-10-14 | End: 2020-10-14

## 2020-10-14 RX ADMIN — IPRATROPIUM BROMIDE AND ALBUTEROL SULFATE 1 AMPULE: .5; 3 SOLUTION RESPIRATORY (INHALATION) at 02:13

## 2020-10-14 RX ADMIN — LORAZEPAM 1 MG: 1 TABLET ORAL at 02:08

## 2020-10-14 RX ADMIN — ALBUTEROL SULFATE 2.5 MG: 2.5 SOLUTION RESPIRATORY (INHALATION) at 02:10

## 2020-10-14 RX ADMIN — ALBUTEROL SULFATE 2.5 MG: 2.5 SOLUTION RESPIRATORY (INHALATION) at 02:05

## 2020-10-14 ASSESSMENT — ENCOUNTER SYMPTOMS
ABDOMINAL PAIN: 0
SHORTNESS OF BREATH: 1
COUGH: 1
BACK PAIN: 0

## 2020-10-14 ASSESSMENT — PAIN SCALES - GENERAL: PAINLEVEL_OUTOF10: 0

## 2020-10-14 NOTE — ED NOTES
Handoff report given to Luis Miguel Braswell RN (charge) to assume care at this time.       Shahram Kitchen RN  10/14/20 2716

## 2020-10-14 NOTE — ED NOTES
Handoff report given to Monique, [address] [facility]. [place], [facility] working on transportation back to [place] [address]. [employer].       Arlyn Doe RN  10/14/20 0345

## 2020-10-14 NOTE — ED NOTES
Bed: B-05  Expected date:   Expected time:   Means of arrival: [facility]  Comments:  TOSHIA Nuñez RN  10/14/20 7935

## 2020-10-14 NOTE — ED PROVIDER NOTES
[address] [facility]        Pt Name: Armani Keller  MRN: [id number]  [place] 1960  Date of evaluation: 10/14/2020  Provider: MARIELENA Monaco - CNP  PCP: Keyshawn Eid MD     I have seen and evaluated this patient with my supervising physician Adam Amador MD.    CHIEF COMPLAINT       Chief Complaint   Patient presents with    Shortness of Breath     increase SOB when laying flat x 1hr; recently diagnosed with lung cancer       HISTORY OF PRESENT ILLNESS   (Location, Timing/Onset, Context/Setting, Quality, Duration, Modifying Factors, Severity, Associated Signs and Symptoms)  Note limiting factors. Armani Keller is a 62 y.o. male with PMH significant for HLD, HTN, PVD, DM, cigarette smoking, and lung mass who presents to the emergency department today via EMS from nursing home complaining of orthopnea. Onset was tonight. No cough or fever. No chest pain or tightness. Nursing Notes were all reviewed and agreed with or any disagreements were addressed in the HPI. REVIEW OF SYSTEMS    (2-9 systems for level 4, 10 or more for level 5)     Review of Systems   Constitutional: Negative for chills, diaphoresis and fever. HENT: Negative. Respiratory: Positive for cough and shortness of breath. Cardiovascular: Negative for chest pain. Gastrointestinal: Negative for abdominal pain. Musculoskeletal: Negative for back pain. Skin: Negative for pallor and rash. Allergic/Immunologic: Negative for immunocompromised state. Neurological: Negative for dizziness, syncope and headaches. Psychiatric/Behavioral: Negative for confusion. All other systems reviewed and are negative. Positives and Pertinent negatives as per HPI. Except as noted above in the ROS, all other systems were reviewed and negative.        PAST MEDICAL HISTORY     Past Medical History:   Diagnosis Date    Anxiety     Heart disease     Hyperlipidemia     triglycerides    Hypertension     Peripheral vascular disease, unspecified ([facility] [address]     bilateral AKA amputation    Phantom limb (syndrome)     Type II or unspecified type diabetes mellitus without mention of complication, not stated as uncontrolled          SURGICAL HISTORY     Past Surgical History:   Procedure Laterality Date    ABOVE KNEE AMPUTATION  2009,2010    both    BRONCHOSCOPY N/A 9/29/2020    BRONCHOSCOPY performed by Mirian Mcdaniels MD at [place] [address]  9/29/2020    BRONCHOSCOPY W/EBUS FNA performed by Mirian Mcdaniels MD at [place]  4-14    IR PORT PLACEMENT EQUAL OR GREATER THAN 5 YEARS  10/1/2020    IR PORT PLACEMENT EQUAL OR GREATER THAN 5 YEARS 10/1/2020 [facility] PROCEDURES    TUNNELED VENOUS PORT PLACEMENT Right 10/01/2020    Power port inserted by Dr. Thalia Bernal       Discharge Medication List as of 10/14/2020  5:53 AM      CONTINUE these medications which have NOT CHANGED    Details   allopurinol (ZYLOPRIM) 300 MG tablet Take 1 tablet by mouth daily, Disp-30 tablet,R-3Print      !! nicotine (NICODERM CQ) 14 MG/24HR Place 1 patch onto the skin daily, Disp-30 patch,R-3Print      diphenhydrAMINE (BENADRYL) 25 MG tablet Take 12.5 mg by mouth every 6 hours as needed for ItchingHistorical Med      hydrALAZINE (APRESOLINE) 25 MG tablet Take 25 mg by mouth 3 times dailyHistorical Med      potassium chloride (MICRO-K) 10 MEQ extended release capsule Take 10 mEq by mouth dailyHistorical Med      Lidocaine-Glycerin 5-14.4 % CREA Place rectally every 6 hours as needed (hemorroids)Historical Med      labetalol (NORMODYNE) 300 MG tablet Take 1 tablet by mouth 2 times daily, Disp-180 tablet, R-5Normal      cloNIDine (CATAPRES) 0.3 MG tablet Take 1 tablet by mouth 3 times daily, Disp-180 tablet, R-5Normal      aspirin 325 MG tablet Take 1 tablet by mouth daily, Disp-90 tablet, R-5Normal      omeprazole Conjunctivae normal.   Neck:      Musculoskeletal: Normal range of motion and neck supple. Vascular: No JVD. Cardiovascular:      Rate and Rhythm: Normal rate and regular rhythm. Heart sounds: Normal heart sounds. Pulmonary:      Effort: Pulmonary effort is normal. Tachypnea present. No respiratory distress. Breath sounds: Normal air entry. Decreased breath sounds present. Comments: Patient seems very anxious with tachypnea. I do not appreciate any adventitious breath sounds, or I think patient is anxious vs in respiratory distress  Abdominal:      General: There is no distension. Palpations: Abdomen is soft. Abdomen is not rigid. Tenderness: There is no abdominal tenderness. Musculoskeletal: Normal range of motion. Comments: Bilateral AKA   Skin:     General: Skin is warm and dry. Capillary Refill: Capillary refill takes less than 2 seconds. Findings: No rash. Neurological:      General: No focal deficit present. Mental Status: He is alert and oriented to person, place, and time. Psychiatric:         Mood and Affect: Mood is anxious.          DIAGNOSTIC RESULTS   LABS:    Labs Reviewed   CBC WITH AUTO DIFFERENTIAL - Abnormal; Notable for the following components:       Result Value    WBC 11.9 (*)     RBC 3.99 (*)     Hemoglobin 11.9 (*)     Hematocrit 35.6 (*)     Neutrophils Absolute 10.1 (*)     Lymphocytes Absolute 0.8 (*)     Bands Relative 20 (*)     Metamyelocytes Relative 1 (*)     Myelocyte Percent 2 (*)     Vacuolated Neutrophils Present (*)     All other components within normal limits    Narrative:     Performed at:  [address] [address]   Phone [phone]   COMPREHENSIVE METABOLIC PANEL W/ REFLEX TO MG FOR LOW K - Abnormal; Notable for the following components:    Sodium 130 (*)     Chloride 95 (*)     Glucose 112 (*)     BUN 3 (*)     CREATININE 0.5 (*)     Alkaline Phosphatase 179 (*)     All other components within normal limits    Narrative:     Performed at:  [facility]  [address] [place] [facility] [address]   Phone [phone]   BRAIN NATRIURETIC PEPTIDE - Abnormal; Notable for the following components:    Pro- (*)     All other components within normal limits    Narrative:     Performed at:  [facility]  [address] [place] [address]   Phone [phone]   TROPONIN    Narrative:     Performed at:  [facility]  [address] [place] [facility] [address]   Phone [phone]       All other labs were within normal range or not returned as of this dictation. EKG: All EKG's are interpreted by the Emergency Department Physician in the absence of a cardiologist.  Please see their note for interpretation of EKG. RADIOLOGY:   Non-plain film images such as CT, Ultrasound and MRI are read by the radiologist. Plain radiographic images are visualized and preliminarily interpreted by the ED Provider with the below findings:        Interpretation per the Radiologist below, if available at the time of this note:    XR CHEST PORTABLE   Final Result   1. No acute airspace disease. 2. Right hilar/perihilar mass again seen. No results found.         PROCEDURES   Unless otherwise noted below, none     Procedures    CRITICAL CARE TIME   N/A    CONSULTS:  None      EMERGENCY DEPARTMENT COURSE and DIFFERENTIAL DIAGNOSIS/MDM:   Vitals:    Vitals:    10/14/20 0205 10/14/20 0210 10/14/20 0215 10/14/20 0526   BP:       Pulse:    72   Resp: 18   16   Temp:    98.7 °F (37.1 °C)   TempSrc:    Oral   SpO2: 98% 100% 100% 100%   Weight:           Patient was given the following medications:  Medications   LORazepam (ATIVAN) tablet 1 mg (1 mg Oral Given 10/14/20 0208)   albuterol (PROVENTIL) nebulizer solution 2.5 mg (2.5 mg Nebulization Given 10/14/20 0210) DISCONTINUED MEDICATIONS:  Discharge Medication List as of 10/14/2020  5:53 AM                 (Please note that portions of this note were completed with a voice recognition program.  Efforts were made to edit the dictations but occasionally words are mis-transcribed.)    MARIELENA Branch CNP (electronically signed)           MARIELENA Branch CNP  10/14/20 1823

## 2020-10-15 ENCOUNTER — CARE COORDINATION (OUTPATIENT)
Dept: CARE COORDINATION | Age: 60
End: 2020-10-15

## 2020-10-15 NOTE — CARE COORDINATION
Patient contacted regarding recent discharge and COVID-19 risk. Discussed COVID-19 related testing which was not done at this time. Test results were not done. Patient informed of results, if available? No     Care Transition Nurse/ Ambulatory Care Manager contacted the patient by telephone to perform post discharge assessment. Verified name and  with patient as identifiers. Patient has following risk factors of: diabetes. CTN/ACM reviewed discharge instructions, medical action plan and red flags related to discharge diagnosis. Reviewed and educated them on any new and changed medications related to discharge diagnosis. Advised obtaining a 90-day supply of all daily and as-needed medications. Education provided regarding infection prevention, and signs and symptoms of COVID-19 and when to seek medical attention with patient who verbalized understanding. Discussed exposure protocols and quarantine from [address] you at higher risk for severe illness  and given an opportunity for questions and concerns. The patient agrees to contact the COVID-19 hotline [phone] or PCP office for questions related to their healthcare. CTN/ACM provided contact information for future reference. From CDC: Are you at higher risk for severe illness?  Wash your hands often.  Avoid close contact (6 feet, which is about two arm lengths) with people who are sick.  Put distance between yourself and other people if COVID-19 is spreading in your community.  Clean and disinfect frequently touched surfaces.  Avoid all cruise travel and non-essential air travel.  Call your healthcare professional if you have concerns about COVID-19 and your underlying condition or if you are sick. For more information on steps you can take to protect yourself, see CDC's How to [address] [place] with pt briefly, states he is feeling \" much better, \" no trouuble breathing states he is back at nursing home .  No further issues or concerns.   No further out reaches as pt resides in [facility]

## 2020-10-22 ENCOUNTER — APPOINTMENT (OUTPATIENT)
Dept: GENERAL RADIOLOGY | Age: 60
DRG: 134 | End: 2020-10-22
Payer: MEDICAID

## 2020-10-22 ENCOUNTER — APPOINTMENT (OUTPATIENT)
Dept: CT IMAGING | Age: 60
DRG: 134 | End: 2020-10-22
Payer: MEDICAID

## 2020-10-22 ENCOUNTER — HOSPITAL ENCOUNTER (INPATIENT)
Age: 60
LOS: 1 days | Discharge: NURSING, REHAB OR LONG-TERM CARE | DRG: 134 | End: 2020-10-23
Attending: EMERGENCY MEDICINE | Admitting: STUDENT IN AN ORGANIZED HEALTH CARE EDUCATION/TRAINING PROGRAM
Payer: MEDICAID

## 2020-10-22 ENCOUNTER — APPOINTMENT (OUTPATIENT)
Dept: MRI IMAGING | Age: 60
DRG: 134 | End: 2020-10-22
Payer: MEDICAID

## 2020-10-22 PROBLEM — J18.9 ATYPICAL PNEUMONIA: Status: ACTIVE | Noted: 2020-10-22

## 2020-10-22 PROBLEM — J18.9 PNEUMONIA: Status: ACTIVE | Noted: 2020-10-22

## 2020-10-22 PROBLEM — C34.90 LUNG CANCER (HCC): Status: ACTIVE | Noted: 2020-10-22

## 2020-10-22 PROBLEM — I26.99 PULMONARY EMBOLUS (HCC): Status: ACTIVE | Noted: 2020-10-22

## 2020-10-22 LAB
A/G RATIO: 1.5 (ref 1.1–2.2)
ALBUMIN SERPL-MCNC: 3.9 G/DL (ref 3.4–5)
ALP BLD-CCNC: 149 U/L (ref 40–129)
ALT SERPL-CCNC: 24 U/L (ref 10–40)
ANION GAP SERPL CALCULATED.3IONS-SCNC: 14 MMOL/L (ref 3–16)
ANION GAP SERPL CALCULATED.3IONS-SCNC: 16 MMOL/L (ref 3–16)
APTT: 29.8 SEC (ref 24.2–36.2)
APTT: 38.4 SEC (ref 24.2–36.2)
APTT: 51 SEC (ref 24.2–36.2)
AST SERPL-CCNC: 19 U/L (ref 15–37)
BASOPHILS ABSOLUTE: 0.1 K/UL (ref 0–0.2)
BASOPHILS RELATIVE PERCENT: 0.3 %
BILIRUB SERPL-MCNC: 0.4 MG/DL (ref 0–1)
BUN BLDV-MCNC: 3 MG/DL (ref 7–20)
BUN BLDV-MCNC: 4 MG/DL (ref 7–20)
CALCIUM SERPL-MCNC: 8.9 MG/DL (ref 8.3–10.6)
CALCIUM SERPL-MCNC: 9 MG/DL (ref 8.3–10.6)
CHLORIDE BLD-SCNC: 83 MMOL/L (ref 99–110)
CHLORIDE BLD-SCNC: 84 MMOL/L (ref 99–110)
CHLORIDE URINE RANDOM: <20 MMOL/L
CO2: 21 MMOL/L (ref 21–32)
CO2: 24 MMOL/L (ref 21–32)
CREAT SERPL-MCNC: <0.5 MG/DL (ref 0.8–1.3)
CREAT SERPL-MCNC: <0.5 MG/DL (ref 0.8–1.3)
EKG ATRIAL RATE: 83 BPM
EKG DIAGNOSIS: NORMAL
EKG P AXIS: 53 DEGREES
EKG P-R INTERVAL: 192 MS
EKG Q-T INTERVAL: 394 MS
EKG QRS DURATION: 104 MS
EKG QTC CALCULATION (BAZETT): 462 MS
EKG R AXIS: 2 DEGREES
EKG T AXIS: 29 DEGREES
EKG VENTRICULAR RATE: 83 BPM
EOSINOPHILS ABSOLUTE: 0 K/UL (ref 0–0.6)
EOSINOPHILS RELATIVE PERCENT: 0.1 %
GFR AFRICAN AMERICAN: >60
GFR AFRICAN AMERICAN: >60
GFR NON-AFRICAN AMERICAN: >60
GFR NON-AFRICAN AMERICAN: >60
GLOBULIN: 2.6 G/DL
GLUCOSE BLD-MCNC: 121 MG/DL (ref 70–99)
GLUCOSE BLD-MCNC: 134 MG/DL (ref 70–99)
HCT VFR BLD CALC: 30.7 % (ref 40.5–52.5)
HCT VFR BLD CALC: 33.1 % (ref 40.5–52.5)
HEMOGLOBIN: 10.6 G/DL (ref 13.5–17.5)
HEMOGLOBIN: 11.3 G/DL (ref 13.5–17.5)
LYMPHOCYTES ABSOLUTE: 0.7 K/UL (ref 1–5.1)
LYMPHOCYTES RELATIVE PERCENT: 4.1 %
MAGNESIUM: 1.9 MG/DL (ref 1.8–2.4)
MCH RBC QN AUTO: 29.4 PG (ref 26–34)
MCH RBC QN AUTO: 29.7 PG (ref 26–34)
MCHC RBC AUTO-ENTMCNC: 34.1 G/DL (ref 31–36)
MCHC RBC AUTO-ENTMCNC: 34.6 G/DL (ref 31–36)
MCV RBC AUTO: 86.1 FL (ref 80–100)
MCV RBC AUTO: 86.3 FL (ref 80–100)
MONOCYTES ABSOLUTE: 1 K/UL (ref 0–1.3)
MONOCYTES RELATIVE PERCENT: 5.9 %
NEUTROPHILS ABSOLUTE: 15.1 K/UL (ref 1.7–7.7)
NEUTROPHILS RELATIVE PERCENT: 89.6 %
OSMOLALITY URINE: 124 MOSM/KG (ref 390–1070)
OSMOLALITY: 252 MOSM/KG (ref 280–301)
PDW BLD-RTO: 15.1 % (ref 12.4–15.4)
PDW BLD-RTO: 15.1 % (ref 12.4–15.4)
PLATELET # BLD: 385 K/UL (ref 135–450)
PLATELET # BLD: 399 K/UL (ref 135–450)
PMV BLD AUTO: 6.9 FL (ref 5–10.5)
PMV BLD AUTO: 7.2 FL (ref 5–10.5)
POTASSIUM REFLEX MAGNESIUM: 4.1 MMOL/L (ref 3.5–5.1)
POTASSIUM SERPL-SCNC: 4.6 MMOL/L (ref 3.5–5.1)
POTASSIUM, UR: 17.9 MMOL/L
PRO-BNP: 305 PG/ML (ref 0–124)
RBC # BLD: 3.57 M/UL (ref 4.2–5.9)
RBC # BLD: 3.83 M/UL (ref 4.2–5.9)
SARS-COV-2, NAAT: NOT DETECTED
SODIUM BLD-SCNC: 121 MMOL/L (ref 136–145)
SODIUM BLD-SCNC: 121 MMOL/L (ref 136–145)
SODIUM URINE: <20 MMOL/L
TOTAL PROTEIN: 6.5 G/DL (ref 6.4–8.2)
TROPONIN: <0.01 NG/ML
WBC # BLD: 16.7 K/UL (ref 4–11)
WBC # BLD: 16.8 K/UL (ref 4–11)

## 2020-10-22 PROCEDURE — 93010 ELECTROCARDIOGRAM REPORT: CPT | Performed by: INTERNAL MEDICINE

## 2020-10-22 PROCEDURE — 70553 MRI BRAIN STEM W/O & W/DYE: CPT

## 2020-10-22 PROCEDURE — 84133 ASSAY OF URINE POTASSIUM: CPT

## 2020-10-22 PROCEDURE — 84484 ASSAY OF TROPONIN QUANT: CPT

## 2020-10-22 PROCEDURE — 2500000003 HC RX 250 WO HCPCS: Performed by: EMERGENCY MEDICINE

## 2020-10-22 PROCEDURE — 87040 BLOOD CULTURE FOR BACTERIA: CPT

## 2020-10-22 PROCEDURE — 6360000002 HC RX W HCPCS: Performed by: EMERGENCY MEDICINE

## 2020-10-22 PROCEDURE — A9577 INJ MULTIHANCE: HCPCS | Performed by: INTERNAL MEDICINE

## 2020-10-22 PROCEDURE — 2580000003 HC RX 258: Performed by: STUDENT IN AN ORGANIZED HEALTH CARE EDUCATION/TRAINING PROGRAM

## 2020-10-22 PROCEDURE — 80053 COMPREHEN METABOLIC PANEL: CPT

## 2020-10-22 PROCEDURE — 85027 COMPLETE CBC AUTOMATED: CPT

## 2020-10-22 PROCEDURE — 71045 X-RAY EXAM CHEST 1 VIEW: CPT

## 2020-10-22 PROCEDURE — 6360000004 HC RX CONTRAST MEDICATION: Performed by: EMERGENCY MEDICINE

## 2020-10-22 PROCEDURE — 2580000003 HC RX 258: Performed by: EMERGENCY MEDICINE

## 2020-10-22 PROCEDURE — 82436 ASSAY OF URINE CHLORIDE: CPT

## 2020-10-22 PROCEDURE — 71260 CT THORAX DX C+: CPT

## 2020-10-22 PROCEDURE — 36415 COLL VENOUS BLD VENIPUNCTURE: CPT

## 2020-10-22 PROCEDURE — 83935 ASSAY OF URINE OSMOLALITY: CPT

## 2020-10-22 PROCEDURE — 6360000002 HC RX W HCPCS: Performed by: STUDENT IN AN ORGANIZED HEALTH CARE EDUCATION/TRAINING PROGRAM

## 2020-10-22 PROCEDURE — 84300 ASSAY OF URINE SODIUM: CPT

## 2020-10-22 PROCEDURE — 83880 ASSAY OF NATRIURETIC PEPTIDE: CPT

## 2020-10-22 PROCEDURE — 99285 EMERGENCY DEPT VISIT HI MDM: CPT

## 2020-10-22 PROCEDURE — U0002 COVID-19 LAB TEST NON-CDC: HCPCS

## 2020-10-22 PROCEDURE — 83735 ASSAY OF MAGNESIUM: CPT

## 2020-10-22 PROCEDURE — 6360000004 HC RX CONTRAST MEDICATION: Performed by: INTERNAL MEDICINE

## 2020-10-22 PROCEDURE — 1200000000 HC SEMI PRIVATE

## 2020-10-22 PROCEDURE — 93005 ELECTROCARDIOGRAM TRACING: CPT | Performed by: EMERGENCY MEDICINE

## 2020-10-22 PROCEDURE — 85730 THROMBOPLASTIN TIME PARTIAL: CPT

## 2020-10-22 PROCEDURE — 83930 ASSAY OF BLOOD OSMOLALITY: CPT

## 2020-10-22 PROCEDURE — 85025 COMPLETE CBC W/AUTO DIFF WBC: CPT

## 2020-10-22 PROCEDURE — 99255 IP/OBS CONSLTJ NEW/EST HI 80: CPT | Performed by: INTERNAL MEDICINE

## 2020-10-22 RX ORDER — HEPARIN SODIUM 10000 [USP'U]/100ML
18.4 INJECTION, SOLUTION INTRAVENOUS CONTINUOUS
Status: DISCONTINUED | OUTPATIENT
Start: 2020-10-22 | End: 2020-10-23 | Stop reason: HOSPADM

## 2020-10-22 RX ORDER — ALBUTEROL SULFATE 2.5 MG/3ML
2.5 SOLUTION RESPIRATORY (INHALATION) EVERY 6 HOURS PRN
Status: DISCONTINUED | OUTPATIENT
Start: 2020-10-22 | End: 2020-10-23 | Stop reason: HOSPADM

## 2020-10-22 RX ORDER — SODIUM CHLORIDE 9 MG/ML
INJECTION, SOLUTION INTRAVENOUS CONTINUOUS
Status: DISCONTINUED | OUTPATIENT
Start: 2020-10-22 | End: 2020-10-23

## 2020-10-22 RX ORDER — HEPARIN SODIUM 1000 [USP'U]/ML
6700 INJECTION, SOLUTION INTRAVENOUS; SUBCUTANEOUS ONCE
Status: COMPLETED | OUTPATIENT
Start: 2020-10-22 | End: 2020-10-22

## 2020-10-22 RX ORDER — HEPARIN SODIUM 1000 [USP'U]/ML
80 INJECTION, SOLUTION INTRAVENOUS; SUBCUTANEOUS PRN
Status: DISCONTINUED | OUTPATIENT
Start: 2020-10-22 | End: 2020-10-22

## 2020-10-22 RX ORDER — SODIUM CHLORIDE 0.9 % (FLUSH) 0.9 %
10 SYRINGE (ML) INJECTION PRN
Status: DISCONTINUED | OUTPATIENT
Start: 2020-10-22 | End: 2020-10-23 | Stop reason: HOSPADM

## 2020-10-22 RX ORDER — POLYETHYLENE GLYCOL 3350 17 G/17G
17 POWDER, FOR SOLUTION ORAL DAILY PRN
Status: DISCONTINUED | OUTPATIENT
Start: 2020-10-22 | End: 2020-10-23 | Stop reason: HOSPADM

## 2020-10-22 RX ORDER — ACETAMINOPHEN 650 MG/1
650 SUPPOSITORY RECTAL EVERY 6 HOURS PRN
Status: DISCONTINUED | OUTPATIENT
Start: 2020-10-22 | End: 2020-10-23 | Stop reason: HOSPADM

## 2020-10-22 RX ORDER — HEPARIN SODIUM 1000 [USP'U]/ML
40 INJECTION, SOLUTION INTRAVENOUS; SUBCUTANEOUS PRN
Status: DISCONTINUED | OUTPATIENT
Start: 2020-10-22 | End: 2020-10-22

## 2020-10-22 RX ORDER — ACETAMINOPHEN 325 MG/1
650 TABLET ORAL EVERY 6 HOURS PRN
Status: DISCONTINUED | OUTPATIENT
Start: 2020-10-22 | End: 2020-10-23 | Stop reason: HOSPADM

## 2020-10-22 RX ORDER — HEPARIN SODIUM 1000 [USP'U]/ML
3300 INJECTION, SOLUTION INTRAVENOUS; SUBCUTANEOUS ONCE
Status: COMPLETED | OUTPATIENT
Start: 2020-10-22 | End: 2020-10-22

## 2020-10-22 RX ORDER — SODIUM CHLORIDE 0.9 % (FLUSH) 0.9 %
10 SYRINGE (ML) INJECTION EVERY 12 HOURS SCHEDULED
Status: DISCONTINUED | OUTPATIENT
Start: 2020-10-22 | End: 2020-10-23 | Stop reason: HOSPADM

## 2020-10-22 RX ORDER — ONDANSETRON 2 MG/ML
4 INJECTION INTRAMUSCULAR; INTRAVENOUS EVERY 6 HOURS PRN
Status: DISCONTINUED | OUTPATIENT
Start: 2020-10-22 | End: 2020-10-23 | Stop reason: HOSPADM

## 2020-10-22 RX ADMIN — IOPAMIDOL 75 ML: 755 INJECTION, SOLUTION INTRAVENOUS at 03:33

## 2020-10-22 RX ADMIN — HEPARIN SODIUM 15 ML/HR: 10000 INJECTION, SOLUTION INTRAVENOUS at 05:15

## 2020-10-22 RX ADMIN — SODIUM CHLORIDE: 9 INJECTION, SOLUTION INTRAVENOUS at 04:14

## 2020-10-22 RX ADMIN — HEPARIN SODIUM 16.7 ML/HR: 10000 INJECTION, SOLUTION INTRAVENOUS at 23:07

## 2020-10-22 RX ADMIN — SODIUM CHLORIDE: 9 INJECTION, SOLUTION INTRAVENOUS at 11:47

## 2020-10-22 RX ADMIN — PIPERACILLIN AND TAZOBACTAM 3.38 G: 3; .375 INJECTION, POWDER, LYOPHILIZED, FOR SOLUTION INTRAVENOUS at 05:38

## 2020-10-22 RX ADMIN — HEPARIN SODIUM 3300 UNITS: 1000 INJECTION INTRAVENOUS; SUBCUTANEOUS at 13:02

## 2020-10-22 RX ADMIN — GADOBENATE DIMEGLUMINE 18 ML: 529 INJECTION, SOLUTION INTRAVENOUS at 10:44

## 2020-10-22 RX ADMIN — PIPERACILLIN AND TAZOBACTAM 3.38 G: 3; .375 INJECTION, POWDER, LYOPHILIZED, FOR SOLUTION INTRAVENOUS at 20:25

## 2020-10-22 RX ADMIN — HEPARIN SODIUM 6700 UNITS: 1000 INJECTION INTRAVENOUS; SUBCUTANEOUS at 05:15

## 2020-10-22 RX ADMIN — PIPERACILLIN AND TAZOBACTAM 3.38 G: 3; .375 INJECTION, POWDER, LYOPHILIZED, FOR SOLUTION INTRAVENOUS at 13:39

## 2020-10-22 ASSESSMENT — PAIN SCALES - GENERAL
PAINLEVEL_OUTOF10: 0
PAINLEVEL_OUTOF10: 0

## 2020-10-22 NOTE — CONSULTS
Patient is seen at the request of Dr. Irene Batres for lung cancer    PCP: Kusum Boyd MD    HISTORY OF PRESENT ILLNESS: 62y.o. year old male with recently diagnosed small cell lung cancer who was admitted on 10/22/20 for shortness of breath. He reports that he was sleeping and awoke with the acute onset of severe shortness of breath this morning. He says this sensation quickly resolved on its own. Chest CT in the ER showed a RLL pulmonary embolus and increase in the right hilar/mediastinal mass. Patient reports a chronic \"smoker's cough,\" but says that has not changed. He smokes 1.5ppd.      PAST MEDICAL HISTORY:  Past Medical History:   Diagnosis Date    Anxiety     Heart disease     Hyperlipidemia     triglycerides    Hypertension     Peripheral vascular disease, unspecified ([address]     bilateral AKA amputation    Phantom limb (syndrome)     Type II or unspecified type diabetes mellitus without mention of complication, not stated as uncontrolled        PAST SURGICAL HISTORY:  Past Surgical History:   Procedure Laterality Date    ABOVE KNEE AMPUTATION  2009,2010    both    BRONCHOSCOPY N/A 9/29/2020    BRONCHOSCOPY performed by Gabriela Hernandez MD at [address] [place] [address]  9/29/2020    BRONCHOSCOPY W/EBUS FNA performed by Gabriela Henrandez MD at [place] [address]    IR PORT PLACEMENT EQUAL OR GREATER THAN 5 YEARS  10/1/2020    IR PORT PLACEMENT EQUAL OR GREATER THAN 5 YEARS 10/1/2020 [facility] PROCEDURES    TUNNELED VENOUS PORT PLACEMENT Right 10/01/2020    Power port inserted by Dr. Caitlin Arias:  Current Facility-Administered Medications: 0.9 % sodium chloride infusion, , Intravenous, Continuous  heparin 25,000 unit in sodium chloride 0.45% 250 mL infusion, 15 mL/hr, Intravenous, Continuous  sodium chloride flush 0.9 % injection 10 mL, 10 mL, Intravenous, 2 times per day  sodium chloride flush 0.9 % injection 10 mL, 10 mL, Intravenous, PRN  acetaminophen (TYLENOL) tablet 650 mg, 650 mg, Oral, Q6H PRN **OR** acetaminophen (TYLENOL) suppository 650 mg, 650 mg, Rectal, Q6H PRN  polyethylene glycol (GLYCOLAX) packet 17 g, 17 g, Oral, Daily PRN  ondansetron (ZOFRAN) injection 4 mg, 4 mg, Intravenous, Q6H PRN  piperacillin-tazobactam (ZOSYN) 3.375 g in dextrose 5 % 100 mL IVPB extended infusion (mini-bag), 3.375 g, Intravenous, Q8H      ALLERGIES:  Patient is allergic to niacin and related and statins [statins]. FAMILY HISTORY:  family history includes Arthritis in his mother; Cancer in his father and sister; Diabetes in his father and sister; Heart Disease in his father and mother; High Blood Pressure in his brother, father, mother, and sister; High Cholesterol in his father and mother.     SOCIAL HISTORY:  Social History     Socioeconomic History    Marital status: Single     Spouse name: Not on file    Number of children: Not on file    Years of education: Not on file    Highest education level: Not on file   Occupational History    Occupation: Disabled     Comment: 2007   Social Needs    Financial resource strain: Not on file    Food insecurity     Worry: Not on file     Inability: Not on file   nContact Surgical needs     Medical: Not on file     Non-medical: Not on file   Tobacco Use    Smoking status: Current Every Day Smoker     Packs/day: 2.00     Years: 40.00     Pack years: 80.00     Types: Cigarettes    Smokeless tobacco: Never Used   Substance and Sexual Activity    Alcohol use: Not Currently     Alcohol/week: 1.0 - 2.0 standard drinks     Types: 1 - 2 Shots of liquor per week    Drug use: Yes     Frequency: 1.0 times per week     Types: Marijuana     Comment: 2 x week    Sexual activity: Never   Lifestyle    Physical activity     Days per week: Not on file     Minutes per session: Not on file    Stress: Not on file   Relationships    Social connections     Talks on phone: Not on file     Gets together: Not on file Attends Hindu service: Not on file     Active member of club or organization: Not on file     Attends meetings of clubs or organizations: Not on file     Relationship status: Not on file    Intimate partner violence     Fear of current or ex partner: Not on file     Emotionally abused: Not on file     Physically abused: Not on file     Forced sexual activity: Not on file   Other Topics Concern    Not on file   Social History Narrative    Not on file      reports that he has been smoking cigarettes. He has a 80.00 pack-year smoking history. He has never used smokeless tobacco.    REVIEW OF SYSTEMS:  Constitutional: Negative for fever  HENT: Negative for sore throat  Eyes: Negative for redness   Respiratory: Negative for dyspnea (resovled), +chronic cough  Cardiovascular: Negative for chest pain  Gastrointestinal: Negative for vomiting, diarrhea   Genitourinary: Negative for hematuria   Musculoskeletal: Negative for arthralgias   Skin: Negative for rash  Neurological: Negative for syncope  Hematological: Negative for adenopathy  Psychiatric/Behavorial: Negative for anxiety    Objective:   PHYSICAL EXAM:  Blood pressure (!) 155/87, pulse 91, temperature 97.3 °F (36.3 °C), temperature source Oral, resp. rate 16, weight 184 lb 4.9 oz (83.6 kg), SpO2 97 %. on 4L NC    Gen: Well developed; well nourished  Eyes: No scleral icterus. No conjunctival injection. ENT:  Oropharynx clear. External appearance of ears and nose normal.  Neck: Trachea midline. No obvious mass. No visible thyroid enlargement    Respiratory: Clear to auscultation bilaterally, no accessory muscle use  Cardiovascular: Regular rate and rhythm, no appreciable murmurs. No edema. Gastrointestinal: Soft, non-tender. No hernia  Skin: Warm and dry. No rashes or ulcers on visible areas. Normal texture and turgor  Lymphatic: No cervical LAD. No supraclavicular LAD. Musculoskeletal: No cyanosis or clubbing.  Bilateral AKA  Psychiatric: Normal mood and Shortness of breath  -His episode of shortness of breath this morning seems to have been due to SVC syndrome. Treatment per oncology      Thank you for allowing me to participate in the care of this patient. Will sign off. Please call with any questions or concerns.     Emilio Da Silva MD  [facility] Pulmonology, Critical Care and Sleep

## 2020-10-22 NOTE — ED NOTES
ED SBAR report provider to Charmaine Pack RN. Patient to be transported to Room 4133 via stretcher by RN. Patient transported with bedside cardiac monitor and with IV medications infusing. IV site clean, dry, and intact. MEWS score and pain assessed as 1 and documented. Updated patient on plan of care.      Luis Alberto Johnson RN  10/22/20 2913

## 2020-10-22 NOTE — CARE COORDINATION
10/22 Patient is from [place]. [facility]. The patient will need a negative COVID to return to the facility. No precert required.  Electronically signed by Chaparro Van RN on 10/22/2020 at 1:50 PM

## 2020-10-22 NOTE — ED NOTES
Spoke with Deb Barnes nurse at [facility] to let her know that patient was being admitted and why.      Jere Marie RN  10/22/20 2481

## 2020-10-22 NOTE — ED TRIAGE NOTES
Fall risk screening completed. Fall risk bracelet applied to patient. Non-skid socks provided and placed on patient. The fall risk is indicated using  dome light . Based on score, a bed alarm was indicated and applied. The call light is within the patient's reach, and instructions for use were provided. The bed is in the lowest position with wheels locked. The patient has been advised to notify staff, using the call light, if there is a need to get up or use restroom. The patient verbalized understanding of safety precautions and how to contact staff for assistance.

## 2020-10-22 NOTE — ED NOTES
Pt unable to be transported to floor until rapid covid results are available. Pt resting comfortably in bed. No needs at this time. Will continue to monitor.      Osmin Tian RN  10/22/20 4209

## 2020-10-22 NOTE — DISCHARGE INSTR - COC
myelopathy M47.817    Superior vena cava compression syndrome I87.1    Mediastinal mass J98.59    Pulmonary embolus (HCC) I26.99    Lung cancer (HCC) C34.90    Atypical pneumonia J18.9    Pneumonia J18.9    Pulmonary infiltrate R91.8    Centrilobular emphysema (HCC) J43.2    SOB (shortness of breath) R06.02       Isolation/Infection:   Isolation          No Isolation        Patient Infection Status     Infection Onset Added Last Indicated Last Indicated By Review Planned Expiration Resolved Resolved By    None active    Resolved    COVID-19 Rule Out 10/22/20 10/22/20 10/22/20 COVID-19 (Ordered)   10/22/20 Rule-Out Test Resulted    COVID-19 Rule Out 09/29/20 09/29/20 09/29/20 COVID-19 (Ordered)   09/29/20 Rule-Out Test Resulted          Nurse Assessment:  Last Vital Signs: BP (!) 155/87   Pulse 91   Temp 97.3 °F (36.3 °C) (Oral)   Resp 16   Wt 184 lb 4.9 oz (83.6 kg)   SpO2 97%   BMI 73.46 kg/m²     Last documented pain score (0-10 scale): Pain Level: 0  Last Weight:   Wt Readings from Last 1 Encounters:   10/22/20 184 lb 4.9 oz (83.6 kg)     Mental Status:  {IP PT MENTAL STATUS:20030}    IV Access:  { TIMUR IV ACCESS:453293100}    Nursing Mobility/ADLs:  Walking   {CHP DME EYTJ:113119204}  Transfer  {CHP DME BEUO:545922815}  Bathing  {CHP DME USIH:557233069}  Dressing  {CHP DME ANYS:880848134}  Toileting  {CHP DME RTZZ:127755007}  Feeding  {CHP DME PMYO:474638673}  Med Admin  {P DME XUGW:293009926}  Med Delivery   { TIMUR MED Delivery:882827095}    Wound Care Documentation and Therapy:        Elimination:  Continence:   · Bowel: {YES / HC:30670}  · Bladder: {YES / DJ:06246}  Urinary Catheter: {Urinary Catheter:349024726}   Colostomy/Ileostomy/Ileal Conduit: {YES / DD:50781}       Date of Last BM: ***    Intake/Output Summary (Last 24 hours) at 10/22/2020 1421  Last data filed at 10/22/2020 1341  Gross per 24 hour   Intake --   Output 1250 ml   Net -1250 ml     No intake/output data recorded.     Safety Concerns:     [address] TIMUR Safety Concerns:225861105}    Impairments/Disabilities:      [address] [facility] Impairments/Disabilities:660548188}    Nutrition Therapy:  Current Nutrition Therapy:   [address] [facility] Diet List:439975329}    Routes of Feeding: {CHP DME Other Feedings:320819701}  Liquids: {Slp liquid thickness:52740}  Daily Fluid Restriction: {CHP DME Yes amt example:569746737}  Last Modified Barium Swallow with Video (Video Swallowing Test): {Done Not Done OPVH:283234546}    Treatments at the Time of Hospital Discharge:   Respiratory Treatments: ***  Oxygen Therapy:  {Therapy; copd oxygen:98088}  Ventilator:    {[facility] Vent JOSÉ:947157096}    Rehab Therapies: {THERAPEUTIC INTERVENTION:5324917521}  Weight Bearing Status/Restrictions: [address]  Weight Bearin}  Other Medical Equipment (for information only, NOT a DME order):  {EQUIPMENT:013812700}  Other Treatments: ***    Patient's personal belongings (please select all that are sent with patient):  {CHP DME Belongings:661141844}    RN SIGNATURE:  {Esignature:338929287}    CASE MANAGEMENT/SOCIAL WORK SECTION    Inpatient Status Date: 10/22/20    Readmission Risk Assessment Score:  Readmission Risk              Risk of Unplanned Readmission:        18           Discharging to Facility/ Agency   · Name: [facility]  · Address: [place] [address]  · Phone:[phone]  · CFI:[phone]    Dialysis Facility (if applicable)   · Name:  · Address:  · Dialysis Schedule:  · Phone:  · Fax:    / signature: Electronically signed by Elver Hedrick RN on 10/22/20 at 2:23 PM EDT    PHYSICIAN SECTION    Prognosis: {Prognosis:9213355891}    Condition at Discharge: [address] Patient Condition:448168089}    Rehab Potential (if transferring to Rehab): {Prognosis:2135567693}    Recommended Labs or Other Treatments After Discharge: ***    Physician Certification: I certify the above information and transfer of Raymond Montoya  is necessary for the continuing treatment of the diagnosis listed and that he requires {Admit to Appropriate Level of Care:15427} for {GREATER/LESS:811348668} 30 days.      Update Admission H&P: {CHP DME Changes in VRU:865158771}    PHYSICIAN SIGNATURE:  {Esignature:459374340}

## 2020-10-22 NOTE — ED PROVIDER NOTES
[facility]  eMERGENCY dEPARTMENT eNCOUnter      Pt Name: Yobany Brito  MRN: [id number]  [place] 1960  Date of evaluation: 10/22/2020  Provider: Bonnie Medellin MD  PCP: Tayo Perales MD      [address] [facility]       Chief Complaint   Patient presents with    Shortness of Breath     patient in by [facility] ems from [facility], woke up out of his sleep with SOB, new dx of lung cancer, smoker. wears 3L/NC baseline, 4L/NC at this time       HISTORY OFPRESENT ILLNESS   (Location/Symptom, Timing/Onset, Context/Setting, Quality, Duration, Modifying Factors,Severity)  Note limiting factors. Yobany Brito is a 62 y.o. male notes lung cancer had a port placed [facility]. woke up out of his sleep feeling short of breath he is a smoker he wears 3 L nasal cannula baseline they bumped him up to 4 L he is not feeling short of breath at this time. Patient has bilateral Above Knee Amputations    Nursing Notes were all reviewed and agreed with or any disagreements were addressed  in the HPI. REVIEW OF SYSTEMS    (2-9 systems for level 4, 10 or more for level 5)     Review of Systems    Positives and Pertinent negatives as per HPI. Except as noted above in the ROS, all other systems were reviewed andnegative.        PASTMEDICAL HISTORY     Past Medical History:   Diagnosis Date    Anxiety     Heart disease     Hyperlipidemia     triglycerides    Hypertension     Peripheral vascular disease, unspecified ([address]     bilateral AKA amputation    Phantom limb (syndrome)     Type II or unspecified type diabetes mellitus without mention of complication, not stated as uncontrolled          SURGICAL HISTORY       Past Surgical History:   Procedure Laterality Date    ABOVE KNEE AMPUTATION  [id number]    both    BRONCHOSCOPY N/A 9/29/2020    BRONCHOSCOPY performed by Geovanny An MD at [address] [place] [address]  9/29/2020 BRONCHOSCOPY W/EBUS FNA performed by Michael Tolbert MD at [place]  4-14    IR PORT PLACEMENT EQUAL OR GREATER THAN 5 YEARS  10/1/2020    IR PORT PLACEMENT EQUAL OR GREATER THAN 5 YEARS 10/1/2020 [facility]    TUNNELED VENOUS PORT PLACEMENT Right 10/01/2020    Power port inserted by Dr. Arun Morfin       Previous Medications    ALLOPURINOL (ZYLOPRIM) 300 MG TABLET    Take 1 tablet by mouth daily    ASPIRIN 325 MG TABLET    Take 1 tablet by mouth daily    BENZONATATE (TESSALON PERLES) 100 MG CAPSULE    Take 1 capsule by mouth 3 times daily as needed for Cough    CETIRIZINE (ZYRTEC) 10 MG TABLET    Take 1 tablet by mouth daily    CLONIDINE (CATAPRES) 0.3 MG TABLET    Take 1 tablet by mouth 3 times daily    DIPHENHYDRAMINE (BENADRYL) 25 MG TABLET    Take 12.5 mg by mouth every 6 hours as needed for Itching    DULOXETINE (CYMBALTA) 60 MG EXTENDED RELEASE CAPSULE    Take 1 capsule by mouth daily    ESCITALOPRAM (LEXAPRO) 10 MG TABLET    TAKE ONE TABLET BY MOUTH DAILY    HYDRALAZINE (APRESOLINE) 25 MG TABLET    Take 25 mg by mouth 3 times daily    LABETALOL (NORMODYNE) 300 MG TABLET    Take 1 tablet by mouth 2 times daily    LIDOCAINE-GLYCERIN 5-14.4 % CREA    Place rectally every 6 hours as needed (hemorroids)    NICOTINE (NICODERM CQ) 14 MG/24HR    Place 1 patch onto the skin daily    NICOTINE (NICODERM CQ) 14 MG/24HR    Place 1 patch onto the skin daily for 14 days    OMEPRAZOLE (PRILOSEC) 20 MG DELAYED RELEASE CAPSULE    Take 1 capsule by mouth Daily    POTASSIUM CHLORIDE (MICRO-K) 10 MEQ EXTENDED RELEASE CAPSULE    Take 10 mEq by mouth daily    ROSUVASTATIN (CRESTOR) 5 MG TABLET    Take 1 tablet by mouth nightly       ALLERGIES     Niacin and related and Statins [statins]    FAMILY HISTORY       Family History   Problem Relation Age of Onset    Heart Disease Mother     High Cholesterol Mother     High Blood Pressure Mother     Arthritis Mother     Heart Disease Father     Cancer Father         lung    Diabetes Father     High Cholesterol Father     High Blood Pressure Father     Cancer Sister     High Blood Pressure Sister     Diabetes Sister     High Blood Pressure Brother           SOCIAL HISTORY       Social History     Socioeconomic History    Marital status: Single     Spouse name: None    Number of children: None    Years of education: None    Highest education level: None   Occupational History    Occupation: Disabled     Comment: 2007   Social Needs    Financial resource strain: None    Food insecurity     Worry: None     Inability: None    Transportation needs     Medical: None     Non-medical: None   Tobacco Use    Smoking status: Current Every Day Smoker     Packs/day: 2.00     Years: 40.00     Pack years: 80.00     Types: Cigarettes    Smokeless tobacco: Never Used   Substance and Sexual Activity    Alcohol use: Not Currently     Alcohol/week: 1.0 - 2.0 standard drinks     Types: 1 - 2 Shots of liquor per week    Drug use: Yes     Frequency: 1.0 times per week     Types: Marijuana     Comment: 2 x week    Sexual activity: Never   Lifestyle    Physical activity     Days per week: None     Minutes per session: None    Stress: None   Relationships    Social connections     Talks on phone: None     Gets together: None     Attends Mu-ism service: None     Active member of club or organization: None     Attends meetings of clubs or organizations: None     Relationship status: None    Intimate partner violence     Fear of current or ex partner: None     Emotionally abused: None     Physically abused: None     Forced sexual activity: None   Other Topics Concern    None   Social History Narrative    None       SCREENINGS      @FLOW([id number])@      PHYSICAL EXAM    (up to 7 for level 4, 8 or more for level 5)     ED Triage Vitals [10/22/20 0146]   BP Temp Temp src Pulse Resp SpO2 Height Weight   -- 97.9 °F (36.6 °C) components:    Pro- (*)     All other components within normal limits    Narrative:     Performed at:  [facility]  [address] [facility] [address]   Phone [phone]   TROPONIN    Narrative:     Performed at:  [facility]  [address] [facility] [address]   Phone [phone]       All other labs were within normal range or not returned as of this dictation. EKG: All EKG's are interpreted by the Emergency Department Physician who eithersigns or Co-signs this chart in the absence of a cardiologist.    The Ekg interpreted by me in the absence of a cardiologist shows. Normal Sinus rhythm   Rate of   78  Axis is   Normal  QTc is  normal  Intervals and Durations are unremarkable. Nonspecific ST-T wave changes appreciated. No evidence of acute ischemia. RADIOLOGY:   Non-plain film images such as CT, Ultrasound and MRI are read by the radiologist. Plain radiographic images are visualized by myself. *    Interpretation per the Radiologist below, if available at the time of this note:    CT CHEST PULMONARY EMBOLISM W CONTRAST   Final Result   Subsegmental acute pulmonary embolism in the right lower lobe posterior basal   branch. Interval increase in size of infiltrative right hilar/mediastinal mass with   extrinsic compression/invasion of bronchovascular structures, as described. Metastatic right hilar and mediastinal lymph nodes. Ground-glass halo has developed in the right upper lobe along the   distribution of mass, either representing lymphatic/venous congestion,   locoregional disease spread or postobstructive infection. More nodular   opacities in the right upper lobe are suspicious for the latter as well. Critical results were called by Dr. Kurtis Chow to [facility] on 10/22/2020 at 04:07.          XR CHEST PORTABLE   Final Result   Small bilateral MD  10/22/20 1356 Tiffani Caldwell MD  10/22/20 1138

## 2020-10-22 NOTE — H&P
Hospital Medicine History & Physical      PCP: Kusum Boyd MD    Date of Admission: 10/22/2020    Date of Service: 10/22/2020    Chief Complaint:  SOB     History Of Present Illness: The patient is a 62 y.o. male with recently diagnosed small cell lung cancer on chemo, HTN, DM2 who presents to [facility] with SOB. He wears 3L oxygen at baseline, but was SOB at nursing facility and prsented to the ED for evaluation. In the ED, he was on 4L oxygen, but vital signs otherwise wnl. Labs showed a WBC count of 16 and sodium of 1    Past Medical History:        Diagnosis Date    Anxiety     Heart disease     Hyperlipidemia     triglycerides    Hypertension     Peripheral vascular disease, unspecified ([facility] [address]     bilateral AKA amputation    Phantom limb (syndrome)     Type II or unspecified type diabetes mellitus without mention of complication, not stated as uncontrolled        Past Surgical History:        Procedure Laterality Date    ABOVE KNEE AMPUTATION  2009,2010    both    BRONCHOSCOPY N/A 9/29/2020    BRONCHOSCOPY performed by [facility]riela [facility] MD at [facility]  9/29/2020    BRONCHOSCOPY W/EBUS FNA performed by Gabriela Hernandez MD at [place]  4-14    IR PORT PLACEMENT EQUAL OR GREATER THAN 5 YEARS  10/1/2020    IR PORT PLACEMENT EQUAL OR GREATER THAN 5 YEARS 10/1/2020 [facility]    TUNNELED VENOUS PORT PLACEMENT Right 10/01/2020    Power port inserted by Dr. Edwige Schaeffer       Medications Prior to Admission:    Prior to Admission medications    Medication Sig Start Date End Date Taking?  Authorizing Provider   benzonatate (TESSALON PERLES) 100 MG capsule Take 1 capsule by mouth 3 times daily as needed for Cough 10/14/20   David Winston MD   cetirizine (ZYRTEC) 10 MG tablet Take 1 tablet by mouth daily 10/14/20 11/13/20  Jarvis Huynh MD   nicotine (NICODERM CQ) 14 MG/24HR Place 1 patch onto the skin daily for 14 days 10/14/20 10/28/20  Jarvis Huynh MD   allopurinol (ZYLOPRIM) 300 MG tablet Take 1 tablet by mouth daily 10/5/20   Noble Soriano MD   nicotine (NICODERM CQ) 14 MG/24HR Place 1 patch onto the skin daily 10/5/20   Noble Soriano MD   diphenhydrAMINE (BENADRYL) 25 MG tablet Take 12.5 mg by mouth every 6 hours as needed for Itching    Historical Provider, MD   hydrALAZINE (APRESOLINE) 25 MG tablet Take 25 mg by mouth 3 times daily    Historical Provider, MD   potassium chloride (MICRO-K) 10 MEQ extended release capsule Take 10 mEq by mouth daily    Historical Provider, MD   Lidocaine-Glycerin 5-14.4 % CREA Place rectally every 6 hours as needed (hemorroids)    Historical Provider, MD   labetalol (NORMODYNE) 300 MG tablet Take 1 tablet by mouth 2 times daily 2/13/20   Lawanda Ann MD   cloNIDine (CATAPRES) 0.3 MG tablet Take 1 tablet by mouth 3 times daily 2/13/20   Lawanda Ann MD   aspirin 325 MG tablet Take 1 tablet by mouth daily 2/13/20   Lawanda Ann MD   omeprazole (PRILOSEC) 20 MG delayed release capsule Take 1 capsule by mouth Daily 2/13/20   Lawanda Ann MD   rosuvastatin (CRESTOR) 5 MG tablet Take 1 tablet by mouth nightly 2/13/20   Lawanda Ann MD   DULoxetine (CYMBALTA) 60 MG extended release capsule Take 1 capsule by mouth daily 2/13/20   Lawanda Ann MD   escitalopram (LEXAPRO) 10 MG tablet TAKE ONE TABLET BY MOUTH DAILY 12/23/19   Lawanda Ann MD       Allergies:  Niacin and related and Statins [statins]    Social History:  The patient currently lives at nursing facility    TOBACCO:   reports that he has been smoking cigarettes. He has a 80.00 pack-year smoking history. He has never used smokeless tobacco.  ETOH:   reports previous alcohol use of about 1.0 - 2.0 standard drinks of alcohol per week.       Family History:  Reviewed in detail and negative for DM, Early CAD, Cancer, CVA. Positive as follows:        Problem Relation Age of Onset    Heart Disease Mother     High Cholesterol Mother     High Blood Pressure Mother     Arthritis Mother     Heart Disease Father     Cancer Father         lung    Diabetes Father     High Cholesterol Father     High Blood Pressure Father     Cancer Sister     High Blood Pressure Sister     Diabetes Sister     High Blood Pressure Brother        REVIEW OF SYSTEMS:   Positive for SOB and as noted in the HPI. All other systems reviewed and negative. PHYSICAL EXAM:    BP (!) 155/87   Pulse 91   Temp 97.3 °F (36.3 °C) (Oral)   Resp 16   Wt 184 lb 4.9 oz (83.6 kg)   SpO2 97%   BMI 73.46 kg/m²     General appearance: No apparent distress appears stated age and cooperative. HEENT Normal cephalic, atraumatic without obvious deformity. Pupils equal, round, and reactive to light. Extra ocular muscles intact. Conjunctivae/corneas clear. Neck: Supple, No jugular venous distention/bruits. Trachea midline without thyromegaly or adenopathy with full range of motion. Lungs: Clear to auscultation, bilaterally without Rales/Wheezes/Rhonchi with good respiratory effort. Heart: Regular rate and rhythm with Normal S1/S2 without murmurs, rubs or gallops, point of maximum impulse non-displaced  Abdomen: Soft, non-tender or non-distended without rigidity or guarding and positive bowel sounds all four quadrants. Extremities: No clubbing, cyanosis, or edema bilaterally. Full range of motion without deformity and normal gait intact except bilateral AKA's  Skin: Skin color, texture, turgor normal.  No rashes or lesions. Neurologic: Alert and oriented X 3, neurovascularly intact with sensory/motor intact upper extremities/lower extremities, bilaterally. Cranial nerves: II-XII intact, grossly non-focal.  Mental status: Alert, oriented, thought content appropriate.   Capillary Refill: Acceptable  < 3 seconds  Peripheral Pulses: +3 Easily felt, not easily obliterated with pressure      CBC   Recent Labs     10/22/20  0211 10/22/20  0446   WBC 16.8* 16.7*   HGB 11.3* 10.6*   HCT 33.1* 30.7*    385      RENAL  Recent Labs     10/22/20  0211   *   K 4.1   CL 83*   CO2 24   BUN 3*   CREATININE <0.5*     LFT'S  Recent Labs     10/22/20  0211   AST 19   ALT 24   BILITOT 0.4   ALKPHOS 149*     COAG  No results for input(s): INR in the last 72 hours. CARDIAC ENZYMES  Recent Labs     10/22/20  0211   TROPONINI <0.01       U/A:    Lab Results   Component Value Date    COLORU YELLOW 02/29/2020    WBCUA 1 02/29/2020    RBCUA 4 02/29/2020    CLARITYU Clear 02/29/2020    SPECGRAV 1.016 02/29/2020    LEUKOCYTESUR Negative 02/29/2020    BLOODU Negative 02/29/2020    GLUCOSEU Negative 02/29/2020       ABG  No results found for: WQI9YJX, BEART, Z2NGAPMQ, PHART, THGBART, ZOA3WLQ, PO2ART, VFK1WEV        Active Hospital Problems    Diagnosis Date Noted    Pulmonary embolus ([facility] [address] [I26.99] 10/22/2020    Lung cancer ([facility] [address] [C34.90] 10/22/2020    Atypical pneumonia [J18.9] 10/22/2020    Pneumonia [J18.9] 10/22/2020         PHYSICIANS CERTIFICATION:    I certify that Denisse Marquez is expected to be hospitalized for more than 2 midnights based on the following assessment and plan:      ASSESSMENT/PLAN:    Small cell lung cancer:  - Started chemo a few weeks ago  - defer to oncology if needs radiation for SOB    SOB:  - Consult Heme Onc and pulmonary to r/o SVC syndome as contribution to SOB  - pulmonary consulted to determine if other cause for SOB besides cancer  - zosyn for potential pneumonia     Hyponatremia:  - acute on chronic  - likely due to small cell lung cancer  - consult nephrology       DVT Prophylaxis: lovenox  Diet: DIET GENERAL;  Code Status: Full Code  PT/OT Eval Status: Geno Melendrez MD    Thank you George Acharya MD for the opportunity to be involved in this patient's care. If you have any questions or concerns please feel free to contact me at [phone].

## 2020-10-22 NOTE — CONSULTS
[facility]    Consult Note      Requesting Physician: Cassius Pritchett  CHIEF COMPLAINT: sob      HISTORY OF PRESENT ILLNESS:      Mr. Stephie Calderon  is a 62 y.o. male we are seeing in consultation for Known small cell lung cancer  HIs stage at dx-at least it was confined to the chest and he had near svc syndrome at dx  We were unable to complete brain mri at initiall presentation due to pts pain and inabililty to migdalia flat  We saw him outpt and he got neualsta -as he had his first inpt chemo and then a week or so after and he tolerated chemo well   HE noted improved pain and is now able to lay better  HE came in due to sudden sob and was found to have a pe  And possible post obstructive findings  He was started on anticoagulation and abx  HE was oriented when I saw him today   HE seemes to be comfortable  His sodium was noted to be low but his mental status is reasonable     Past Medical History:        Diagnosis Date    Anxiety     Heart disease     Hyperlipidemia     triglycerides    Hypertension     Peripheral vascular disease, unspecified ([facility] [address]     bilateral AKA amputation    Phantom limb (syndrome)     Type II or unspecified type diabetes mellitus without mention of complication, not stated as uncontrolled      Past Surgical History:        Procedure Laterality Date    ABOVE KNEE AMPUTATION  2009,2010    both    BRONCHOSCOPY N/A 9/29/2020    BRONCHOSCOPY performed by Katherine Perry MD at [address]  9/29/2020    BRONCHOSCOPY W/EBUS FNA performed by Katherine Perry MD at [place]  4-14    IR PORT [id number] [facility]. 5 YEARS  10/1/2020    IR PORT PLACEMENT EQUAL OR GREATER THAN 5 YEARS 10/1/2020 [facility] SPECIAL PROCEDURES    TUNNELED VENOUS PORT PLACEMENT Right 10/01/2020    Power port inserted by Dr. Abelardo Burns       Current Medications:    Current Facility-Administered Medications: 0.9 % sodium chloride infusion, , Active member of club or organization: Not on file     Attends meetings of clubs or organizations: Not on file     Relationship status: Not on file    Intimate partner violence     Fear of current or ex partner: Not on file     Emotionally abused: Not on file     Physically abused: Not on file     Forced sexual activity: Not on file   Other Topics Concern    Not on file   Social History Narrative    Not on file          Family History:         Problem Relation Age of Onset    Heart Disease Mother     High Cholesterol Mother     High Blood Pressure Mother     Arthritis Mother     Heart Disease Father     Cancer Father         lung    Diabetes Father     High Cholesterol Father     High Blood Pressure Father     Cancer Sister     High Blood Pressure Sister     Diabetes Sister     High Blood Pressure Brother      REVIEW OF SYSTEMS:    ROS per the HPI   Otherwise  10 point ROS negative     PHYSICAL EXAM:      Vitals:  BP (!) 155/87   Pulse 91   Temp 97.3 °F (36.3 °C) (Oral)   Resp 16   Wt 184 lb 4.9 oz (83.6 kg)   SpO2 97%   BMI 73.46 kg/m²     CONSTITUTIONAL:  awake, alert, cooperative, no apparent distress, and appears stated age NAD  EYES:  pupils equal, round and reactive to light, extra ocular muscles intact, sclera clear, conjunctiva normal  NECK:  Supple, symmetrical, trachea midline, no adenopathy, thyroid symmetric, not enlarged and no tenderness, skin normal  HEMATOLOGIC/LYMPHATICS:  no cervical lymphadenopathy, no supraclavicular lymphadenopathyLUNGS:  No increased work of breathing, good air exchange, clear to auscultation bilaterally, no crackles or wheezing  CARDIOVASCULAR:  , regular rate and rhythm, normal S1 and S2, no S3 or S4, and no murmur noted  ABDOMEN:  No scars, normal bowel sounds, soft, non-distended, non-tender, no masses palpated, no hepatosplenomegally      MUSCULOSKELETAL:  There is no redness, warmth, or swelling of the joints.    Bilateral amuptee at thigh level pancreas unremarkable. Hepatic excreted contrast in the gallbladder GI/Bowel: No gastrointestinal abnormality. No mesenteric adenopathy Pelvis: Urinary bladder unremarkable. No pelvic adenopathy. Fat containing bilateral inguinal hernias Peritoneum/Retroperitoneum: No retroperitoneal adenopathy. No ascites or peritoneal tumor. Atherosclerosis of normal caliber abdominal aorta Bones/Soft Tissues: No suspicious bone lesion     No evidence of abdominopelvic metastatic disease     Xr Chest Portable    Result Date: 10/22/2020  EXAMINATION: ONE XRAY VIEW OF THE CHEST 10/22/2020 1:50 am COMPARISON: Chest radiographs 10/14/2020, 10/03/2020; CT chest 09/28/2020 HISTORY: ORDERING SYSTEM PROVIDED HISTORY: SOB TECHNOLOGIST PROVIDED HISTORY: Reason for exam:->SOB Reason for Exam: sob FINDINGS: Unchanged right perihilar convexity/masslike opacity. No new focal airspace disease. No pneumothorax. Blunted left costophrenic sulcus. Indistinct right costophrenic sulcus. Elevated right hemidiaphragm with colonic interposition. Stable heart size. Right chest port catheter tip terminates over the SVC. Small bilateral pleural effusions with unchanged right hilar mass. Colonic interposition may be seen with Chilaiditi syndrome in the appropriate context. Xr Chest Portable    Result Date: 10/14/2020  EXAMINATION: ONE XRAY VIEW OF THE CHEST 10/14/2020 1:48 am COMPARISON: 10/03/2020 HISTORY: ORDERING SYSTEM PROVIDED HISTORY: SOB TECHNOLOGIST PROVIDED HISTORY: Reason for exam:->SOB Reason for Exam: sob Acuity: Acute Relevant Medical/Surgical History: hx diabetes, hypertension FINDINGS: Right chest port is again noted. Right hilar/perihilar mass appears slightly improved though this may be due to differences in patient positioning. There is no acute airspace disease. The heart size is normal.  There is a small left pleural effusion. There is no discernible pneumothorax. 1. No acute airspace disease.  2. Right hilar/perihilar mass again seen. Xr Chest Portable    Result Date: 10/3/2020  EXAMINATION: ONE XRAY VIEW OF THE CHEST 10/3/2020 9:40 am COMPARISON: Chest x-ray dated 09/28/2020 HISTORY: ORDERING SYSTEM PROVIDED HISTORY: verify port ok to use for chemo TECHNOLOGIST PROVIDED HISTORY: Reason for exam:->verify port ok to use for chemo Reason for Exam: verify port ok to use for chemo Acuity: Acute Type of Exam: Ongoing FINDINGS: LINES/TUBES/OTHER: Right IJ port is noted with its tip in the distal SVC. HEART/MEDIASTINUM: The cardiomediastinal silhouette is within normal limits. PLEURA/LUNGS: There are no focal consolidations or pleural effusions. There is no appreciable pneumothorax. BONES/SOFT TISSUE: No acute abnormality. Right IJ port tip in appropriate position. Xr Chest Portable    Result Date: 9/28/2020  EXAMINATION: ONE XRAY VIEW OF THE CHEST 9/28/2020 5:35 pm COMPARISON: 02/29/2020 HISTORY: ORDERING SYSTEM PROVIDED HISTORY: Chest pain TECHNOLOGIST PROVIDED HISTORY: Reason for exam:->Chest pain Reason for Exam: Chest Pain Acuity: Chronic Type of Exam: Ongoing FINDINGS: Heart size and pulmonary vessels within normal limits. Interval development of right hilar/suprahilar mass. Right lung clear with a sharp costophrenic angle. Blunting of the left costophrenic angle     1. New right hilar/suprahilar mass. Recommend CT chest 2. Left pleural effusion     Ct Chest Pulmonary Embolism W Contrast    Result Date: 10/22/2020  EXAMINATION: CTA OF THE CHEST 10/22/2020 3:33 am TECHNIQUE: CTA of the chest was performed after the administration of intravenous contrast.  Multiplanar reformatted images are provided for review. MIP images are provided for review. Dose modulation, iterative reconstruction, and/or weight based adjustment of the mA/kV was utilized to reduce the radiation dose to as low as reasonably achievable.  COMPARISON: CT abdomen pelvis 09/29/2020; CT chest 09/28/2020 HISTORY: ORDERING SYSTEM PROVIDED HISTORY: History of lung cancer short of breath TECHNOLOGIST PROVIDED HISTORY: Reason for exam:->History of lung cancer short of breath Reason for Exam: History of lung cancer short of breath Acuity: Acute Type of Exam: Initial FINDINGS: Pulmonary Arteries: Pulmonary arteries are adequately opacified for evaluation. Focal filling defect within the right lower lobe posterior basal subsegmental pulmonary artery. Main pulmonary artery is normal in caliber. In stranding compression of right hilar bronchovascular structures and obliteration of the right upper lobe pulmonary artery. Mediastinum: Increased size of infiltrative mediastinal/right hilar mass measuring 8.9 x 7.5 cm in axial plane and 8.4 cm craniocaudal previously this measured 8.2 x 6.7 x 8.0 cm. Severe narrowing of the downstream SVC with resultant mediastinal and left chest collaterals and filling of the azygos system. The azygos-SVC confluence is obliterated. Obliteration of the right upper lobe lobar pulmonary artery, superior right pulmonary vein and mass effect and right upper lobe bronchial structures. Right hilar and mediastinal lymphadenopathy. Normal caliber thoracic aorta. Multivessel coronary calcifications. No pericardial effusion. Lungs/pleura: Small right pleural effusion. Ground-glass abnormality surrounds the right hilar/mediastinal mass. Bronchial walls are thickened in this distribution and there is interlobular septal thickening. [place] nodule in the subpleural left lower lobe is most suspicious for benign parenchymal lymph node. Mild centrilobular emphysema. Patchy nodular opacities in the right upper lobe. Upper Abdomen: Limited images of the upper abdomen are unremarkable for any acute findings. Nodular appearance of the left adrenal gland is unchanged, obscured by motion on today's exam. Soft Tissues/Bones: Colonic interposition with gas-filled transverse colon. No suspicious osteolytic or osteoblastic lesion. Subsegmental acute pulmonary embolism in the right lower lobe posterior basal branch. Interval increase in size of infiltrative right hilar/mediastinal mass with extrinsic compression/invasion of bronchovascular structures, as described. Metastatic right hilar and mediastinal lymph nodes. Ground-glass halo has developed in the right upper lobe along the distribution of mass, either representing lymphatic/venous congestion, locoregional disease spread or postobstructive infection. More nodular opacities in the right upper lobe are suspicious for the latter as well. Critical results were called by Dr. Koko Luevano to [facility] on 10/22/2020 at 04:07. Ct Chest Pulmonary Embolism W Contrast    Result Date: 9/29/2020  EXAMINATION: CTA OF THE CHEST 9/28/2020 6:38 pm TECHNIQUE: CTA of the chest was performed after the administration of intravenous contrast.  Multiplanar reformatted images are provided for review. MIP images are provided for review. Dose modulation, iterative reconstruction, and/or weight based adjustment of the mA/kV was utilized to reduce the radiation dose to as low as reasonably achievable. COMPARISON: Same date chest radiograph HISTORY: ORDERING SYSTEM PROVIDED HISTORY: SOB suspect pulmonary embolus TECHNOLOGIST PROVIDED HISTORY: Reason for exam:->SOB suspect pulmonary embolus Reason for Exam: SOB suspect pulmonary embolus Acuity: Acute Type of Exam: Initial FINDINGS: Pulmonary Arteries: Pulmonary arteries are adequately opacified for evaluation. No evidence of intraluminal filling defect to suggest pulmonary embolism. Main pulmonary artery is normal in caliber. Mediastinum: CT confirms that there is right suprahilar conglomerate mian/pulmonic mass which results in obliteration of the traversing SVC segment and narrows the main right pulmonary artery. This also narrows the distal right mainstem bronchus.   Exact measurement is difficult due to its configuration and confluent nature but it measures approximately 8.3 x 6.7 cm (series 2, image 187). Diffuse subcutaneous fat stranding of the mediastinum is felt to be related to some degree SVC syndrome/obstruction from the aforementioned mass. The heart and pericardium demonstrate no acute abnormality. There is no acute abnormality of the thoracic aorta. Lungs/pleura: Conglomerate left right upper/suprahilar pulmonic mass which is confluent with the large right hilar and pretracheal lymphadenopathy. There is postobstructive atelectasis of the right lung apex with interlobular septal thickening. Trace bilateral pleural effusions noted. Upper Abdomen: Subcentimeter low densities in the liver noted. No calcified gallstones. The adrenal glands are unremarkable. Moderate-severe atherosclerotic aortic disease which affects the origins of the great vessels and visceral vasculature. Soft Tissues/Bones: No acute bone or soft tissue abnormality. No evidence of pulmonary embolism. Large 8.3 cm right suprahilar/upper lobe pulmonic mass which is confluent with enlarged right hilar and pretracheal lymph nodes. This is consistent with bronchogenic carcinoma. The mass significantly narrows the traversing mid SVC segment and the presence of fat infiltration of the mediastinum could indicate some degree of SVC syndrome/obstruction. Trace bilateral pleural effusions. Ir Port Placement > 5 Years    Addendum Date: 10/14/2020    ADDENDUM: Additional information is provided for documentation purposes. As noted in the operative report in epic, estimated blood loss less than 5 mL. Result Date: 10/14/2020  PROCEDURE: ULTRASOUND GUIDED VASCULAR ACCESS. FLUOROSCOPY GUIDED PLACEMENT OF A SUBCUTANEOUS PORT-A-CATH. MODERATE CONSCIOUS SEDATION 10/1/2020. HISTORY: ORDERING SYSTEM PROVIDED HISTORY: chemo TECHNOLOGIST PROVIDED HISTORY: Reason for exam:->chemo How many lumens are being requested?->1 What site is the preferred site? ->No preference What side should this line be placed? ->Either SEDATION: None due to patient's shortness of breath. 1 mg of Ativan was administered. Procedure times 30 minutes. FLUOROSCOPY DOSE AND TYPE OR TIME AND EXPOSURES: 0.6 minutes, 4 images, 17.1 mGy TECHNIQUE: Informed consent was obtained after a detailed explanation of the procedure including risks, benefits, and alternatives. Universal protocol was observed. The patient received preprocedure IV antibiotics. The right neck and chest were prepped and draped in sterile fashion using maximum sterile barrier technique. Local anesthesia was achieved with lidocaine. A micropuncture needle was used to access the right internal jugular vein using ultrasound guidance. An ultrasound image demonstrating patency of the vein with needle tip located within it. An image was obtained and stored in PACs. A 0.035 guidewire was used to place a peel-away sheath. A chest wall incision was made and a subcutaneous pocket was created. The port reservoir was placed within the pocket and the port tubing was attached and tunneled subcutaneously to the venotomy site. The port tubing was cut to an appropriate length and advanced through the peel-away sheath under fluoroscopic guidance to the cavo-atrial junction. The chest wall incision was closed using 4-0 Vicryl suture and tissue adhesive was applied to the venotomy site and chest wall incision. The port flushed easily and there was a good blood return. The port was locked with heparinized saline. The patient tolerated the procedure well and there were no immediate complications. FINDINGS: Fluoroscopic image demonstrates the tip of the port in the upper right atrium. Successful ultrasound and fluoroscopy guided right internal jugular power port placement.      Nm Bone Scan Whole Body    Result Date: 9/29/2020  EXAMINATION: WHOLE BODY BONE SCAN  9/29/2020 TECHNIQUE: The patient was injected intravenously with 29.3 mCi of 99 mTc MDP and scintigraphy of the entire skeleton was performed approximately three hours later. COMPARISON: Patient did not have previous imaging studies for comparison. HISTORY: ORDERING SYSTEM PROVIDED HISTORY: lung ca staging TECHNOLOGIST PROVIDED HISTORY: Reason for exam:->lung ca staging Reason for Exam: new dx lung Ca Acuity: Unknown Type of Exam: Initial Relevant Medical/Surgical History: Bilateral leg amputations FINDINGS: Patient is status post amputation of the lower extremities at the level of the mid thighs. Diffuse activity seen throughout the spine, without singular level of intense activity, likely degenerative. Mild activity at bilateral shoulders, sternoclavicular joints, hips, likely degenerative. Paranasal activity, typically on the basis of sinus disease or healing trauma. Physiologic activity is seen within the kidneys. Bladder has been masked, obscuring the central pelvis. Multifocal degenerative change, without a generalized scintigraphic pattern of osseous metastatic disease. Mri Brain W Wo Contrast    Result Date: 10/22/2020  EXAMINATION: MRI OF THE BRAIN WITHOUT AND WITH CONTRAST  10/22/2020 9:39 am TECHNIQUE: Multiplanar multisequence MRI of the head/brain was performed without and with the administration of intravenous contrast. COMPARISON: None. HISTORY: ORDERING SYSTEM PROVIDED HISTORY: small cell eval brain mets TECHNOLOGIST PROVIDED HISTORY: Reason for exam:->small cell eval brain mets Reason for Exam: Small Cell cancer, Eval for mets Acuity: Unknown Type of Exam: Initial Relevant Medical/Surgical History: Lung cancer FINDINGS: Motion degrades images limiting evaluation. INTRACRANIAL STRUCTURES/VENTRICLES:  There is no acute infarct. No mass effect or midline shift. No evidence of an acute intracranial hemorrhage. Areas of T2 FLAIR hyperintensity are seen in the periventricular and subcortical white matter, which are nonspecific, but may represent chronic microvascular ischemic change. There is prominence of the ventricles and sulci due to global parenchymal volume loss. The sellar/suprasellar regions appear unremarkable. The normal signal voids within the major intracranial vessels appear maintained. No abnormal focus of enhancement is seen within the brain. ORBITS: The visualized portion of the orbits demonstrate no acute abnormality. SINUSES: Minimal scattered mucosal thickening of the paranasal sinuses. Bilateral mastoid effusions, left greater than right. BONES/SOFT TISSUES: The bone marrow signal intensity is not well evaluated due to motion the soft tissues demonstrate no acute abnormality. 1. Patient motion degrades images limiting evaluation. 2. No convincing acute intracranial abnormality. No acute infarct. 3. No gross evidence to suggest intracranial metastatic disease. 4. Mild global parenchymal volume loss with mild chronic microvascular ischemic changes. 5. Bilateral mastoid effusions, left greater than right.          Problem List  Patient Active Problem List   Diagnosis    Type 2 diabetes mellitus with circulatory disorder ([address]    Mixed hyperlipidemia    Essential hypertension    Phantom limb syndrome with pain ([address]    Tobacco abuse    History of substance abuse ([address]    Chronic pain syndrome    Lumbosacral spondylosis without myelopathy    Superior vena cava compression syndrome    Mediastinal mass    Pulmonary embolus (HCC)    Lung cancer ([address]    Atypical pneumonia    Pneumonia       IMPRESSION/RECOMMENDATIONS:  Small cell lung cancer-dx beginning of oct/end sept  He has had one cycle of chemo inpt   He is due next week for round 2   SInce his treatment he has noticed less pain and is more able to lay down now so I question if he is really \"worsening\" -  HIs enlarged nodes may be inflammatory   THis am--I discussed with rad onc t  We pulled up the images-side by side=the mass looks stable -thus no urgent need for radiation-we were planning to incorporate probably after cycle 2 because pt still needed a brain mri to complete staging (was unable to lay flat last hosp stay due to pain)   NOac should be reasonable upon dc-pending mri brain     HYponatremia I suspect is siadh -thus likely start with fluid restriction=-  Mental status is at baseline     Would tx pe and potential ? Pneumonia and moniter sodium as I suspect its related to this event-  I will check his brain mri now-as we were going to do oupt anyways to make sure the low sodium is not cns related     Will plan next chemo as charlene balderas depending on how long he is \here because he is due next week

## 2020-10-22 NOTE — PROGRESS NOTES
Clinical Pharmacy Note  Heparin Dosing       Lab Results   Component Value Date    APTT 38.4 10/22/2020     Lab Results   Component Value Date    HGB 10.6 10/22/2020    HCT 30.7 10/22/2020     10/22/2020    INR 1.11 09/30/2020       Current Infusion Rate: 15 mL/hr    Plan:  Bolus: 3300 units  Rate: 16.7 mL/hr  Next aPTT: 1900 10/22/20    Pharmacy will continue to monitor and adjust based on aPTT results.   Erika Swartz [facility],10/22/2020,12:41 PM

## 2020-10-22 NOTE — PROGRESS NOTES
Clinical Pharmacy Note  Heparin Dosing       Lab Results   Component Value Date    APTT 51.0 10/22/2020     Lab Results   Component Value Date    HGB 10.6 10/22/2020    HCT 30.7 10/22/2020     10/22/2020    INR 1.11 09/30/2020       Current Infusion Rate: 16.7 mL/hr    Plan:    Rate: Continue at 16.7 mL/hr  Next aPTT: 0100 10/23/20    Pharmacy will continue to monitor and adjust based on aPTT results.   Avinash Anderson, [address]  10/22/2020  7:44 PM

## 2020-10-22 NOTE — CONSULTS
Clinical Pharmacy Note  Heparin Dosing       Lab Results   Component Value Date    APTT 29.8 10/22/2020     Lab Results   Component Value Date    HGB 10.6 10/22/2020    HCT 30.7 10/22/2020     10/22/2020    INR 1.11 09/30/2020       Plan:  Initial bolus: 6700 units  Initial infusion rate: 15 mL/hr  Next aPTT: 1200 10/22/20    Pharmacy will continue to monitor and adjust based on aPTT results.   Yesenia Hanna, PharmD

## 2020-10-22 NOTE — ED PROVIDER NOTES
Attending Supervisory Note/Shared Visit   I have personally performed a face to face diagnostic evaluation on this patient. I have reviewed the mid-levels findings and agree. History and Exam by me shows well-appearing male no acute distress. Patient presented to the ED for evaluation of cough and orthopnea. States that the symptoms have been progressive in onset and progressively worsening. Denies fevers, productive cough, or exposure to sick persons. He was recently diagnosed with lung cancer. Denies pain or swelling to lower extremities. On initial presentation patient noted to be tachypneic but was satting well. He does smoke cigarettes. At time evaluation patient was resting comfortably and states that his respiratory effort has improved. He does not feel dyspneic at time of my evaluation. Lungs are clear without rhonchi wheezes or rales. He has a regular rate and rhythm and is not tachycardic. He has palpable pulses in all extremities and appears well-perfused. Work-up remarkable for hyponatremia which appears to be chronic for the patient may be secondary to his pulmonary malignancy. He does have a slight leukocytosis. Chest x-ray shows stable findings without acute cardiopulmonary disease. Patient improved with symptomatic treatment. Initially he was reported to have decreased breath sounds and wheezing which have resolved after DuoNeb treatments. Low suspicion for ACS or pulmonary embolism as he is not currently tachycardic and had physical exam findings that would explain his symptoms. We will treat the patient symptomatically with bronchodilators he is amenable to discharge home with outpatient follow-up. I agree with the LEYLA plan of care. Please LEYLA Note for full ED course and final disposition. EKG shows a sinus rhythm with an ventricular rate of 83 bpm.  Patient's HI interval and QTc interval within normal limits. There is leftward axis deviation.   There are significant ST elevations depressions EKG is nondiagnostic for ACS. Compared EKG from 9/28/2020 did not appreciate significant changes. Disposition:  1. Dyspnea, unspecified type    2.  Cough          Jarvis Huynh MD  Attending Emergency Physician        Jarvis Huynh MD  10/21/20 2014

## 2020-10-22 NOTE — PROGRESS NOTES
Pharmacy Medication Reconciliation Note     List of medications patient is currently taking is complete. Source of information:   1.  List reviewed from [facility]    Allergies   Allergen Reactions    Niacin And Related Anaphylaxis     Muscle ached a lot    Statins [Statins] Itching       CORNELIA Lyons [facility]  10/22/2020  12:35 PM

## 2020-10-23 VITALS
HEIGHT: 72 IN | SYSTOLIC BLOOD PRESSURE: 129 MMHG | BODY MASS INDEX: 24.55 KG/M2 | HEART RATE: 82 BPM | TEMPERATURE: 97.5 F | DIASTOLIC BLOOD PRESSURE: 72 MMHG | RESPIRATION RATE: 18 BRPM | WEIGHT: 181.22 LBS | OXYGEN SATURATION: 96 %

## 2020-10-23 LAB
ANION GAP SERPL CALCULATED.3IONS-SCNC: 12 MMOL/L (ref 3–16)
ANION GAP SERPL CALCULATED.3IONS-SCNC: 9 MMOL/L (ref 3–16)
ANISOCYTOSIS: ABNORMAL
APTT: 39.7 SEC (ref 24.2–36.2)
APTT: 63.3 SEC (ref 24.2–36.2)
BASOPHILS ABSOLUTE: 0.1 K/UL (ref 0–0.2)
BASOPHILS RELATIVE PERCENT: 1 %
BUN BLDV-MCNC: 4 MG/DL (ref 7–20)
BUN BLDV-MCNC: 5 MG/DL (ref 7–20)
CALCIUM SERPL-MCNC: 8.4 MG/DL (ref 8.3–10.6)
CALCIUM SERPL-MCNC: 9 MG/DL (ref 8.3–10.6)
CHLORIDE BLD-SCNC: 92 MMOL/L (ref 99–110)
CHLORIDE BLD-SCNC: 93 MMOL/L (ref 99–110)
CO2: 24 MMOL/L (ref 21–32)
CO2: 28 MMOL/L (ref 21–32)
CREAT SERPL-MCNC: 0.6 MG/DL (ref 0.8–1.3)
CREAT SERPL-MCNC: <0.5 MG/DL (ref 0.8–1.3)
EOSINOPHILS ABSOLUTE: 0 K/UL (ref 0–0.6)
EOSINOPHILS RELATIVE PERCENT: 0 %
GFR AFRICAN AMERICAN: >60
GFR AFRICAN AMERICAN: >60
GFR NON-AFRICAN AMERICAN: >60
GFR NON-AFRICAN AMERICAN: >60
GLUCOSE BLD-MCNC: 116 MG/DL (ref 70–99)
GLUCOSE BLD-MCNC: 192 MG/DL (ref 70–99)
HCT VFR BLD CALC: 33 % (ref 40.5–52.5)
HEMOGLOBIN: 11 G/DL (ref 13.5–17.5)
LYMPHOCYTES ABSOLUTE: 1.7 K/UL (ref 1–5.1)
LYMPHOCYTES RELATIVE PERCENT: 12 %
MCH RBC QN AUTO: 29.3 PG (ref 26–34)
MCHC RBC AUTO-ENTMCNC: 33.4 G/DL (ref 31–36)
MCV RBC AUTO: 87.6 FL (ref 80–100)
MONOCYTES ABSOLUTE: 1.1 K/UL (ref 0–1.3)
MONOCYTES RELATIVE PERCENT: 8 %
NEUTROPHILS ABSOLUTE: 11.1 K/UL (ref 1.7–7.7)
NEUTROPHILS RELATIVE PERCENT: 79 %
PDW BLD-RTO: 15.3 % (ref 12.4–15.4)
PLATELET # BLD: 505 K/UL (ref 135–450)
PLATELET SLIDE REVIEW: ABNORMAL
PMV BLD AUTO: 6.8 FL (ref 5–10.5)
POTASSIUM SERPL-SCNC: 3.6 MMOL/L (ref 3.5–5.1)
POTASSIUM SERPL-SCNC: 3.8 MMOL/L (ref 3.5–5.1)
RBC # BLD: 3.77 M/UL (ref 4.2–5.9)
SLIDE REVIEW: ABNORMAL
SODIUM BLD-SCNC: 129 MMOL/L (ref 136–145)
SODIUM BLD-SCNC: 129 MMOL/L (ref 136–145)
VACUOLATED NEUTROPHILS: PRESENT
WBC # BLD: 14 K/UL (ref 4–11)

## 2020-10-23 PROCEDURE — 6370000000 HC RX 637 (ALT 250 FOR IP): Performed by: NURSE PRACTITIONER

## 2020-10-23 PROCEDURE — 2580000003 HC RX 258: Performed by: HOSPITALIST

## 2020-10-23 PROCEDURE — 6360000002 HC RX W HCPCS: Performed by: STUDENT IN AN ORGANIZED HEALTH CARE EDUCATION/TRAINING PROGRAM

## 2020-10-23 PROCEDURE — 6370000000 HC RX 637 (ALT 250 FOR IP): Performed by: HOSPITALIST

## 2020-10-23 PROCEDURE — 80048 BASIC METABOLIC PNL TOTAL CA: CPT

## 2020-10-23 PROCEDURE — 85730 THROMBOPLASTIN TIME PARTIAL: CPT

## 2020-10-23 PROCEDURE — 36415 COLL VENOUS BLD VENIPUNCTURE: CPT

## 2020-10-23 PROCEDURE — 2700000000 HC OXYGEN THERAPY PER DAY

## 2020-10-23 PROCEDURE — 2580000003 HC RX 258: Performed by: STUDENT IN AN ORGANIZED HEALTH CARE EDUCATION/TRAINING PROGRAM

## 2020-10-23 PROCEDURE — 94760 N-INVAS EAR/PLS OXIMETRY 1: CPT

## 2020-10-23 PROCEDURE — 94640 AIRWAY INHALATION TREATMENT: CPT

## 2020-10-23 PROCEDURE — 85025 COMPLETE CBC W/AUTO DIFF WBC: CPT

## 2020-10-23 PROCEDURE — 6370000000 HC RX 637 (ALT 250 FOR IP): Performed by: STUDENT IN AN ORGANIZED HEALTH CARE EDUCATION/TRAINING PROGRAM

## 2020-10-23 PROCEDURE — 6370000000 HC RX 637 (ALT 250 FOR IP): Performed by: INTERNAL MEDICINE

## 2020-10-23 RX ORDER — HEPARIN SODIUM 1000 [USP'U]/ML
3300 INJECTION, SOLUTION INTRAVENOUS; SUBCUTANEOUS ONCE
Status: DISCONTINUED | OUTPATIENT
Start: 2020-10-23 | End: 2020-10-23 | Stop reason: HOSPADM

## 2020-10-23 RX ORDER — HYDRALAZINE HYDROCHLORIDE 25 MG/1
25 TABLET, FILM COATED ORAL EVERY 8 HOURS SCHEDULED
Status: DISCONTINUED | OUTPATIENT
Start: 2020-10-23 | End: 2020-10-23

## 2020-10-23 RX ORDER — CLONIDINE HYDROCHLORIDE 0.1 MG/1
0.3 TABLET ORAL 3 TIMES DAILY
Status: DISCONTINUED | OUTPATIENT
Start: 2020-10-23 | End: 2020-10-23

## 2020-10-23 RX ORDER — HYDRALAZINE HYDROCHLORIDE 25 MG/1
25 TABLET, FILM COATED ORAL EVERY 8 HOURS SCHEDULED
Status: DISCONTINUED | OUTPATIENT
Start: 2020-10-23 | End: 2020-10-23 | Stop reason: HOSPADM

## 2020-10-23 RX ORDER — AMOXICILLIN AND CLAVULANATE POTASSIUM 875; 125 MG/1; MG/1
1 TABLET, FILM COATED ORAL 2 TIMES DAILY
Qty: 10 TABLET | Refills: 0 | Status: SHIPPED | OUTPATIENT
Start: 2020-10-23 | End: 2020-10-28

## 2020-10-23 RX ORDER — CLONIDINE HYDROCHLORIDE 0.1 MG/1
0.3 TABLET ORAL 3 TIMES DAILY
Status: DISCONTINUED | OUTPATIENT
Start: 2020-10-23 | End: 2020-10-23 | Stop reason: HOSPADM

## 2020-10-23 RX ADMIN — LABETALOL HYDROCHLORIDE 300 MG: 200 TABLET, FILM COATED ORAL at 01:21

## 2020-10-23 RX ADMIN — HYDRALAZINE HYDROCHLORIDE 25 MG: 25 TABLET, FILM COATED ORAL at 01:21

## 2020-10-23 RX ADMIN — CLONIDINE HYDROCHLORIDE 0.3 MG: 0.1 TABLET ORAL at 14:50

## 2020-10-23 RX ADMIN — CLONIDINE HYDROCHLORIDE 0.3 MG: 0.1 TABLET ORAL at 08:21

## 2020-10-23 RX ADMIN — HYDRALAZINE HYDROCHLORIDE 25 MG: 25 TABLET, FILM COATED ORAL at 14:50

## 2020-10-23 RX ADMIN — Medication 15 G: at 14:49

## 2020-10-23 RX ADMIN — CLONIDINE HYDROCHLORIDE 0.3 MG: 0.1 TABLET ORAL at 01:21

## 2020-10-23 RX ADMIN — PIPERACILLIN AND TAZOBACTAM 3.38 G: 3; .375 INJECTION, POWDER, LYOPHILIZED, FOR SOLUTION INTRAVENOUS at 04:08

## 2020-10-23 RX ADMIN — ACETAMINOPHEN 650 MG: 325 TABLET ORAL at 03:03

## 2020-10-23 RX ADMIN — SODIUM CHLORIDE: 9 INJECTION, SOLUTION INTRAVENOUS at 04:08

## 2020-10-23 RX ADMIN — TIOTROPIUM BROMIDE INHALATION SPRAY 2 PUFF: 3.12 SPRAY, METERED RESPIRATORY (INHALATION) at 09:42

## 2020-10-23 ASSESSMENT — PAIN SCALES - GENERAL
PAINLEVEL_OUTOF10: 0

## 2020-10-23 ASSESSMENT — PAIN DESCRIPTION - ONSET
ONSET: ON-GOING
ONSET: ON-GOING

## 2020-10-23 ASSESSMENT — PAIN DESCRIPTION - ORIENTATION
ORIENTATION: LOWER;MID
ORIENTATION: LOWER;MID

## 2020-10-23 ASSESSMENT — PAIN DESCRIPTION - LOCATION
LOCATION: BACK
LOCATION: BACK

## 2020-10-23 ASSESSMENT — PAIN - FUNCTIONAL ASSESSMENT
PAIN_FUNCTIONAL_ASSESSMENT: PREVENTS OR INTERFERES SOME ACTIVE ACTIVITIES AND ADLS
PAIN_FUNCTIONAL_ASSESSMENT: PREVENTS OR INTERFERES SOME ACTIVE ACTIVITIES AND ADLS

## 2020-10-23 ASSESSMENT — PAIN DESCRIPTION - DESCRIPTORS
DESCRIPTORS: ACHING
DESCRIPTORS: ACHING

## 2020-10-23 ASSESSMENT — PAIN DESCRIPTION - PROGRESSION
CLINICAL_PROGRESSION: GRADUALLY WORSENING
CLINICAL_PROGRESSION: GRADUALLY WORSENING

## 2020-10-23 ASSESSMENT — PAIN DESCRIPTION - FREQUENCY
FREQUENCY: CONTINUOUS
FREQUENCY: CONTINUOUS

## 2020-10-23 ASSESSMENT — PAIN DESCRIPTION - PAIN TYPE
TYPE: ACUTE PAIN
TYPE: ACUTE PAIN

## 2020-10-23 NOTE — PROGRESS NOTES
Pt discharging back to [place]. [employer]. Attempted to call report but no answer. Awaiting transport.

## 2020-10-23 NOTE — PLAN OF CARE
Problem: Falls - Risk of:  Goal: Will remain free from falls  Description: Will remain free from falls  10/23/2020 0843 by Rudy Juares RN  Outcome: Ongoing  10/23/2020 0015 by Brissa Florez RN  Outcome: Ongoing  Goal: Absence of physical injury  Description: Absence of physical injury  10/23/2020 0843 by Rudy Juares RN  Outcome: Ongoing  10/23/2020 0015 by Brissa Florez RN  Outcome: Ongoing     Problem: Skin Integrity:  Goal: Will show no infection signs and symptoms  Description: Will show no infection signs and symptoms  10/23/2020 0843 by Rudy Juares RN  Outcome: Ongoing  10/23/2020 0015 by Brissa Florez RN  Outcome: Ongoing  Goal: Absence of new skin breakdown  Description: Absence of new skin breakdown  10/23/2020 0843 by Rudy Juares RN  Outcome: Ongoing  10/23/2020 0015 by Brissa Florez RN  Outcome: Ongoing

## 2020-10-23 NOTE — CONSULTS
Office: [phone]       Fax: [phone]      Nephrology Initial Consult Note        Patient's Name: Alessandro Bhat Date: 10/22/2020  Date of Visit: 10/23/2020    Reason for Consult:  Hyponatremia  Requesting Physician: Micheline Rao MD  PCP: Caitlin Card MD    Chief Complaint:  shortness of breath      History of Present Illness:      Kayy Vidal is a 62 y.o. male with PMHx of hypertension, DM, SCLC on chemo (carboplatin/VP16), peripheral vascular disease status post bilateral above-knee amputation who was hospitalized on 10/22/2020 with complaints of shortness of breath   Noted to be hypoxic. Chest CT showed sub segmental pulmonary embolism. Na noted to be low at 121. No diarrhea, nausea or vomiting   Overnight kept on NS.  Na improved to 129  Feels better today  Home meds reviewed and noted ton Cymbalta    Past Medical History:   Diagnosis Date    Anxiety     Heart disease     Hyperlipidemia     triglycerides    Hypertension     Peripheral vascular disease, unspecified ([facility] [address]     bilateral AKA amputation    Phantom limb (syndrome)     Type II or unspecified type diabetes mellitus without mention of complication, not stated as uncontrolled        Past Surgical History:   Procedure Laterality Date    ABOVE KNEE AMPUTATION  2009,2010    both    BRONCHOSCOPY N/A 9/29/2020    BRONCHOSCOPY performed by Prateek Randolph MD at [address] [place] [address]  9/29/2020    BRONCHOSCOPY W/EBUS FNA performed by Prateek Randolph MD at [place]  [facility]    IR PORT PLACEMENT EQUAL OR GREATER THAN 5 YEARS  10/1/2020    IR PORT PLACEMENT EQUAL OR GREATER THAN 5 YEARS 10/1/2020 [facility]    TUNNELED VENOUS PORT PLACEMENT Right 10/01/2020    Power port inserted by Dr. Agustin Monreal       Family History   Problem 181 lb 3.5 oz (82.2 kg)   SpO2 99%   BMI 24.58 kg/m²    Wt Readings from Last 3 Encounters:   10/23/20 181 lb 3.5 oz (82.2 kg)   10/14/20 177 lb 4 oz (80.4 kg)   10/03/20 183 lb 13.8 oz (83.4 kg)      24HR INTAKE/OUTPUT:      Intake/Output Summary (Last 24 hours) at 10/23/2020 0839  Last data filed at 10/23/2020 0336  Gross per 24 hour   Intake 240 ml   Output 2625 ml   Net -2385 ml     Constitutional:  awake, NAD  HEENT:  MMM, No icterus  Neck: no bruits, No JVD  Cardiovascular:  S1, S2 reg  Respiratory: CTA, no crackles  Abdomen:  +BS, soft, NT, ND  Ext: No lower extremity stump edema. status post bilateral above-knee amputation   Psychiatric: mood and affect appropriate  Skin: dry/intact  CNS: alert, no agitation    IMAGING:  MRI BRAIN W WO CONTRAST   Final Result   1. Patient motion degrades images limiting evaluation. 2. No convincing acute intracranial abnormality. No acute infarct. 3. No gross evidence to suggest intracranial metastatic disease. 4. Mild global parenchymal volume loss with mild chronic microvascular   ischemic changes. 5. Bilateral mastoid effusions, left greater than right. CT CHEST PULMONARY EMBOLISM W CONTRAST   Final Result   Subsegmental acute pulmonary embolism in the right lower lobe posterior basal   branch. Interval increase in size of infiltrative right hilar/mediastinal mass with   extrinsic compression/invasion of bronchovascular structures, as described. Metastatic right hilar and mediastinal lymph nodes. Ground-glass halo has developed in the right upper lobe along the   distribution of mass, either representing lymphatic/venous congestion,   locoregional disease spread or postobstructive infection. More nodular   opacities in the right upper lobe are suspicious for the latter as well. Critical results were called by Dr. Orlando Gloria to [facility] on 10/22/2020 at 04:07.          XR CHEST PORTABLE   Final Result   Small bilateral pleural effusions with unchanged right hilar mass. 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 Assessment :     1. Hyponatremia  -Posm 252  -Ur Osm 124, Ur Na <20  -Baseline creat: <1  -no recent TSH  -Volume: Hypovolemic on presentation  -Acid-Base: Metabolic alkalosis  -Other electrolytes: No Dyskalemia   -Hypovolemic hyponatremia based on above. Responded to NS. Has underlying poor solute intake. Recent Labs     10/22/20  0211 10/23/20  0025   *  121* 129*   K 4.6  4.1 3.6   CO2 21  24 24   BUN 4*  3* 5*   MG 1.90  --        2. HTN  -Blood pressure at goal     BP Readings from Last 1 Encounters:   10/23/20 129/72       3. SCLC  -on chemo (Carboplatin/VP16)    4. PVD    Plan:     - dc NS  - Give Urea powder  - Fluid intake guided by thirst  - If Na trends down, will repeat ur studies  - monitor Na    -Monitor I/O, UOP  -Maintain MAP>65  -Avoid nephrotoxin, if able. -Dose meds to current eGFR    Thank you for allowing us to participate in care of [facility] . We will continue to follow. Feel free to contact me with any questions.       Socorro Estrada  10/23/2020    [facility] [address]  Office : [phone]  Fax :[phone]

## 2020-10-23 NOTE — PROGRESS NOTES
HISTORY: ORDERING SYSTEM PROVIDED HISTORY: lung mass staging TECHNOLOGIST PROVIDED HISTORY: Reason for exam:->lung mass staging Additional Contrast?->Oral Reason for Exam: lung mass staging FINDINGS: Lower Chest: Known right hilar mass, incompletely imaged. Basilar right lower lobe atelectasis. Trace pleural effusions. Organs: Bilateral renal cysts up to 1 cm on the left. Tiny hepatic cysts up to 4 mm. No solid hepatic mass. Normal adrenal morphology. Spleen and pancreas unremarkable. Hepatic excreted contrast in the gallbladder GI/Bowel: No gastrointestinal abnormality. No mesenteric adenopathy Pelvis: Urinary bladder unremarkable. No pelvic adenopathy. Fat containing bilateral inguinal hernias Peritoneum/Retroperitoneum: No retroperitoneal adenopathy. No ascites or peritoneal tumor. Atherosclerosis of normal caliber abdominal aorta Bones/Soft Tissues: No suspicious bone lesion     No evidence of abdominopelvic metastatic disease     Xr Chest Portable    Result Date: 10/22/2020  EXAMINATION: ONE XRAY VIEW OF THE CHEST 10/22/2020 1:50 am COMPARISON: Chest radiographs 10/14/2020, 10/03/2020; CT chest 09/28/2020 HISTORY: ORDERING SYSTEM PROVIDED HISTORY: SOB TECHNOLOGIST PROVIDED HISTORY: Reason for exam:->SOB Reason for Exam: sob FINDINGS: Unchanged right perihilar convexity/masslike opacity. No new focal airspace disease. No pneumothorax. Blunted left costophrenic sulcus. Indistinct right costophrenic sulcus. Elevated right hemidiaphragm with colonic interposition. Stable heart size. Right chest port catheter tip terminates over the SVC. Small bilateral pleural effusions with unchanged right hilar mass. Colonic interposition may be seen with Chilaiditi syndrome in the appropriate context.      Xr Chest Portable    Result Date: 10/14/2020  EXAMINATION: ONE XRAY VIEW OF THE CHEST 10/14/2020 1:48 am COMPARISON: 10/03/2020 HISTORY: ORDERING SYSTEM PROVIDED HISTORY: SOB TECHNOLOGIST PROVIDED HISTORY: Reason for exam:->SOB Reason for Exam: sob Acuity: Acute Relevant Medical/Surgical History: hx diabetes, hypertension FINDINGS: Right chest port is again noted. Right hilar/perihilar mass appears slightly improved though this may be due to differences in patient positioning. There is no acute airspace disease. The heart size is normal.  There is a small left pleural effusion. There is no discernible pneumothorax. 1. No acute airspace disease. 2. Right hilar/perihilar mass again seen. Xr Chest Portable    Result Date: 10/3/2020  EXAMINATION: ONE XRAY VIEW OF THE CHEST 10/3/2020 9:40 am COMPARISON: Chest x-ray dated 09/28/2020 HISTORY: ORDERING SYSTEM PROVIDED HISTORY: verify port ok to use for chemo TECHNOLOGIST PROVIDED HISTORY: Reason for exam:->verify port ok to use for chemo Reason for Exam: verify port ok to use for chemo Acuity: Acute Type of Exam: Ongoing FINDINGS: LINES/TUBES/OTHER: Right IJ port is noted with its tip in the distal SVC. HEART/MEDIASTINUM: The cardiomediastinal silhouette is within normal limits. PLEURA/LUNGS: There are no focal consolidations or pleural effusions. There is no appreciable pneumothorax. BONES/SOFT TISSUE: No acute abnormality. Right IJ port tip in appropriate position. Xr Chest Portable    Result Date: 9/28/2020  EXAMINATION: ONE XRAY VIEW OF THE CHEST 9/28/2020 5:35 pm COMPARISON: 02/29/2020 HISTORY: ORDERING SYSTEM PROVIDED HISTORY: Chest pain TECHNOLOGIST PROVIDED HISTORY: Reason for exam:->Chest pain Reason for Exam: Chest Pain Acuity: Chronic Type of Exam: Ongoing FINDINGS: Heart size and pulmonary vessels within normal limits. Interval development of right hilar/suprahilar mass. Right lung clear with a sharp costophrenic angle. Blunting of the left costophrenic angle     1. New right hilar/suprahilar mass. Recommend CT chest 2.  Left pleural effusion     Ct Chest Pulmonary Embolism W Contrast    Result Date: 10/22/2020  EXAMINATION: CTA OF left lower lobe is most suspicious for benign parenchymal lymph node. Mild centrilobular emphysema. Patchy nodular opacities in the right upper lobe. Upper Abdomen: Limited images of the upper abdomen are unremarkable for any acute findings. Nodular appearance of the left adrenal gland is unchanged, obscured by motion on today's exam. Soft Tissues/Bones: Colonic interposition with gas-filled transverse colon. No suspicious osteolytic or osteoblastic lesion. Subsegmental acute pulmonary embolism in the right lower lobe posterior basal branch. Interval increase in size of infiltrative right hilar/mediastinal mass with extrinsic compression/invasion of bronchovascular structures, as described. Metastatic right hilar and mediastinal lymph nodes. Ground-glass halo has developed in the right upper lobe along the distribution of mass, either representing lymphatic/venous congestion, locoregional disease spread or postobstructive infection. More nodular opacities in the right upper lobe are suspicious for the latter as well. Critical results were called by Dr. Dubois [place] to [facility] on 10/22/2020 at 04:07. Ct Chest Pulmonary Embolism W Contrast    Result Date: 9/29/2020  EXAMINATION: CTA OF THE CHEST 9/28/2020 6:38 pm TECHNIQUE: CTA of the chest was performed after the administration of intravenous contrast.  Multiplanar reformatted images are provided for review. MIP images are provided for review. Dose modulation, iterative reconstruction, and/or weight based adjustment of the mA/kV was utilized to reduce the radiation dose to as low as reasonably achievable.  COMPARISON: Same date chest radiograph HISTORY: ORDERING SYSTEM PROVIDED HISTORY: SOB suspect pulmonary embolus TECHNOLOGIST PROVIDED HISTORY: Reason for exam:->SOB suspect pulmonary embolus Reason for Exam: SOB suspect pulmonary embolus Acuity: Acute Type of Exam: Initial FINDINGS: Pulmonary Arteries: Pulmonary arteries are adequately opacified for evaluation. No evidence of intraluminal filling defect to suggest pulmonary embolism. Main pulmonary artery is normal in caliber. Mediastinum: CT confirms that there is right suprahilar conglomerate mian/pulmonic mass which results in obliteration of the traversing SVC segment and narrows the main right pulmonary artery. This also narrows the distal right mainstem bronchus. Exact measurement is difficult due to its configuration and confluent nature but it measures approximately 8.3 x 6.7 cm (series 2, image 187). Diffuse subcutaneous fat stranding of the mediastinum is felt to be related to some degree SVC syndrome/obstruction from the aforementioned mass. The heart and pericardium demonstrate no acute abnormality. There is no acute abnormality of the thoracic aorta. Lungs/pleura: Conglomerate left right upper/suprahilar pulmonic mass which is confluent with the large right hilar and pretracheal lymphadenopathy. There is postobstructive atelectasis of the right lung apex with interlobular septal thickening. Trace bilateral pleural effusions noted. Upper Abdomen: Subcentimeter low densities in the liver noted. No calcified gallstones. The adrenal glands are unremarkable. Moderate-severe atherosclerotic aortic disease which affects the origins of the great vessels and visceral vasculature. Soft Tissues/Bones: No acute bone or soft tissue abnormality. No evidence of pulmonary embolism. Large 8.3 cm right suprahilar/upper lobe pulmonic mass which is confluent with enlarged right hilar and pretracheal lymph nodes. This is consistent with bronchogenic carcinoma. The mass significantly narrows the traversing mid SVC segment and the presence of fat infiltration of the mediastinum could indicate some degree of SVC syndrome/obstruction. Trace bilateral pleural effusions.      Ir Port Placement > 5 Years    Addendum Date: 10/14/2020    ADDENDUM: Additional information is provided for documentation purposes. As noted in the operative report in epic, estimated blood loss less than 5 mL. Result Date: 10/14/2020  PROCEDURE: ULTRASOUND GUIDED VASCULAR ACCESS. FLUOROSCOPY GUIDED PLACEMENT OF A SUBCUTANEOUS PORT-A-CATH. MODERATE CONSCIOUS SEDATION 10/1/2020. HISTORY: ORDERING SYSTEM PROVIDED HISTORY: chemo TECHNOLOGIST PROVIDED HISTORY: Reason for exam:->chemo How many lumens are being requested?->1 What site is the preferred site? ->No preference What side should this line be placed? ->Either SEDATION: None due to patient's shortness of breath. 1 mg of Ativan was administered. Procedure times 30 minutes. FLUOROSCOPY DOSE AND TYPE OR TIME AND EXPOSURES: 0.6 minutes, 4 images, 17.1 mGy TECHNIQUE: Informed consent was obtained after a detailed explanation of the procedure including risks, benefits, and alternatives. Universal protocol was observed. The patient received preprocedure IV antibiotics. The right neck and chest were prepped and draped in sterile fashion using maximum sterile barrier technique. Local anesthesia was achieved with lidocaine. A micropuncture needle was used to access the right internal jugular vein using ultrasound guidance. An ultrasound image demonstrating patency of the vein with needle tip located within it. An image was obtained and stored in PACs. A 0.035 guidewire was used to place a peel-away sheath. A chest wall incision was made and a subcutaneous pocket was created. The port reservoir was placed within the pocket and the port tubing was attached and tunneled subcutaneously to the venotomy site. The port tubing was cut to an appropriate length and advanced through the peel-away sheath under fluoroscopic guidance to the cavo-atrial junction. The chest wall incision was closed using 4-0 Vicryl suture and tissue adhesive was applied to the venotomy site and chest wall incision. The port flushed easily and there was a good blood return.   The port was locked with heparinized saline. The patient tolerated the procedure well and there were no immediate complications. FINDINGS: Fluoroscopic image demonstrates the tip of the port in the upper right atrium. Successful ultrasound and fluoroscopy guided right internal jugular power port placement. Nm Bone Scan Whole Body    Result Date: 9/29/2020  EXAMINATION: WHOLE BODY BONE SCAN  9/29/2020 TECHNIQUE: The patient was injected intravenously with 29.3 mCi of 99 mTc MDP and scintigraphy of the entire skeleton was performed approximately three hours later. COMPARISON: Patient did not have previous imaging studies for comparison. HISTORY: ORDERING SYSTEM PROVIDED HISTORY: lung ca staging TECHNOLOGIST PROVIDED HISTORY: Reason for exam:->lung ca staging Reason for Exam: new dx lung Ca Acuity: Unknown Type of Exam: Initial Relevant Medical/Surgical History: Bilateral leg amputations FINDINGS: Patient is status post amputation of the lower extremities at the level of the mid thighs. Diffuse activity seen throughout the spine, without singular level of intense activity, likely degenerative. Mild activity at bilateral shoulders, sternoclavicular joints, hips, likely degenerative. Paranasal activity, typically on the basis of sinus disease or healing trauma. Physiologic activity is seen within the kidneys. Bladder has been masked, obscuring the central pelvis. Multifocal degenerative change, without a generalized scintigraphic pattern of osseous metastatic disease. Mri Brain W Wo Contrast    Result Date: 10/22/2020  EXAMINATION: MRI OF THE BRAIN WITHOUT AND WITH CONTRAST  10/22/2020 9:39 am TECHNIQUE: Multiplanar multisequence MRI of the head/brain was performed without and with the administration of intravenous contrast. COMPARISON: None.  HISTORY: ORDERING SYSTEM PROVIDED HISTORY: small cell eval brain mets TECHNOLOGIST PROVIDED HISTORY: Reason for exam:->small cell eval brain mets Reason for Exam: Small Cell cancer, Eval for mets Acuity: Unknown Type of Exam: Initial Relevant Medical/Surgical History: Lung cancer FINDINGS: Motion degrades images limiting evaluation. INTRACRANIAL STRUCTURES/VENTRICLES:  There is no acute infarct. No mass effect or midline shift. No evidence of an acute intracranial hemorrhage. Areas of T2 FLAIR hyperintensity are seen in the periventricular and subcortical white matter, which are nonspecific, but may represent chronic microvascular ischemic change. There is prominence of the ventricles and sulci due to global parenchymal volume loss. The sellar/suprasellar regions appear unremarkable. The normal signal voids within the major intracranial vessels appear maintained. No abnormal focus of enhancement is seen within the brain. ORBITS: The visualized portion of the orbits demonstrate no acute abnormality. SINUSES: Minimal scattered mucosal thickening of the paranasal sinuses. Bilateral mastoid effusions, left greater than right. BONES/SOFT TISSUES: The bone marrow signal intensity is not well evaluated due to motion the soft tissues demonstrate no acute abnormality. 1. Patient motion degrades images limiting evaluation. 2. No convincing acute intracranial abnormality. No acute infarct. 3. No gross evidence to suggest intracranial metastatic disease. 4. Mild global parenchymal volume loss with mild chronic microvascular ischemic changes. 5. Bilateral mastoid effusions, left greater than right.        Problem List  Patient Active Problem List   Diagnosis    Type 2 diabetes mellitus with circulatory disorder ([address]    Mixed hyperlipidemia    Essential hypertension    Phantom limb syndrome with pain (HCC)    Tobacco abuse    History of substance abuse ([address]    Chronic pain syndrome    Lumbosacral spondylosis without myelopathy    Superior vena cava compression syndrome    Mediastinal mass    Pulmonary embolus (HCC)    Lung cancer (HCC)    Atypical pneumonia    Pneumonia    Pulmonary infiltrate    Centrilobular emphysema (HCC)    SOB (shortness of breath)       ASSESSMENT AND PLAN    Small cell lung cancer-dx beginning of oct/end sept  He has had one cycle of chemo inpt   He is due next week for round 2 -will play by ear if we should dose Monday or not-  He is on anticoag-ie heparin   Can be on noac upon dc   HIs enlarged nodes may be inflammatory   I reviewed case with rad onc-no urgent radiation for now  I plan to incorporate around cycle 3 of chemo if not sooner-just not inpt-  The logisitcs of this -due to him being at a care facility need to be ironed out-if we started now we have ot do xrt at [address] [place] [address] or [place] as the [employer] machine is going to a new machine-ready nov 12        HYponatremia I suspect is siadh -\  Mental status is at baseline   MRI neg  IMproved             Will plan next chemo as outpt yolisley depending on how long he is \here He is due 606/706 [address] [facility]ris on if to give or hold a week pending  IF we really thinkm its all pe related I would treat   PNeumonia somewhat non specific currently       Revision History         Madonna Collier MD

## 2020-10-23 NOTE — PROGRESS NOTES
Patients BP running high, NP notified, new orders for home med's put in, to start now. Will continue to monitor.

## 2020-10-23 NOTE — CARE COORDINATION
DISCHARGE SUMMARY     DATE OF DISCHARGE: 10/23/20    DISCHARGE DESTINATION: [address] [place] [address]    Level of Care: Long Term    Report Number: [phone]    Fax Number:  [phone]    Precert Obtained: N/A    Portillo Completed: N/A    PASARR: N/A    Notified: Patient, RN, Facility    Prescriptions Faxed:no    TRANSPORTATION: [place] [address] Name: [address] [place] [address] up Time: 1:30 PM    Phone Number: [phone]    NEW DME ORDERED: no  Electronically signed by Elvira Ricci RN on 10/23/2020 at 10:52 AM

## 2020-10-23 NOTE — PROGRESS NOTES
Clinical Pharmacy Note  Heparin Dosing       Lab Results   Component Value Date    APTT 63.3 10/23/2020     Lab Results   Component Value Date    HGB 10.6 10/22/2020    HCT 30.7 10/22/2020     10/22/2020    INR 1.11 09/30/2020       Current Infusion Rate: 16.7 mL/hr    Plan:  Rate: continue at 16.7 mL/hr  Next aPTT: 0900 10/23/20    Pharmacy will continue to monitor and adjust based on aPTT results.   Abby Phipps, PharmD

## 2020-10-23 NOTE — CARE COORDINATION
TAYLOR received phone call from Catia Cruz in admissions at [facility] [place]. [employer]. She stated that the patient returned and they weren't aware that he'd be arriving. She stated that she wanted to make sure that he was COVID negative. SW faxed COVID results and after visit summary. No further needs. Respectfully submitted,    JUSTO Nelson  [facility]   [phone]    Electronically signed by JUSTO Breaux on 10/23/2020 at 4:45 PM

## 2020-10-23 NOTE — CONSULTS
Consult received  Full note to follow    Hakeem Robles  10/22/2020    [facility] [address]  Office : [phone]  Fax :[phone]

## 2020-10-23 NOTE — PLAN OF CARE
Problem: Falls - Risk of:  Goal: Will remain free from falls  Description: Will remain free from falls  10/23/2020 0015 by Favian Bernstein RN  Outcome: Ongoing  10/22/2020 1212 by Andi Tipton RN  Outcome: Ongoing  Goal: Absence of physical injury  Description: Absence of physical injury  10/23/2020 0015 by Favian Bernstein RN  Outcome: Ongoing  10/22/2020 1212 by Andi Tipton RN  Outcome: Ongoing     Problem: Skin Integrity:  Goal: Will show no infection signs and symptoms  Description: Will show no infection signs and symptoms  10/23/2020 0015 by Favian Bernstein RN  Outcome: Ongoing  10/22/2020 1212 by Andi Tipton RN  Outcome: Ongoing  Goal: Absence of new skin breakdown  Description: Absence of new skin breakdown  10/23/2020 0015 by Favian Bernstein RN  Outcome: Ongoing  10/22/2020 1212 by Andi Tipton RN  Outcome: Ongoing

## 2020-10-26 LAB
BLOOD CULTURE, ROUTINE: NORMAL
CULTURE, BLOOD 2: NORMAL

## 2020-10-26 NOTE — DISCHARGE SUMMARY
Hospital Medicine Discharge Summary    Patient ID: Evonnie Number      Patient's PCP: Heidi Cosme MD    Admit Date: 10/22/2020     Discharge Date: 10/23/2020      Admitting Physician: Carlos Carroll DO     Discharge Physician: Merlinda Redden, MD     Discharge Diagnoses: Active Hospital Problems    Diagnosis Date Noted    Pulmonary embolus ([address] [I26.99] 10/22/2020    Lung cancer ([address] [C34.90] 10/22/2020    Atypical pneumonia [J18.9] 10/22/2020    Pneumonia [J18.9] 10/22/2020    Pulmonary infiltrate [R91.8]     Centrilobular emphysema (HCC) [J43.2]     SOB (shortness of breath) [R06.02]        The patient was seen and examined on day of discharge and this discharge summary is in conjunction with any daily progress note from day of discharge. Hospital Course:     Small cell lung cancer:  - Started chemo a few weeks ago  - defer to oncology if needs radiation for SOB    Subsegmental acute PE:  - see on Right lower lobe posterior basal branch  - Heparin while inpatient. Can D/C with Eliquis.     SOB:  resolving  - Consult Heme Onc and pulmonary to r/o SVC syndome as contribution to SOB  - pulmonary consulted to determine if other cause for SOB besides cancer --> no further intervention, cont chemo per heme/onc   - zosyn for potential pneumonia     Hyponatremia:  - acute on chronic  - likely due to small cell lung cancer  - consult nephrology --> intervention  - MRI without metastases      Exam:     /72   Pulse 82   Temp 97.5 °F (36.4 °C) (Oral)   Resp 18   Ht 6' (1.829 m)   Wt 181 lb 3.5 oz (82.2 kg)   SpO2 96%   BMI 24.58 kg/m²     General appearance: No apparent distress, appears stated age and cooperative. HEENT: Pupils equal, round, and reactive to light. Conjunctivae/corneas clear. Neck: Supple, with full range of motion. No jugular venous distention. Trachea midline. Respiratory:  Normal respiratory effort.  Clear to auscultation, bilaterally without Rales/Wheezes/Rhonchi. Cardiovascular: Regular rate and rhythm with normal S1/S2 without murmurs, rubs or gallops. Abdomen: Soft, non-tender, non-distended with normal bowel sounds. Musculoskelatal: No clubbing, cyanosis or edema bilaterally. Full range of motion without deformity. Skin: Skin color, texture, turgor normal.  No rashes or lesions. Neurologic:  Neurovascularly intact without any focal sensory/motor deficits. Cranial nerves: II-XII intact, grossly non-focal.  Psychiatric: Alert and oriented, thought content appropriate, normal insight      Consults:     IP CONSULT TO PULMONOLOGY  IP CONSULT TO ONCOLOGY  IP CONSULT TO NEPHROLOGY    Significant Diagnostic Studies:       MRI BRAIN W WO CONTRAST [[id number]]  Collected: 10/22/20 1054        Order Status: Completed  Updated: 10/22/20 1102       Narrative:         EXAMINATION:   MRI OF THE BRAIN WITHOUT AND WITH CONTRAST  10/22/2020 9:39 am     TECHNIQUE:   Multiplanar multisequence MRI of the head/brain was performed without and   with the administration of intravenous contrast.     COMPARISON:   None. HISTORY:   ORDERING SYSTEM PROVIDED HISTORY: small cell eval brain mets   TECHNOLOGIST PROVIDED HISTORY:   Reason for exam:->small cell eval brain mets   Reason for Exam: Small Cell cancer, Eval for mets   Acuity: Unknown   Type of Exam: Initial   Relevant Medical/Surgical History: Lung cancer     FINDINGS:   Motion degrades images limiting evaluation. INTRACRANIAL STRUCTURES/VENTRICLES: Milagros Ovi is no acute infarct. No mass   effect or midline shift. No evidence of an acute intracranial hemorrhage.    Areas of T2 FLAIR hyperintensity are seen in the periventricular and   subcortical white matter, which are nonspecific, but may represent chronic   microvascular ischemic change. Milagros Ovi is prominence of the ventricles and   sulci due to global parenchymal volume loss.   The sellar/suprasellar regions   appear unremarkable.  The normal signal voids within the major intracranial   vessels appear maintained.  No abnormal focus of enhancement is seen within   the brain. ORBITS: The visualized portion of the orbits demonstrate no acute abnormality. SINUSES: Minimal scattered mucosal thickening of the paranasal sinuses. Bilateral mastoid effusions, left greater than right. BONES/SOFT TISSUES: The bone marrow signal intensity is not well evaluated   due to motion the soft tissues demonstrate no acute abnormality.       Impression:         1. Patient motion degrades images limiting evaluation. 2. No convincing acute intracranial abnormality.  No acute infarct. 3. No gross evidence to suggest intracranial metastatic disease. 4. Mild global parenchymal volume loss with mild chronic microvascular   ischemic changes. 5. Bilateral mastoid effusions, left greater than right.       CT CHEST PULMONARY EMBOLISM W CONTRAST [[id number]]  Collected: 10/22/20 0353       Order Status: Completed  Updated: 10/22/20 0411       Narrative:         EXAMINATION:   CTA OF THE CHEST 10/22/2020 3:33 am     TECHNIQUE:   CTA of the chest was performed after the administration of intravenous   contrast.  Multiplanar reformatted images are provided for review.  MIP   images are provided for review. Dose modulation, iterative reconstruction,   and/or weight based adjustment of the mA/kV was utilized to reduce the   radiation dose to as low as reasonably achievable.      COMPARISON:   CT abdomen pelvis 09/29/2020; CT chest 09/28/2020     HISTORY:   ORDERING SYSTEM PROVIDED HISTORY: History of lung cancer short of breath   TECHNOLOGIST PROVIDED HISTORY:   Reason for exam:->History of lung cancer short of breath   Reason for Exam: History of lung cancer short of breath   Acuity: Acute   Type of Exam: Initial     FINDINGS:   Pulmonary Arteries: Pulmonary arteries are adequately opacified for   evaluation.  Focal filling defect within the right lower lobe posterior basal subsegmental pulmonary artery.  Main pulmonary artery is normal in caliber. In stranding compression of right hilar bronchovascular structures and   obliteration of the right upper lobe pulmonary artery. Mediastinum: Increased size of infiltrative mediastinal/right hilar mass   measuring 8.9 x 7.5 cm in axial plane and 8.4 cm craniocaudal previously this   measured 8.2 x 6.7 x 8.0 cm.  Severe narrowing of the downstream SVC with   resultant mediastinal and left chest collaterals and filling of the azygos   system.  The azygos-SVC confluence is obliterated.  Obliteration of the right   upper lobe lobar pulmonary artery, superior right pulmonary vein and mass   effect and right upper lobe bronchial structures.  Right hilar and   mediastinal lymphadenopathy.  Normal caliber thoracic aorta.  Multivessel   coronary calcifications.  No pericardial effusion. Lungs/pleura: Small right pleural effusion.  Ground-glass abnormality   surrounds the right hilar/mediastinal mass.  Bronchial walls are thickened in   this distribution and there is interlobular septal thickening.  Fitzgerald nodule   in the subpleural left lower lobe is most suspicious for benign parenchymal   lymph node.  Mild centrilobular emphysema.  Patchy nodular opacities in the   right upper lobe. Upper Abdomen: Limited images of the upper abdomen are unremarkable for any   acute findings. Nodular appearance of the left adrenal gland is unchanged,   obscured by motion on today's exam.     Soft Tissues/Bones: Colonic interposition with gas-filled transverse colon. No suspicious osteolytic or osteoblastic lesion.       Impression:         Subsegmental acute pulmonary embolism in the right lower lobe posterior basal   branch. Interval increase in size of infiltrative right hilar/mediastinal mass with   extrinsic compression/invasion of bronchovascular structures, as described. Metastatic right hilar and mediastinal lymph nodes. Ground-glass halo has developed in the right upper lobe along the   distribution of mass, either representing lymphatic/venous congestion,   locoregional disease spread or postobstructive infection.  More nodular   opacities in the right upper lobe are suspicious for the latter as well. Critical results were called by Dr. Thad Rascon to [id number] [place] [address] on 10/22/2020 at 04:07.       XR CHEST PORTABLE [[id number]]  Collected: 10/22/20 0216       Order Status: Completed  Updated: 10/22/20 0221       Narrative:         EXAMINATION:   ONE XRAY VIEW OF THE CHEST     10/22/2020 1:50 am     COMPARISON:   Chest radiographs 10/14/2020, 10/03/2020; CT chest 09/28/2020     HISTORY:   ORDERING SYSTEM PROVIDED HISTORY: SOB   TECHNOLOGIST PROVIDED HISTORY:   Reason for exam:->SOB   Reason for Exam: sob     FINDINGS:   Unchanged right perihilar convexity/masslike opacity.  No new focal airspace   disease.  No pneumothorax.  Blunted left costophrenic sulcus.  Indistinct   right costophrenic sulcus.  Elevated right hemidiaphragm with colonic   interposition.  Stable heart size.  Right chest port catheter tip terminates   over the SVC.       Impression:         Small bilateral pleural effusions with unchanged right hilar mass. 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             PCP/SNF to follow up: Chronic cancer management. PE management    Disposition:  Home    Condition on D/C:  Stable     Discharge Instructions/Follow-up:  F/u with Heme/Onc and PCP within 1 week    Code Status:  Prior     Activity: activity as tolerated    Diet: Regular    Labs:  For convenience and continuity at follow-up the following most recent labs are provided:      CBC:    Lab Results   Component Value Date    WBC 14.0 10/23/2020    HGB 11.0 10/23/2020    HCT 33.0 10/23/2020     10/23/2020       Renal:    Lab Results   Component Value Date     10/23/2020    K 3.8 10/23/2020    K 4.1 10/22/2020    CL 92 10/23/2020    CO2 28 10/23/2020    BUN 4 10/23/2020    CREATININE 0.6 10/23/2020    CALCIUM 8.4 10/23/2020    PHOS 3.1 10/04/2020       Discharge Medications:     Discharge Medication List as of 10/23/2020  2:54 PM           Details   amoxicillin-clavulanate (AUGMENTIN) 875-125 MG per tablet Take 1 tablet by mouth 2 times daily for 5 days, Disp-10 tablet,R-0Print              Details   tiotropium (SPIRIVA) 18 MCG inhalation capsule Inhale 18 mcg into the lungs 2 times dailyHistorical Med      benzonatate (TESSALON PERLES) 100 MG capsule Take 1 capsule by mouth 3 times daily as needed for Cough, Disp-20 capsule,R-0Print      cetirizine (ZYRTEC) 10 MG tablet Take 1 tablet by mouth daily, Disp-30 tablet,R-0Print      allopurinol (ZYLOPRIM) 300 MG tablet Take 1 tablet by mouth daily, Disp-30 tablet,R-3Print      nicotine (NICODERM CQ) 14 MG/24HR Place 1 patch onto the skin daily, Disp-30 patch,R-3Print      diphenhydrAMINE (BENADRYL) 25 MG tablet Take 12.5 mg by mouth every 6 hours as needed for ItchingHistorical Med      hydrALAZINE (APRESOLINE) 25 MG tablet Take 25 mg by mouth 3 times dailyHistorical Med      potassium chloride (MICRO-K) 10 MEQ extended release capsule Take 10 mEq by mouth dailyHistorical Med      Lidocaine-Glycerin 5-14.4 % CREA Place rectally every 6 hours as needed (hemorroids)Historical Med      labetalol (NORMODYNE) 300 MG tablet Take 1 tablet by mouth 2 times daily, Disp-180 tablet, R-5Normal      cloNIDine (CATAPRES) 0.3 MG tablet Take 1 tablet by mouth 3 times daily, Disp-180 tablet, R-5Normal      aspirin 325 MG tablet Take 1 tablet by mouth daily, Disp-90 tablet, R-5Normal      omeprazole (PRILOSEC) 20 MG delayed release capsule Take 1 capsule by mouth Daily, Disp-90 capsule, R-5Normal      rosuvastatin (CRESTOR) 5 MG tablet Take 1 tablet by mouth nightly, Disp-90 tablet, R-5Normal      DULoxetine (CYMBALTA) 60 MG extended release capsule Take 1 capsule by mouth daily, Disp-90 capsule, R-5Normal      escitalopram (LEXAPRO) 10 MG tablet TAKE ONE TABLET BY MOUTH DAILY, Disp-30 tablet, R-4Normal             Time Spent on discharge is more than 30 minutes in the examination, evaluation, counseling and review of medications and discharge plan. Signed: Gabo France MD   10/26/2020      Thank you Elina Murrieta MD for the opportunity to be involved in this patient's care. If you have any questions or concerns please feel free to contact me at [phone].

## 2020-10-26 NOTE — PROGRESS NOTES
Physician Progress Note      Brittanie Hughes  [facility] #:                  [id number]  :                       1960  ADMIT DATE:       10/22/2020 1:42 AM  DISCH DATE:        10/23/2020 4:20 PM  RESPONDING  PROVIDER #:        Matt Ernst MD          QUERY TEXT:    Pt admitted with PE. Pt noted to have home oxygen therapy. If possible,   please document in the progress notes and discharge summary if you are   evaluating and/or treating any of the following: The medical record reflects the following:  Risk Factors: Lung cancer, PE Pneumonia  Clinical Indicators: per ED Provider \"wears 3L/NC baseline, 4L/NC at this   time\",  Treatment: Oxygen therapy  Options provided:  -- Chronic respiratory failure with hypoxia  -- Acute on chronic respiratory failure with hypoxia  -- Other - I will add my own diagnosis  -- Disagree - Not applicable / Not valid  -- Disagree - Clinically unable to determine / Unknown  -- Refer to Clinical Documentation Reviewer    PROVIDER RESPONSE TEXT:    This patient has chronic respiratory failure with hypoxia.     Query created by: Norris Zamorano on 10/23/2020 11:25 AM      Electronically signed by:  Matt Ernst MD 10/26/2020 11:32 AM

## 2020-11-03 PROBLEM — J18.9 PNEUMONIA: Status: RESOLVED | Noted: 2020-10-22 | Resolved: 2020-11-03

## 2020-11-05 ENCOUNTER — HOSPITAL ENCOUNTER (OUTPATIENT)
Age: 60
Setting detail: OBSERVATION
Discharge: HOME | End: 2020-11-06
Attending: EMERGENCY MEDICINE | Admitting: STUDENT IN AN ORGANIZED HEALTH CARE EDUCATION/TRAINING PROGRAM
Payer: MEDICAID

## 2020-11-05 LAB
A/G RATIO: 1.3 (ref 1.1–2.2)
ALBUMIN SERPL-MCNC: 3.8 G/DL (ref 3.4–5)
ALP BLD-CCNC: 115 U/L (ref 40–129)
ALT SERPL-CCNC: 20 U/L (ref 10–40)
ANION GAP SERPL CALCULATED.3IONS-SCNC: 14 MMOL/L (ref 3–16)
AST SERPL-CCNC: 22 U/L (ref 15–37)
BASOPHILS ABSOLUTE: 0 K/UL (ref 0–0.2)
BASOPHILS RELATIVE PERCENT: 1.1 %
BILIRUB SERPL-MCNC: <0.2 MG/DL (ref 0–1)
BUN BLDV-MCNC: 4 MG/DL (ref 7–20)
CALCIUM SERPL-MCNC: 8.6 MG/DL (ref 8.3–10.6)
CHLORIDE BLD-SCNC: 95 MMOL/L (ref 99–110)
CO2: 24 MMOL/L (ref 21–32)
CREAT SERPL-MCNC: <0.5 MG/DL (ref 0.8–1.3)
EOSINOPHILS ABSOLUTE: 0 K/UL (ref 0–0.6)
EOSINOPHILS RELATIVE PERCENT: 0.8 %
GFR AFRICAN AMERICAN: >60
GFR NON-AFRICAN AMERICAN: >60
GLOBULIN: 3 G/DL
GLUCOSE BLD-MCNC: 117 MG/DL (ref 70–99)
HCT VFR BLD CALC: 33.1 % (ref 40.5–52.5)
HEMOGLOBIN: 11.2 G/DL (ref 13.5–17.5)
LYMPHOCYTES ABSOLUTE: 0.7 K/UL (ref 1–5.1)
LYMPHOCYTES RELATIVE PERCENT: 20.3 %
MAGNESIUM: 2.2 MG/DL (ref 1.8–2.4)
MCH RBC QN AUTO: 29.6 PG (ref 26–34)
MCHC RBC AUTO-ENTMCNC: 33.8 G/DL (ref 31–36)
MCV RBC AUTO: 87.6 FL (ref 80–100)
MONOCYTES ABSOLUTE: 0.2 K/UL (ref 0–1.3)
MONOCYTES RELATIVE PERCENT: 6.3 %
NEUTROPHILS ABSOLUTE: 2.6 K/UL (ref 1.7–7.7)
NEUTROPHILS RELATIVE PERCENT: 71.5 %
PDW BLD-RTO: 15.9 % (ref 12.4–15.4)
PLATELET # BLD: 409 K/UL (ref 135–450)
PMV BLD AUTO: 6.6 FL (ref 5–10.5)
POTASSIUM REFLEX MAGNESIUM: 3.5 MMOL/L (ref 3.5–5.1)
RBC # BLD: 3.78 M/UL (ref 4.2–5.9)
SODIUM BLD-SCNC: 133 MMOL/L (ref 136–145)
TOTAL PROTEIN: 6.8 G/DL (ref 6.4–8.2)
WBC # BLD: 3.6 K/UL (ref 4–11)

## 2020-11-05 PROCEDURE — 85025 COMPLETE CBC W/AUTO DIFF WBC: CPT

## 2020-11-05 PROCEDURE — 99285 EMERGENCY DEPT VISIT HI MDM: CPT

## 2020-11-05 PROCEDURE — 83735 ASSAY OF MAGNESIUM: CPT

## 2020-11-05 PROCEDURE — 80053 COMPREHEN METABOLIC PANEL: CPT

## 2020-11-05 PROCEDURE — 36415 COLL VENOUS BLD VENIPUNCTURE: CPT

## 2020-11-06 ENCOUNTER — APPOINTMENT (OUTPATIENT)
Dept: GENERAL RADIOLOGY | Age: 60
End: 2020-11-06
Payer: MEDICAID

## 2020-11-06 ENCOUNTER — HOSPITAL ENCOUNTER (OUTPATIENT)
Age: 60
Setting detail: OBSERVATION
Discharge: LEFT BEFORE DISCHARGE | End: 2020-11-07
Attending: STUDENT IN AN ORGANIZED HEALTH CARE EDUCATION/TRAINING PROGRAM | Admitting: STUDENT IN AN ORGANIZED HEALTH CARE EDUCATION/TRAINING PROGRAM
Payer: MEDICAID

## 2020-11-06 ENCOUNTER — APPOINTMENT (OUTPATIENT)
Dept: CT IMAGING | Age: 60
End: 2020-11-06
Payer: MEDICAID

## 2020-11-06 VITALS
OXYGEN SATURATION: 96 % | DIASTOLIC BLOOD PRESSURE: 111 MMHG | SYSTOLIC BLOOD PRESSURE: 180 MMHG | HEIGHT: 72 IN | WEIGHT: 164.46 LBS | HEART RATE: 92 BPM | RESPIRATION RATE: 16 BRPM | TEMPERATURE: 97.8 F | BODY MASS INDEX: 22.28 KG/M2

## 2020-11-06 PROBLEM — R53.1 WEAKNESS: Status: ACTIVE | Noted: 2020-11-06

## 2020-11-06 PROBLEM — I10 HYPERTENSION: Status: ACTIVE | Noted: 2020-11-06

## 2020-11-06 LAB
A/G RATIO: 1.4 (ref 1.1–2.2)
ALBUMIN SERPL-MCNC: 3.6 G/DL (ref 3.4–5)
ALP BLD-CCNC: 108 U/L (ref 40–129)
ALT SERPL-CCNC: 17 U/L (ref 10–40)
ANION GAP SERPL CALCULATED.3IONS-SCNC: 13 MMOL/L (ref 3–16)
AST SERPL-CCNC: 16 U/L (ref 15–37)
BASOPHILS ABSOLUTE: 0 K/UL (ref 0–0.2)
BASOPHILS RELATIVE PERCENT: 1.1 %
BILIRUB SERPL-MCNC: 0.3 MG/DL (ref 0–1)
BUN BLDV-MCNC: 6 MG/DL (ref 7–20)
CALCIUM SERPL-MCNC: 8.4 MG/DL (ref 8.3–10.6)
CHLORIDE BLD-SCNC: 95 MMOL/L (ref 99–110)
CO2: 23 MMOL/L (ref 21–32)
CREAT SERPL-MCNC: <0.5 MG/DL (ref 0.8–1.3)
EKG ATRIAL RATE: 95 BPM
EKG DIAGNOSIS: NORMAL
EKG P AXIS: 69 DEGREES
EKG P-R INTERVAL: 166 MS
EKG Q-T INTERVAL: 382 MS
EKG QRS DURATION: 98 MS
EKG QTC CALCULATION (BAZETT): 480 MS
EKG R AXIS: -38 DEGREES
EKG T AXIS: 49 DEGREES
EKG VENTRICULAR RATE: 95 BPM
EOSINOPHILS ABSOLUTE: 0 K/UL (ref 0–0.6)
EOSINOPHILS RELATIVE PERCENT: 0.6 %
GFR AFRICAN AMERICAN: >60
GFR NON-AFRICAN AMERICAN: >60
GLOBULIN: 2.5 G/DL
GLUCOSE BLD-MCNC: 126 MG/DL (ref 70–99)
HCT VFR BLD CALC: 32.2 % (ref 40.5–52.5)
HEMOGLOBIN: 10.9 G/DL (ref 13.5–17.5)
LYMPHOCYTES ABSOLUTE: 0.5 K/UL (ref 1–5.1)
LYMPHOCYTES RELATIVE PERCENT: 12.6 %
MCH RBC QN AUTO: 29.4 PG (ref 26–34)
MCHC RBC AUTO-ENTMCNC: 33.8 G/DL (ref 31–36)
MCV RBC AUTO: 87.2 FL (ref 80–100)
MONOCYTES ABSOLUTE: 0.4 K/UL (ref 0–1.3)
MONOCYTES RELATIVE PERCENT: 8.8 %
NEUTROPHILS ABSOLUTE: 3.3 K/UL (ref 1.7–7.7)
NEUTROPHILS RELATIVE PERCENT: 76.9 %
PDW BLD-RTO: 15.8 % (ref 12.4–15.4)
PLATELET # BLD: 383 K/UL (ref 135–450)
PMV BLD AUTO: 6.4 FL (ref 5–10.5)
POTASSIUM REFLEX MAGNESIUM: 3.7 MMOL/L (ref 3.5–5.1)
PRO-BNP: 406 PG/ML (ref 0–124)
RBC # BLD: 3.69 M/UL (ref 4.2–5.9)
SARS-COV-2, NAAT: NOT DETECTED
SODIUM BLD-SCNC: 131 MMOL/L (ref 136–145)
TOTAL PROTEIN: 6.1 G/DL (ref 6.4–8.2)
TROPONIN: <0.01 NG/ML
WBC # BLD: 4.3 K/UL (ref 4–11)

## 2020-11-06 PROCEDURE — 71046 X-RAY EXAM CHEST 2 VIEWS: CPT

## 2020-11-06 PROCEDURE — 84484 ASSAY OF TROPONIN QUANT: CPT

## 2020-11-06 PROCEDURE — 83880 ASSAY OF NATRIURETIC PEPTIDE: CPT

## 2020-11-06 PROCEDURE — 80053 COMPREHEN METABOLIC PANEL: CPT

## 2020-11-06 PROCEDURE — 6370000000 HC RX 637 (ALT 250 FOR IP): Performed by: NURSE PRACTITIONER

## 2020-11-06 PROCEDURE — G0378 HOSPITAL OBSERVATION PER HR: HCPCS

## 2020-11-06 PROCEDURE — U0002 COVID-19 LAB TEST NON-CDC: HCPCS

## 2020-11-06 PROCEDURE — 93005 ELECTROCARDIOGRAM TRACING: CPT | Performed by: EMERGENCY MEDICINE

## 2020-11-06 PROCEDURE — 93010 ELECTROCARDIOGRAM REPORT: CPT | Performed by: INTERNAL MEDICINE

## 2020-11-06 PROCEDURE — 85025 COMPLETE CBC W/AUTO DIFF WBC: CPT

## 2020-11-06 PROCEDURE — 71260 CT THORAX DX C+: CPT

## 2020-11-06 PROCEDURE — 99284 EMERGENCY DEPT VISIT MOD MDM: CPT

## 2020-11-06 PROCEDURE — 6360000004 HC RX CONTRAST MEDICATION: Performed by: PHYSICIAN ASSISTANT

## 2020-11-06 PROCEDURE — 94640 AIRWAY INHALATION TREATMENT: CPT

## 2020-11-06 PROCEDURE — 94760 N-INVAS EAR/PLS OXIMETRY 1: CPT

## 2020-11-06 PROCEDURE — 6370000000 HC RX 637 (ALT 250 FOR IP): Performed by: PHYSICIAN ASSISTANT

## 2020-11-06 RX ORDER — SODIUM CHLORIDE 0.9 % (FLUSH) 0.9 %
10 SYRINGE (ML) INJECTION PRN
Status: DISCONTINUED | OUTPATIENT
Start: 2020-11-06 | End: 2020-11-06 | Stop reason: HOSPADM

## 2020-11-06 RX ORDER — SODIUM CHLORIDE 0.9 % (FLUSH) 0.9 %
10 SYRINGE (ML) INJECTION EVERY 12 HOURS SCHEDULED
Status: DISCONTINUED | OUTPATIENT
Start: 2020-11-06 | End: 2020-11-06 | Stop reason: HOSPADM

## 2020-11-06 RX ORDER — POLYETHYLENE GLYCOL 3350 17 G/17G
17 POWDER, FOR SOLUTION ORAL DAILY PRN
Status: DISCONTINUED | OUTPATIENT
Start: 2020-11-06 | End: 2020-11-06 | Stop reason: HOSPADM

## 2020-11-06 RX ORDER — HYDRALAZINE HYDROCHLORIDE 25 MG/1
25 TABLET, FILM COATED ORAL ONCE
Status: COMPLETED | OUTPATIENT
Start: 2020-11-06 | End: 2020-11-06

## 2020-11-06 RX ORDER — ACETAMINOPHEN 650 MG/1
650 SUPPOSITORY RECTAL EVERY 6 HOURS PRN
Status: DISCONTINUED | OUTPATIENT
Start: 2020-11-06 | End: 2020-11-06 | Stop reason: HOSPADM

## 2020-11-06 RX ORDER — ALLOPURINOL 300 MG/1
300 TABLET ORAL DAILY
Status: DISCONTINUED | OUTPATIENT
Start: 2020-11-07 | End: 2020-11-07 | Stop reason: HOSPADM

## 2020-11-06 RX ORDER — CLONIDINE HYDROCHLORIDE 0.1 MG/1
0.1 TABLET ORAL ONCE
Status: COMPLETED | OUTPATIENT
Start: 2020-11-06 | End: 2020-11-06

## 2020-11-06 RX ORDER — POTASSIUM CHLORIDE 750 MG/1
10 TABLET, FILM COATED, EXTENDED RELEASE ORAL DAILY
Status: DISCONTINUED | OUTPATIENT
Start: 2020-11-06 | End: 2020-11-06 | Stop reason: HOSPADM

## 2020-11-06 RX ORDER — ONDANSETRON 2 MG/ML
4 INJECTION INTRAMUSCULAR; INTRAVENOUS EVERY 6 HOURS PRN
Status: DISCONTINUED | OUTPATIENT
Start: 2020-11-06 | End: 2020-11-06 | Stop reason: HOSPADM

## 2020-11-06 RX ORDER — ROSUVASTATIN CALCIUM 10 MG/1
5 TABLET, COATED ORAL NIGHTLY
Status: DISCONTINUED | OUTPATIENT
Start: 2020-11-06 | End: 2020-11-06 | Stop reason: HOSPADM

## 2020-11-06 RX ORDER — ASPIRIN 325 MG
325 TABLET ORAL DAILY
Status: DISCONTINUED | OUTPATIENT
Start: 2020-11-06 | End: 2020-11-06 | Stop reason: HOSPADM

## 2020-11-06 RX ORDER — ROSUVASTATIN CALCIUM 10 MG/1
5 TABLET, COATED ORAL NIGHTLY
Status: DISCONTINUED | OUTPATIENT
Start: 2020-11-06 | End: 2020-11-07 | Stop reason: HOSPADM

## 2020-11-06 RX ORDER — DULOXETIN HYDROCHLORIDE 60 MG/1
60 CAPSULE, DELAYED RELEASE ORAL DAILY
Status: DISCONTINUED | OUTPATIENT
Start: 2020-11-07 | End: 2020-11-07 | Stop reason: HOSPADM

## 2020-11-06 RX ORDER — DULOXETIN HYDROCHLORIDE 60 MG/1
60 CAPSULE, DELAYED RELEASE ORAL DAILY
Status: DISCONTINUED | OUTPATIENT
Start: 2020-11-06 | End: 2020-11-06 | Stop reason: HOSPADM

## 2020-11-06 RX ORDER — OMEPRAZOLE 20 MG/1
20 CAPSULE, DELAYED RELEASE ORAL DAILY
Status: DISCONTINUED | OUTPATIENT
Start: 2020-11-06 | End: 2020-11-06 | Stop reason: HOSPADM

## 2020-11-06 RX ORDER — ASPIRIN 325 MG
325 TABLET ORAL DAILY
Status: DISCONTINUED | OUTPATIENT
Start: 2020-11-07 | End: 2020-11-07 | Stop reason: HOSPADM

## 2020-11-06 RX ORDER — ESCITALOPRAM OXALATE 10 MG/1
10 TABLET ORAL DAILY
Status: DISCONTINUED | OUTPATIENT
Start: 2020-11-07 | End: 2020-11-07 | Stop reason: HOSPADM

## 2020-11-06 RX ORDER — HYDRALAZINE HYDROCHLORIDE 25 MG/1
25 TABLET, FILM COATED ORAL 3 TIMES DAILY
Status: DISCONTINUED | OUTPATIENT
Start: 2020-11-06 | End: 2020-11-07 | Stop reason: HOSPADM

## 2020-11-06 RX ORDER — PROCHLORPERAZINE MALEATE 10 MG
10 TABLET ORAL EVERY 6 HOURS PRN
COMMUNITY

## 2020-11-06 RX ORDER — PROMETHAZINE HYDROCHLORIDE 25 MG/1
12.5 TABLET ORAL EVERY 6 HOURS PRN
Status: DISCONTINUED | OUTPATIENT
Start: 2020-11-06 | End: 2020-11-06 | Stop reason: HOSPADM

## 2020-11-06 RX ORDER — CLONIDINE HYDROCHLORIDE 0.1 MG/1
0.3 TABLET ORAL 3 TIMES DAILY
Status: DISCONTINUED | OUTPATIENT
Start: 2020-11-06 | End: 2020-11-06 | Stop reason: HOSPADM

## 2020-11-06 RX ORDER — ESCITALOPRAM OXALATE 10 MG/1
10 TABLET ORAL DAILY
Status: DISCONTINUED | OUTPATIENT
Start: 2020-11-06 | End: 2020-11-06 | Stop reason: HOSPADM

## 2020-11-06 RX ORDER — HYDRALAZINE HYDROCHLORIDE 25 MG/1
25 TABLET, FILM COATED ORAL 3 TIMES DAILY
Status: DISCONTINUED | OUTPATIENT
Start: 2020-11-06 | End: 2020-11-06 | Stop reason: HOSPADM

## 2020-11-06 RX ORDER — ACETAMINOPHEN 325 MG/1
650 TABLET ORAL EVERY 6 HOURS PRN
Status: DISCONTINUED | OUTPATIENT
Start: 2020-11-06 | End: 2020-11-06 | Stop reason: HOSPADM

## 2020-11-06 RX ORDER — POTASSIUM CHLORIDE 750 MG/1
10 TABLET, FILM COATED, EXTENDED RELEASE ORAL DAILY
Status: DISCONTINUED | OUTPATIENT
Start: 2020-11-07 | End: 2020-11-07 | Stop reason: HOSPADM

## 2020-11-06 RX ORDER — CLONIDINE HYDROCHLORIDE 0.1 MG/1
0.3 TABLET ORAL 3 TIMES DAILY
Status: DISCONTINUED | OUTPATIENT
Start: 2020-11-06 | End: 2020-11-07 | Stop reason: HOSPADM

## 2020-11-06 RX ORDER — ALLOPURINOL 300 MG/1
300 TABLET ORAL DAILY
Status: DISCONTINUED | OUTPATIENT
Start: 2020-11-06 | End: 2020-11-06 | Stop reason: HOSPADM

## 2020-11-06 RX ADMIN — DULOXETINE HYDROCHLORIDE 60 MG: 60 CAPSULE, DELAYED RELEASE ORAL at 08:29

## 2020-11-06 RX ADMIN — APIXABAN 5 MG: 5 TABLET, FILM COATED ORAL at 08:28

## 2020-11-06 RX ADMIN — CLONIDINE HYDROCHLORIDE 0.3 MG: 0.1 TABLET ORAL at 08:28

## 2020-11-06 RX ADMIN — CLONIDINE HYDROCHLORIDE 0.3 MG: 0.1 TABLET ORAL at 23:04

## 2020-11-06 RX ADMIN — ROSUVASTATIN CALCIUM 5 MG: 10 TABLET, FILM COATED ORAL at 23:03

## 2020-11-06 RX ADMIN — HYDRALAZINE HYDROCHLORIDE 25 MG: 25 TABLET, FILM COATED ORAL at 23:03

## 2020-11-06 RX ADMIN — TIOTROPIUM BROMIDE INHALATION SPRAY 2 PUFF: 3.12 SPRAY, METERED RESPIRATORY (INHALATION) at 08:56

## 2020-11-06 RX ADMIN — LABETALOL HYDROCHLORIDE 300 MG: 200 TABLET, FILM COATED ORAL at 08:29

## 2020-11-06 RX ADMIN — HYDRALAZINE HYDROCHLORIDE 25 MG: 25 TABLET, FILM COATED ORAL at 08:29

## 2020-11-06 RX ADMIN — POTASSIUM CHLORIDE 10 MEQ: 750 TABLET, EXTENDED RELEASE ORAL at 08:29

## 2020-11-06 RX ADMIN — APIXABAN 5 MG: 5 TABLET, FILM COATED ORAL at 23:04

## 2020-11-06 RX ADMIN — ESCITALOPRAM OXALATE 10 MG: 10 TABLET ORAL at 08:29

## 2020-11-06 RX ADMIN — ALLOPURINOL 300 MG: 300 TABLET ORAL at 08:28

## 2020-11-06 RX ADMIN — IOPAMIDOL 75 ML: 755 INJECTION, SOLUTION INTRAVENOUS at 17:05

## 2020-11-06 RX ADMIN — LABETALOL HYDROCHLORIDE 300 MG: 200 TABLET, FILM COATED ORAL at 23:02

## 2020-11-06 RX ADMIN — HYDRALAZINE HYDROCHLORIDE 25 MG: 25 TABLET, FILM COATED ORAL at 00:44

## 2020-11-06 RX ADMIN — ASPIRIN 325 MG ORAL TABLET 325 MG: 325 PILL ORAL at 08:29

## 2020-11-06 RX ADMIN — OMEPRAZOLE 20 MG: 20 CAPSULE, DELAYED RELEASE ORAL at 06:33

## 2020-11-06 RX ADMIN — CLONIDINE HYDROCHLORIDE 0.1 MG: 0.1 TABLET ORAL at 00:44

## 2020-11-06 ASSESSMENT — ENCOUNTER SYMPTOMS
COUGH: 0
NAUSEA: 0
DIARRHEA: 0
ABDOMINAL PAIN: 0
RECTAL PAIN: 0
EYE ITCHING: 0
APNEA: 0
EYE PAIN: 0
ANAL BLEEDING: 0
STRIDOR: 0
VOMITING: 0
EYE DISCHARGE: 0
COLOR CHANGE: 0
BLOOD IN STOOL: 0
CONSTIPATION: 0
SHORTNESS OF BREATH: 0
ABDOMINAL DISTENTION: 0
BACK PAIN: 0
CHOKING: 0
EYE REDNESS: 0
FACIAL SWELLING: 0
CHEST TIGHTNESS: 0
WHEEZING: 0
PHOTOPHOBIA: 0

## 2020-11-06 ASSESSMENT — PAIN SCALES - GENERAL
PAINLEVEL_OUTOF10: 0
PAINLEVEL_OUTOF10: 0

## 2020-11-06 ASSESSMENT — PAIN SCALES - WONG BAKER: WONGBAKER_NUMERICALRESPONSE: 0

## 2020-11-06 NOTE — ED TRIAGE NOTES
Moe Gutierrez is a 62 y.o. male brought himself to the ER for eval of SOB. The patient states he was seen eariler today and is still SOB. The patient is a smoker and currently  In treatment for lung CA. The patient states he came here because he has nowhere to go. The patient is alert and oriented with an open and patent airway with a normal respiratory effort.

## 2020-11-06 NOTE — PROGRESS NOTES
Patient asked to leave AMA since he couldn't go outside as a patient. AMA form presented to patient. Risks explained to patient. Patient verbalized an understanding. Patient states \"I just need some fresh air\". Charge nurse and physician made aware.

## 2020-11-06 NOTE — ED PROVIDER NOTES
[address] [facility]      Pt Name: Javon Wagner  MRN: [id number]  [place] 1960  Date of evaluation: 11/5/2020  Provider: Theron De Paz MD    CHIEF COMPLAINT       Chief Complaint   Patient presents with    Other     pt came in by squad after his wheelchair broke down and squad tried to take him back to the nursing home and they won't take him back because he signed himself out AMA. pt states he doesn't remeber signing anything        HISTORY OF PRESENT ILLNESS    Javon Wagner is a 62 y.o. male who presents to the emergency department with need for placement. Brought in by squad. Was found on the street with his wheelchair broken. He he signed himself out [employer]. AMA. EMS brought him here as he had no place to go. Nursing Notes were reviewed. Including nursing noted for FM, Surgical History, Past Medical History, Social History, vitals, and allergies; agree with all. REVIEW OF SYSTEMS       Review of Systems   Constitutional: Negative for activity change, appetite change, chills, diaphoresis, fatigue, fever and unexpected weight change. HENT: Negative for congestion, dental problem, drooling, ear discharge and ear pain. Eyes: Negative for photophobia, pain, discharge, redness, itching and visual disturbance. Respiratory: Negative for apnea, cough, choking, chest tightness, shortness of breath, wheezing and stridor. Cardiovascular: Negative for chest pain, palpitations and leg swelling. Gastrointestinal: Negative for abdominal distention, abdominal pain, anal bleeding, blood in stool, constipation, diarrhea, nausea, rectal pain and vomiting. Endocrine: Negative for cold intolerance and heat intolerance. Genitourinary: Negative for decreased urine volume and urgency. Musculoskeletal: Negative for arthralgias and back pain. Skin: Negative for color change and pallor.    Neurological: Negative for dizziness and facial asymmetry. Hematological: Negative for adenopathy. Does not bruise/bleed easily. Psychiatric/Behavioral: Negative for agitation, behavioral problems, confusion and decreased concentration. Except as noted above the remainder of the review of systems was reviewed and negative.      PAST MEDICAL HISTORY     Past Medical History:   Diagnosis Date    Anxiety     Heart disease     Hyperlipidemia     triglycerides    Hypertension     Peripheral vascular disease, unspecified ([facility] [address]     bilateral AKA amputation    Phantom limb (syndrome)     Type II or unspecified type diabetes mellitus without mention of complication, not stated as uncontrolled        SURGICAL HISTORY       Past Surgical History:   Procedure Laterality Date    ABOVE KNEE AMPUTATION  2009,2010    both    BRONCHOSCOPY N/A 9/29/2020    BRONCHOSCOPY performed by Katherine Perry MD at [address] [place] [address]  9/29/2020    BRONCHOSCOPY W/EBUS FNA performed by Katherine Perry MD at [place] [address]    IR PORT PLACEMENT EQUAL OR GREATER THAN 5 YEARS  10/1/2020    IR PORT PLACEMENT EQUAL OR GREATER THAN 5 YEARS 10/1/2020 [facility]ED[facility]    TUNNELED VENOUS PORT PLACEMENT Right 10/01/2020    Power port inserted by Dr. Gavi Farrell       Previous Medications    ALLOPURINOL (ZYLOPRIM) 300 MG TABLET    Take 1 tablet by mouth daily    ASPIRIN 325 MG TABLET    Take 1 tablet by mouth daily    BENZONATATE (TESSALON PERLES) 100 MG CAPSULE    Take 1 capsule by mouth 3 times daily as needed for Cough    CETIRIZINE (ZYRTEC) 10 MG TABLET    Take 1 tablet by mouth daily    CLONIDINE (CATAPRES) 0.3 MG TABLET    Take 1 tablet by mouth 3 times daily    DIPHENHYDRAMINE (BENADRYL) 25 MG TABLET    Take 12.5 mg by mouth every 6 hours as needed for Itching    DULOXETINE (CYMBALTA) 60 MG EXTENDED RELEASE CAPSULE    Take 1 capsule by mouth daily    ESCITALOPRAM (LEXAPRO) 10 MG TABLET    TAKE ONE TABLET BY MOUTH DAILY    HYDRALAZINE (APRESOLINE) 25 MG TABLET    Take 25 mg by mouth 3 times daily    LABETALOL (NORMODYNE) 300 MG TABLET    Take 1 tablet by mouth 2 times daily    LIDOCAINE-GLYCERIN 5-14.4 % CREA    Place rectally every 6 hours as needed (hemorroids)    NICOTINE (NICODERM CQ) 14 MG/24HR    Place 1 patch onto the skin daily    OMEPRAZOLE (PRILOSEC) 20 MG DELAYED RELEASE CAPSULE    Take 1 capsule by mouth Daily    POTASSIUM CHLORIDE (MICRO-K) 10 MEQ EXTENDED RELEASE CAPSULE    Take 10 mEq by mouth daily    ROSUVASTATIN (CRESTOR) 5 MG TABLET    Take 1 tablet by mouth nightly    TIOTROPIUM (SPIRIVA) 18 MCG INHALATION CAPSULE    Inhale 18 mcg into the lungs 2 times daily       ALLERGIES     Niacin and related and Statins [statins]    FAMILY HISTORY        Family History   Problem Relation Age of Onset    Heart Disease Mother     High Cholesterol Mother     High Blood Pressure Mother     Arthritis Mother     Heart Disease Father     Cancer Father         lung    Diabetes Father     High Cholesterol Father     High Blood Pressure Father     Cancer Sister     High Blood Pressure Sister     Diabetes Sister     High Blood Pressure Brother        SOCIAL HISTORY       Social History     Socioeconomic History    Marital status: Single     Spouse name: None    Number of children: None    Years of education: None    Highest education level: None   Occupational History    Occupation: Disabled     Comment: 2007   Social Needs    Financial resource strain: None    Food insecurity     Worry: None     Inability: None    Transportation needs     Medical: None     Non-medical: None   Tobacco Use    Smoking status: Current Every Day Smoker     Packs/day: 2.00     Years: 40.00     Pack years: 80.00     Types: Cigarettes    Smokeless tobacco: Never Used   Substance and Sexual Activity    Alcohol use: Not Currently     Alcohol/week: 1.0 - 2.0 standard drinks     Types: 1 - 2 Shots of liquor per week    Drug use: Yes     Frequency: 1.0 times per week     Types: Marijuana     Comment: 2 x week    Sexual activity: Never   Lifestyle    Physical activity     Days per week: None     Minutes per session: None    Stress: None   Relationships    Social connections     Talks on phone: None     Gets together: None     Attends [employer] service: None     Active member of club or organization: None     Attends meetings of clubs or organizations: None     Relationship status: None    Intimate partner violence     Fear of current or ex partner: None     Emotionally abused: None     Physically abused: None     Forced sexual activity: None   Other Topics Concern    None   Social History Narrative    None       PHYSICAL EXAM       ED Triage Vitals [11/05/20 1945]   BP Temp Temp Source Pulse Resp SpO2 Height Weight   132/82 96.9 °F (36.1 °C) Oral 82 19 96 % -- 183 lb (83 kg)       Physical Exam  Vitals signs and nursing note reviewed. Constitutional:       General: He is not in acute distress. Appearance: He is well-developed. He is not diaphoretic. HENT:      Head: Normocephalic and atraumatic. Eyes:      General:         Right eye: No discharge. Left eye: No discharge. Pupils: Pupils are equal, round, and reactive to light. Neck:      Musculoskeletal: Normal range of motion. Thyroid: No thyromegaly. Trachea: No tracheal deviation. Cardiovascular:      Rate and Rhythm: Normal rate and regular rhythm. Heart sounds: No murmur. Pulmonary:      Breath sounds: No wheezing or rales. Chest:      Chest wall: No tenderness. Abdominal:      General: There is no distension. Palpations: Abdomen is soft. There is no mass. Tenderness: There is no abdominal tenderness. There is no guarding or rebound. Musculoskeletal: Normal range of motion. General: No tenderness or deformity. Skin:     General: Skin is warm. Coloration: Skin is not pale.       Findings: No erythema or rash. Neurological:      Mental Status: He is alert. Cranial Nerves: No cranial nerve deficit. Motor: No abnormal muscle tone. Coordination: Coordination normal.         DIAGNOSTIC RESULTS     EKG: All EKG's are interpreted by the Emergency Department Physician who either signs or Co-signs this chart in the absence of acardiologist.    None    RADIOLOGY:   Non-plain film images such as CT, Ultrasoundand MRI are read by the radiologist. Plain radiographic images are visualized and preliminarily interpreted by the emergency physician with the below findings:    None    ED BEDSIDE ULTRASOUND:   Performed by ED Physician - none    LABS:  Labs Reviewed   CBC WITH AUTO DIFFERENTIAL - Abnormal; Notable for the following components:       Result Value    WBC 3.6 (*)     RBC 3.78 (*)     Hemoglobin 11.2 (*)     Hematocrit 33.1 (*)     RDW 15.9 (*)     Lymphocytes Absolute 0.7 (*)     All other components within normal limits    Narrative:     Performed at:  [facility]  [address], [place] [facility] [facility] [employer]   Phone [phone]   COMPREHENSIVE METABOLIC PANEL W/ REFLEX TO MG FOR LOW K - Abnormal; Notable for the following components:    Sodium 133 (*)     Chloride 95 (*)     Glucose 117 (*)     BUN 4 (*)     CREATININE <0.5 (*)     All other components within normal limits    Narrative:     Performed at:  [facility]  [address], [place] [employer] [address]   Phone [phone]   [place]    Narrative:     Performed at:  [facility]  [address], [place] [facility] [address]   Phone [phone]       All other labs were withinnormal range or not returned as of this dictation.     EMERGENCY DEPARTMENT COURSE and DIFFERENTIAL DIAGNOSIS/MDM:     PMH, Surgical Hx, FH, Social Hx reviewed by myself (ETOH usage, Tobacco usage, Drug usage reviewed by myself, no pertinent Hx)- No Pertinent Hx Old records were reviewed by me    Working on blood pressure control. Admission for placement. Needs a new wheelchair. CRITICAL CARE TIME   Total Critical Caretime was 21 minutes, excluding separately reportable procedures. There was a high probability of clinically significant/life threatening deterioration in the patient's condition which required my urgent intervention. PROCEDURES:  Unlessotherwise noted below, none    FINAL IMPRESSION      1. Hypertension, unspecified type    2.  Inability to perform activities of daily living          DISPOSITION/PLAN   DISPOSITION Decision To Admit 11/06/2020 01:03:28 AM    (Please note that portions ofthis note were completed with a voice recognition program.  Efforts were made to edit the dictations but occasionally words are mis-transcribed.)    Bentley Monique MD(electronically signed)  Attending Emergency Physician          Bentley Monique MD  11/06/20 0113
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
## 2020-11-06 NOTE — ED NOTES
Called [facility] to speak with nurse who took care of patient. Nurse there states pt left and signed out AMA. Informed nurse that patient doesn't recall leaving or signing anything about not coming back. Nurse then states she will have the  call back to me.        Iram Vyas RN  11/05/20 2010

## 2020-11-06 NOTE — PROGRESS NOTES
Patient in hallway in wheelchair asking PT to be wheeled outside. Patient educated on facility policy. Will continue to monitor.
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
## 2020-11-06 NOTE — PROGRESS NOTES
4 Eyes Admission Assessment     I agree as the admission nurse that 2 RN's have performed a thorough Head to Toe Skin Assessment on the patient. ALL assessment sites listed below have been assessed on admission. Areas assessed by both nurses:  [x]   Head, Face, and Ears   [x]   Shoulders, Back, and Chest  [x]   Arms, Elbows, and Hands   [x]   Coccyx, Sacrum, and Ischum  [x]   Legs, Feet, and Heels         Does the Patient have Skin Breakdown?   No         Brayan Prevention initiated:  Yes   Wound Care Orders initiated:  No      [facility] nurse consulted for Pressure Injury (Stage 3,4, Unstageable, DTI, NWPT, and Complex wounds):  No      Nurse 1 eSignature: Electronically signed by Alexander Simon RN on 11/6/20 at 5:34 AM EST    **SHARE this note so that the co-signing nurse is able to place an eSignature**    Nurse 2 eSignature: Electronically signed by Payton Honeycutt RN on 11/6/2020 at 5:35 AM

## 2020-11-06 NOTE — PLAN OF CARE
Problem: Falls - Risk of:  Goal: Will remain free from falls  Description: Will remain free from falls  11/6/2020 0721 by Ndihi Kaye RN  Outcome: Ongoing  Note: Fall risk assessment completed . Fall precautions in place, bed alarm on, side rails 2/4 up, call light in reach, educated pt on calling for assistance when needed, room clear of clutter. Pt verbalized understanding. 11/6/2020 0532 by Janet Stein RN  Note: Pt remains free from falls this shift. Fall precautions in place. Will continue to monitor. Goal: Absence of physical injury  Description: Absence of physical injury  Outcome: Ongoing     Problem: Skin Integrity:  Goal: Will show no infection signs and symptoms  Description: Will show no infection signs and symptoms  11/6/2020 0721 by Nidhi Kaye RN  Outcome: Ongoing  Note: Patient is alert and oriented, afebrile, has manageable complaints of pain, skin is intact and appropriate for ethnicity in color    11/6/2020 0532 by Janet Stein RN  Note: Pt will show no signs of infection this shift. Goal: Absence of new skin breakdown  Description: Absence of new skin breakdown  Outcome: Ongoing  Note: Brayan score assessed. Patient able to ambulate and turn self and repositioned patient Q2H and assessed skin. Educated patient on importance of repositioning to prevent skin issues.

## 2020-11-06 NOTE — PROGRESS NOTES
Checking on patient Q2H for nutrition needs, hygiene needs, comfort measures, mobility, fall risk interventions, and safe environment. All precautions and interventions in place. Educated patient on use of call light and telephone. Patient verbalizes understanding. Call light/telephone in reach.   Electronically signed by Sherif Soto RN on 11/6/2020 at 7:24 AM
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
## 2020-11-06 NOTE — PROGRESS NOTES
Pt arrived to floor from ER at 0315 via stretcher. Pt oriented to room, call light, policies and procedures, the menu and ordering. Call light within reach. Bed in lowest position, bed alarm on, and wheels locked. Pt verbalized understanding. No complaints, questions or concerns at this time.

## 2020-11-06 NOTE — PLAN OF CARE
Problem: Falls - Risk of:  Goal: Will remain free from falls  Description: Will remain free from falls  Note: Pt remains free from falls this shift. Fall precautions in place. Will continue to monitor. Problem: Skin Integrity:  Goal: Will show no infection signs and symptoms  Description: Will show no infection signs and symptoms  Note: Pt will show no signs of infection this shift.

## 2020-11-07 ENCOUNTER — HOSPITAL ENCOUNTER (INPATIENT)
Age: 60
LOS: 1 days | Discharge: HOME | DRG: 145 | End: 2020-11-13
Attending: STUDENT IN AN ORGANIZED HEALTH CARE EDUCATION/TRAINING PROGRAM | Admitting: HOSPITALIST
Payer: MEDICAID

## 2020-11-07 ENCOUNTER — TELEPHONE (OUTPATIENT)
Dept: OTHER | Facility: CLINIC | Age: 60
End: 2020-11-07

## 2020-11-07 VITALS
SYSTOLIC BLOOD PRESSURE: 172 MMHG | HEIGHT: 72 IN | TEMPERATURE: 97.5 F | BODY MASS INDEX: 23.14 KG/M2 | RESPIRATION RATE: 20 BRPM | WEIGHT: 170.86 LBS | OXYGEN SATURATION: 96 % | HEART RATE: 100 BPM | DIASTOLIC BLOOD PRESSURE: 100 MMHG

## 2020-11-07 LAB
A/G RATIO: 1.6 (ref 1.1–2.2)
ALBUMIN SERPL-MCNC: 3.9 G/DL (ref 3.4–5)
ALP BLD-CCNC: 108 U/L (ref 40–129)
ALT SERPL-CCNC: 16 U/L (ref 10–40)
AMPHETAMINE SCREEN, URINE: POSITIVE
ANION GAP SERPL CALCULATED.3IONS-SCNC: 11 MMOL/L (ref 3–16)
AST SERPL-CCNC: 13 U/L (ref 15–37)
BARBITURATE SCREEN URINE: ABNORMAL
BASOPHILS ABSOLUTE: 0.1 K/UL (ref 0–0.2)
BASOPHILS RELATIVE PERCENT: 1.5 %
BENZODIAZEPINE SCREEN, URINE: ABNORMAL
BILIRUB SERPL-MCNC: 0.3 MG/DL (ref 0–1)
BUN BLDV-MCNC: 5 MG/DL (ref 7–20)
CALCIUM SERPL-MCNC: 8.7 MG/DL (ref 8.3–10.6)
CANNABINOID SCREEN URINE: POSITIVE
CHLORIDE BLD-SCNC: 96 MMOL/L (ref 99–110)
CO2: 25 MMOL/L (ref 21–32)
COCAINE METABOLITE SCREEN URINE: ABNORMAL
CREAT SERPL-MCNC: 0.5 MG/DL (ref 0.8–1.3)
EOSINOPHILS ABSOLUTE: 0.1 K/UL (ref 0–0.6)
EOSINOPHILS RELATIVE PERCENT: 1.4 %
GFR AFRICAN AMERICAN: >60
GFR NON-AFRICAN AMERICAN: >60
GLOBULIN: 2.4 G/DL
GLUCOSE BLD-MCNC: 117 MG/DL (ref 70–99)
HCT VFR BLD CALC: 31 % (ref 40.5–52.5)
HEMOGLOBIN: 10.4 G/DL (ref 13.5–17.5)
LYMPHOCYTES ABSOLUTE: 0.8 K/UL (ref 1–5.1)
LYMPHOCYTES RELATIVE PERCENT: 22.1 %
Lab: ABNORMAL
MCH RBC QN AUTO: 29.3 PG (ref 26–34)
MCHC RBC AUTO-ENTMCNC: 33.5 G/DL (ref 31–36)
MCV RBC AUTO: 87.4 FL (ref 80–100)
METHADONE SCREEN, URINE: ABNORMAL
MONOCYTES ABSOLUTE: 0.3 K/UL (ref 0–1.3)
MONOCYTES RELATIVE PERCENT: 8.3 %
NEUTROPHILS ABSOLUTE: 2.4 K/UL (ref 1.7–7.7)
NEUTROPHILS RELATIVE PERCENT: 66.7 %
OPIATE SCREEN URINE: ABNORMAL
OXYCODONE URINE: ABNORMAL
PDW BLD-RTO: 16.2 % (ref 12.4–15.4)
PH UA: 6
PHENCYCLIDINE SCREEN URINE: ABNORMAL
PLATELET # BLD: 326 K/UL (ref 135–450)
PMV BLD AUTO: 6.6 FL (ref 5–10.5)
POTASSIUM REFLEX MAGNESIUM: 3.6 MMOL/L (ref 3.5–5.1)
PROPOXYPHENE SCREEN: ABNORMAL
RBC # BLD: 3.55 M/UL (ref 4.2–5.9)
SODIUM BLD-SCNC: 132 MMOL/L (ref 136–145)
TOTAL PROTEIN: 6.3 G/DL (ref 6.4–8.2)
WBC # BLD: 3.7 K/UL (ref 4–11)

## 2020-11-07 PROCEDURE — 94760 N-INVAS EAR/PLS OXIMETRY 1: CPT

## 2020-11-07 PROCEDURE — 6370000000 HC RX 637 (ALT 250 FOR IP): Performed by: NURSE PRACTITIONER

## 2020-11-07 PROCEDURE — 80307 DRUG TEST PRSMV CHEM ANLYZR: CPT

## 2020-11-07 PROCEDURE — 6370000000 HC RX 637 (ALT 250 FOR IP): Performed by: PHYSICIAN ASSISTANT

## 2020-11-07 PROCEDURE — 85025 COMPLETE CBC W/AUTO DIFF WBC: CPT

## 2020-11-07 PROCEDURE — 94640 AIRWAY INHALATION TREATMENT: CPT

## 2020-11-07 PROCEDURE — G0378 HOSPITAL OBSERVATION PER HR: HCPCS

## 2020-11-07 PROCEDURE — 6370000000 HC RX 637 (ALT 250 FOR IP): Performed by: STUDENT IN AN ORGANIZED HEALTH CARE EDUCATION/TRAINING PROGRAM

## 2020-11-07 PROCEDURE — 2580000003 HC RX 258: Performed by: NURSE PRACTITIONER

## 2020-11-07 PROCEDURE — 99283 EMERGENCY DEPT VISIT LOW MDM: CPT

## 2020-11-07 PROCEDURE — 80053 COMPREHEN METABOLIC PANEL: CPT

## 2020-11-07 PROCEDURE — 93005 ELECTROCARDIOGRAM TRACING: CPT | Performed by: PHYSICIAN ASSISTANT

## 2020-11-07 RX ORDER — PROMETHAZINE HYDROCHLORIDE 25 MG/1
12.5 TABLET ORAL EVERY 6 HOURS PRN
Status: DISCONTINUED | OUTPATIENT
Start: 2020-11-07 | End: 2020-11-13 | Stop reason: HOSPADM

## 2020-11-07 RX ORDER — ASPIRIN 325 MG
325 TABLET ORAL DAILY
Status: DISCONTINUED | OUTPATIENT
Start: 2020-11-08 | End: 2020-11-13 | Stop reason: HOSPADM

## 2020-11-07 RX ORDER — POLYETHYLENE GLYCOL 3350 17 G/17G
17 POWDER, FOR SOLUTION ORAL DAILY PRN
Status: DISCONTINUED | OUTPATIENT
Start: 2020-11-07 | End: 2020-11-13 | Stop reason: HOSPADM

## 2020-11-07 RX ORDER — DULOXETIN HYDROCHLORIDE 60 MG/1
60 CAPSULE, DELAYED RELEASE ORAL DAILY
Status: DISCONTINUED | OUTPATIENT
Start: 2020-11-08 | End: 2020-11-13 | Stop reason: HOSPADM

## 2020-11-07 RX ORDER — CLONIDINE HYDROCHLORIDE 0.1 MG/1
0.3 TABLET ORAL 3 TIMES DAILY
Status: DISCONTINUED | OUTPATIENT
Start: 2020-11-07 | End: 2020-11-13 | Stop reason: HOSPADM

## 2020-11-07 RX ORDER — SODIUM CHLORIDE 0.9 % (FLUSH) 0.9 %
10 SYRINGE (ML) INJECTION EVERY 12 HOURS SCHEDULED
Status: DISCONTINUED | OUTPATIENT
Start: 2020-11-07 | End: 2020-11-13 | Stop reason: HOSPADM

## 2020-11-07 RX ORDER — ACETAMINOPHEN 650 MG/1
650 SUPPOSITORY RECTAL EVERY 6 HOURS PRN
Status: DISCONTINUED | OUTPATIENT
Start: 2020-11-07 | End: 2020-11-13

## 2020-11-07 RX ORDER — ONDANSETRON 2 MG/ML
4 INJECTION INTRAMUSCULAR; INTRAVENOUS EVERY 6 HOURS PRN
Status: DISCONTINUED | OUTPATIENT
Start: 2020-11-07 | End: 2020-11-13 | Stop reason: HOSPADM

## 2020-11-07 RX ORDER — HYDRALAZINE HYDROCHLORIDE 25 MG/1
25 TABLET, FILM COATED ORAL 3 TIMES DAILY
Status: DISCONTINUED | OUTPATIENT
Start: 2020-11-07 | End: 2020-11-13 | Stop reason: HOSPADM

## 2020-11-07 RX ORDER — ROSUVASTATIN CALCIUM 10 MG/1
5 TABLET, COATED ORAL NIGHTLY
Status: DISCONTINUED | OUTPATIENT
Start: 2020-11-07 | End: 2020-11-13 | Stop reason: HOSPADM

## 2020-11-07 RX ORDER — SODIUM CHLORIDE 0.9 % (FLUSH) 0.9 %
10 SYRINGE (ML) INJECTION PRN
Status: DISCONTINUED | OUTPATIENT
Start: 2020-11-07 | End: 2020-11-13 | Stop reason: HOSPADM

## 2020-11-07 RX ORDER — ALLOPURINOL 300 MG/1
300 TABLET ORAL DAILY
Status: DISCONTINUED | OUTPATIENT
Start: 2020-11-08 | End: 2020-11-13 | Stop reason: HOSPADM

## 2020-11-07 RX ORDER — ESCITALOPRAM OXALATE 10 MG/1
10 TABLET ORAL DAILY
Status: DISCONTINUED | OUTPATIENT
Start: 2020-11-08 | End: 2020-11-13 | Stop reason: HOSPADM

## 2020-11-07 RX ORDER — NICOTINE 21 MG/24HR
1 PATCH, TRANSDERMAL 24 HOURS TRANSDERMAL DAILY
Status: DISCONTINUED | OUTPATIENT
Start: 2020-11-08 | End: 2020-11-13 | Stop reason: HOSPADM

## 2020-11-07 RX ORDER — ACETAMINOPHEN 325 MG/1
650 TABLET ORAL EVERY 6 HOURS PRN
Status: DISCONTINUED | OUTPATIENT
Start: 2020-11-07 | End: 2020-11-13

## 2020-11-07 RX ORDER — POTASSIUM CHLORIDE 750 MG/1
10 TABLET, FILM COATED, EXTENDED RELEASE ORAL
Status: DISCONTINUED | OUTPATIENT
Start: 2020-11-08 | End: 2020-11-13 | Stop reason: HOSPADM

## 2020-11-07 RX ORDER — OMEPRAZOLE 20 MG/1
20 CAPSULE, DELAYED RELEASE ORAL DAILY
Status: DISCONTINUED | OUTPATIENT
Start: 2020-11-08 | End: 2020-11-13 | Stop reason: HOSPADM

## 2020-11-07 RX ORDER — NICOTINE 21 MG/24HR
1 PATCH, TRANSDERMAL 24 HOURS TRANSDERMAL DAILY
Status: DISCONTINUED | OUTPATIENT
Start: 2020-11-07 | End: 2020-11-07

## 2020-11-07 RX ADMIN — LABETALOL HYDROCHLORIDE 300 MG: 200 TABLET, FILM COATED ORAL at 08:49

## 2020-11-07 RX ADMIN — ALLOPURINOL 300 MG: 300 TABLET ORAL at 08:49

## 2020-11-07 RX ADMIN — DULOXETINE HYDROCHLORIDE 60 MG: 60 CAPSULE, DELAYED RELEASE ORAL at 08:49

## 2020-11-07 RX ADMIN — CLONIDINE HYDROCHLORIDE 0.3 MG: 0.1 TABLET ORAL at 23:04

## 2020-11-07 RX ADMIN — SALINE NASAL SPRAY 1 SPRAY: 1.5 SOLUTION NASAL at 03:56

## 2020-11-07 RX ADMIN — Medication 10 ML: at 23:08

## 2020-11-07 RX ADMIN — POTASSIUM CHLORIDE 10 MEQ: 750 TABLET, FILM COATED, EXTENDED RELEASE ORAL at 08:49

## 2020-11-07 RX ADMIN — LABETALOL HYDROCHLORIDE 300 MG: 200 TABLET, FILM COATED ORAL at 23:04

## 2020-11-07 RX ADMIN — APIXABAN 5 MG: 5 TABLET, FILM COATED ORAL at 23:04

## 2020-11-07 RX ADMIN — ASPIRIN 325 MG ORAL TABLET 325 MG: 325 PILL ORAL at 08:48

## 2020-11-07 RX ADMIN — APIXABAN 5 MG: 5 TABLET, FILM COATED ORAL at 08:50

## 2020-11-07 RX ADMIN — HYDRALAZINE HYDROCHLORIDE 25 MG: 25 TABLET, FILM COATED ORAL at 08:49

## 2020-11-07 RX ADMIN — CLONIDINE HYDROCHLORIDE 0.3 MG: 0.1 TABLET ORAL at 08:48

## 2020-11-07 RX ADMIN — ROSUVASTATIN CALCIUM 5 MG: 10 TABLET, FILM COATED ORAL at 23:05

## 2020-11-07 RX ADMIN — HYDRALAZINE HYDROCHLORIDE 25 MG: 25 TABLET, FILM COATED ORAL at 23:05

## 2020-11-07 RX ADMIN — TIOTROPIUM BROMIDE INHALATION SPRAY 2 PUFF: 3.12 SPRAY, METERED RESPIRATORY (INHALATION) at 09:54

## 2020-11-07 RX ADMIN — ESCITALOPRAM OXALATE 10 MG: 10 TABLET ORAL at 08:48

## 2020-11-07 ASSESSMENT — ENCOUNTER SYMPTOMS
NAUSEA: 0
SHORTNESS OF BREATH: 1
COUGH: 0
ABDOMINAL PAIN: 0
VOMITING: 0

## 2020-11-07 ASSESSMENT — PAIN SCALES - GENERAL
PAINLEVEL_OUTOF10: 0
PAINLEVEL_OUTOF10: 0
PAINLEVEL_OUTOF10: 6

## 2020-11-07 ASSESSMENT — PAIN DESCRIPTION - LOCATION: LOCATION: CHEST
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
## 2020-11-07 NOTE — PLAN OF CARE
Problem: Skin Integrity:  Goal: Will show no infection signs and symptoms  Description: Will show no infection signs and symptoms  Outcome: Ongoing     Problem: Skin Integrity:  Goal: Absence of new skin breakdown  Description: Absence of new skin breakdown  Outcome: Ongoing

## 2020-11-07 NOTE — TELEPHONE ENCOUNTER
Writer contacted  ED provider to inform of 30 day readmission risk. ED provider, Roxanne informed writer of readmission.
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
## 2020-11-07 NOTE — ED NOTES
.ED SBAR report provider to Jyothi Solis, [address] [place] [address]. Patient to be transported to Room 4124 via stretcher by transport. IV site clean, dry, and intact. MEWS score 1 and pain assessed as 5/10 in back and documented.       [address]  11/06/20 7069
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
## 2020-11-07 NOTE — PLAN OF CARE
Problem: Skin Integrity:  Goal: Will show no infection signs and symptoms  Description: Will show no infection signs and symptoms  11/7/2020 0954 by Butch Dias RN  Outcome: Ongoing  11/7/2020 0333 by Yasmin Tracey RN  Outcome: Ongoing  Goal: Absence of new skin breakdown  Description: Absence of new skin breakdown  11/7/2020 0954 by Butch Dias RN  Outcome: Ongoing  11/7/2020 0333 by Yasmin Tracey RN  Outcome: Ongoing

## 2020-11-07 NOTE — PROGRESS NOTES
Security located patient in the medical office buildings connected to the hospital.  RN supervisor notified and went to patient. This RN removed patient IV access. Nursing supervisor notified patient that he must go through ED if he would like to be readmitted. Patient declined/refused and is currently calling to find a place to stay with family/friends. He currently has hospital w/c.

## 2020-11-07 NOTE — PROGRESS NOTES
Patient alert and oriented x4, VSS, sitting up in bed. Patient denies n/v, diarrhea, pain, states is feeling SOB. O2 WNL on room air. Patient requesting diet order, call out to MD to obtain order. Patient denies further needs at this time. Bed in lowest position. Patient calls appropriately, call light within reach. Will continue to monitor pt needs.

## 2020-11-07 NOTE — CARE COORDINATION
Spoke with [facility] - she will review and call back. [facility] and [facility] have also declined.

## 2020-11-07 NOTE — CARE COORDINATION
LSW rec'd call from Chika Ayers, who reports pt has left the building on his own.   [facility]     Case Management   [phone]    11/7/2020  11:49 AM

## 2020-11-08 PROCEDURE — G0378 HOSPITAL OBSERVATION PER HR: HCPCS

## 2020-11-08 PROCEDURE — 94640 AIRWAY INHALATION TREATMENT: CPT

## 2020-11-08 PROCEDURE — 94760 N-INVAS EAR/PLS OXIMETRY 1: CPT

## 2020-11-08 PROCEDURE — 6370000000 HC RX 637 (ALT 250 FOR IP): Performed by: NURSE PRACTITIONER

## 2020-11-08 PROCEDURE — 2580000003 HC RX 258: Performed by: NURSE PRACTITIONER

## 2020-11-08 RX ADMIN — LABETALOL HYDROCHLORIDE 300 MG: 200 TABLET, FILM COATED ORAL at 09:04

## 2020-11-08 RX ADMIN — Medication 10 ML: at 22:26

## 2020-11-08 RX ADMIN — LABETALOL HYDROCHLORIDE 300 MG: 200 TABLET, FILM COATED ORAL at 22:23

## 2020-11-08 RX ADMIN — CLONIDINE HYDROCHLORIDE 0.3 MG: 0.1 TABLET ORAL at 14:21

## 2020-11-08 RX ADMIN — OMEPRAZOLE 20 MG: 20 CAPSULE, DELAYED RELEASE ORAL at 07:03

## 2020-11-08 RX ADMIN — ALLOPURINOL 300 MG: 300 TABLET ORAL at 09:05

## 2020-11-08 RX ADMIN — ASPIRIN 325 MG ORAL TABLET 325 MG: 325 PILL ORAL at 09:04

## 2020-11-08 RX ADMIN — HYDRALAZINE HYDROCHLORIDE 25 MG: 25 TABLET, FILM COATED ORAL at 14:21

## 2020-11-08 RX ADMIN — ROSUVASTATIN CALCIUM 5 MG: 10 TABLET, FILM COATED ORAL at 22:22

## 2020-11-08 RX ADMIN — CLONIDINE HYDROCHLORIDE 0.3 MG: 0.1 TABLET ORAL at 09:03

## 2020-11-08 RX ADMIN — HYDRALAZINE HYDROCHLORIDE 25 MG: 25 TABLET, FILM COATED ORAL at 09:04

## 2020-11-08 RX ADMIN — CLONIDINE HYDROCHLORIDE 0.3 MG: 0.1 TABLET ORAL at 22:22

## 2020-11-08 RX ADMIN — Medication 10 ML: at 09:09

## 2020-11-08 RX ADMIN — TIOTROPIUM BROMIDE INHALATION SPRAY 2 PUFF: 3.12 SPRAY, METERED RESPIRATORY (INHALATION) at 08:04

## 2020-11-08 RX ADMIN — POTASSIUM CHLORIDE 10 MEQ: 750 TABLET, FILM COATED, EXTENDED RELEASE ORAL at 09:04

## 2020-11-08 RX ADMIN — DULOXETINE HYDROCHLORIDE 60 MG: 60 CAPSULE, DELAYED RELEASE ORAL at 09:08

## 2020-11-08 RX ADMIN — ESCITALOPRAM OXALATE 10 MG: 10 TABLET ORAL at 09:04

## 2020-11-08 RX ADMIN — HYDRALAZINE HYDROCHLORIDE 25 MG: 25 TABLET, FILM COATED ORAL at 22:22

## 2020-11-08 RX ADMIN — APIXABAN 5 MG: 5 TABLET, FILM COATED ORAL at 09:04

## 2020-11-08 RX ADMIN — APIXABAN 5 MG: 5 TABLET, FILM COATED ORAL at 22:22

## 2020-11-08 ASSESSMENT — PAIN SCALES - GENERAL
PAINLEVEL_OUTOF10: 0
PAINLEVEL_OUTOF10: 0
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
## 2020-11-08 NOTE — CARE COORDINATION
TAYLOR Consult: Placement  12pm: met with patient outside in front of the hospital. Discussed his plans since he left the hospital AMA. Patient is hoping his cousin Abhishek Vogel will come and pick him up and he could stay with Abhishek Vogel and his Aunt. SW called Abhishek Vogel for patient on cell phone- speaker on and the aunt told patient that she is praying for him and she will give Abhishek Vogel his message to call him back. TAYLOR spoke with Dr. Valentino Dakins regarding patient- asked if patient could return to room and he advised that since patient has been sitting outside he will need to have a drug screen done in the ED. 3pm  went outside and spoke with patient regarding his cousin coming, patient reported that his cousin will not be able to help him. Patient requested to come back into the hospital and be admitted and get help to locate a new placement. SW advised patient that he needs to be pleasant with our staff and agree not to leave again. Discussed with patient a nicotine  Patch to assist him with needing to smoke. Assisted patient in going to ED for readmission. Patient will be a difficult placement due to his documented behavior at [place] [address]. [employer].   María Pitts LSW  [phone]

## 2020-11-08 NOTE — PROGRESS NOTES
Pt called out saying he can't breathe, went to assess the pt and he was at 95-96% on the O2 pulse ox. I instructed the pt to take slow deep breaths in through is nose and out of his mouth. I asked the pt what he was doing when that happened and he stated he was sleeping. I gave the pt 2L of O2 for comfort. He stated he feels better. Pt reports he has no hx of sleep apnea.

## 2020-11-08 NOTE — PLAN OF CARE
Problem: Safety:  Goal: Free from accidental physical injury  Description: Free from accidental physical injury  11/8/2020 1052 by Sejal Ang RN  Outcome: Ongoing  11/8/2020 0416 by Genesis Douglas RN  Outcome: Ongoing     Problem: Daily Care:  Goal: Daily care needs are met  Description: Daily care needs are met  11/8/2020 1052 by Sejal Ang RN  Outcome: Ongoing  11/8/2020 0416 by Genesis Douglas RN  Outcome: Ongoing

## 2020-11-08 NOTE — PROGRESS NOTES
Essential hypertension    Phantom limb syndrome with pain (HCC)    Tobacco abuse    History of substance abuse [address] [address]    Chronic pain syndrome    Lumbosacral spondylosis without myelopathy    Superior vena cava compression syndrome    Mediastinal mass    Pulmonary embolus (HCC)    Lung cancer (HCC)    Atypical pneumonia    Pulmonary infiltrate    Centrilobular emphysema (HCC)    SOB (shortness of breath)    Hypertension    Weakness       Allergies  Niacin and related and Statins [statins]    Medications    Scheduled Meds:   allopurinol  300 mg Oral Daily    apixaban  5 mg Oral BID    aspirin  325 mg Oral Daily    cloNIDine  0.3 mg Oral TID    DULoxetine  60 mg Oral Daily    escitalopram  10 mg Oral Daily    hydrALAZINE  25 mg Oral TID    labetalol  300 mg Oral BID    nicotine  1 patch Transdermal Daily    omeprazole  20 mg Oral Daily    potassium chloride  10 mEq Oral Daily with breakfast    rosuvastatin  5 mg Oral Nightly    sodium chloride flush  10 mL Intravenous 2 times per day    tiotropium  2 puff Inhalation Daily     Continuous Infusions:  PRN Meds:  sodium chloride flush, acetaminophen **OR** acetaminophen, polyethylene glycol, promethazine **OR** ondansetron    Vitals   Vitals /wt   Patient Vitals for the past 8 hrs:   BP Temp Temp src Pulse Resp SpO2 Weight   11/08/20 0903 (!) 171/78 -- -- 105 -- -- --   11/08/20 0815 -- -- -- 105 -- -- --   11/08/20 0808 -- -- -- -- 18 98 % --   11/08/20 0530 (!) 160/84 -- -- -- -- -- --   11/08/20 0518 (!) 229/98 97.5 °F (36.4 °C) Oral 99 18 95 % 179 lb 3.7 oz (81.3 kg)        72HR INTAKE/OUTPUT:      Intake/Output Summary (Last 24 hours) at 11/8/2020 0946  Last data filed at 11/7/2020 2233  Gross per 24 hour   Intake 300 ml   Output --   Net 300 ml       Exam:    Gen:   Alert and oriented ×3  Eyes: PERRL. No sclera icterus. No conjunctival injection. ENT: No discharge. Pharynx clear.  External appearance of ears and nose normal.  Neck: Trachea midline. No obvious mass. Resp: No accessory muscle use. No crackles. No wheezes. No rhonchi. CV: Regular rate. Regular rhythm. No murmur or rub. No edema. GI: Non-tender. Non-distended. No hernia. Skin: Warm, dry, normal texture and turgor. Lymph: No cervical LAD. No supraclavicular LAD. M/S: / Ext. No cyanosis. No clubbing. No joint deformity. Neuro: CN 2-12 are intact,  no neurologic deficits noted. PT/INR: No results for input(s): PROTIME, INR in the last 72 hours. APTT: No results for input(s): APTT in the last 72 hours. CBC:   Recent Labs     11/05/20 2058 11/06/20  1355 11/07/20  1843   WBC 3.6* 4.3 3.7*   HGB 11.2* 10.9* 10.4*   HCT 33.1* 32.2* 31.0*   MCV 87.6 87.2 87.4    383 326       BMP:   Recent Labs     11/05/20 2058 11/06/20  1355 11/07/20  1843   * 131* 132*   K 3.5 3.7 3.6   CL 95* 95* 96*   CO2 24 23 25   BUN 4* 6* 5*   CREATININE <0.5* <0.5* 0.5*       LIVER PROFILE:   Recent Labs     11/05/20 2058 11/06/20  1355 11/07/20  1843   ALKPHOS 115 108 108   AST 22 16 13*   ALT 20 17 16   BILITOT <0.2 0.3 0.3     No results for input(s): AMYLASE in the last 72 hours. No results for input(s): LIPASE in the last 72 hours. UA:No results for input(s): NITRITE, LABCAST, WBCUA, RBCUA, MUCUS in the last 72 hours. TROPONIN:   Recent Labs     11/06/20 1355   TROPONINI <0.01       Lab Results   Component Value Date/Time    URRFLXCULT Not Indicated 02/29/2020 11:03 PM       No results for input(s): TSHREFLEX in the last 72 hours. No components found for: FQI3621  POC GLUCOSE:  No results for input(s): POCGLU in the last 72 hours. No results for input(s): LABA1C in the last 72 hours.    Lab Results   Component Value Date    LABA1C 6.3 02/13/2020         ASSESSMENT AND PLAN         Small cell lung carcinoma:   Heme-onc managing chemo on an outpatient basis      Leukocytopenia anemia  Due to chemotherapy    Tobacco abuse Z72.0  Nicotine patch and counseling       Urine drug screen positive:   Amphetamine and cannabis      Hypertension:   Continue hypertension regimen      Homelessness:   consult social service: Social service consult notes indicate that there have been multiple applications placed to several different facilities to see if they will accept the patient.     All meds continued        Code Status: Full Code        Dispo -social work for placement        The patient and / or the family were informed of the results of any tests, a time was given to answer questions, a plan was proposed and they agreed with plan. Esther Trejo MD    This note was transcribed using [id number] [employer]. Please disregard any translational errors.

## 2020-11-08 NOTE — PROGRESS NOTES
4 Eyes Skin Assessment     NAME:  Leigh De La Rosa  YOB: 1960  MEDICAL RECORD NUMBER:  [id number]    The patient is being assess for  Admission    I agree that 2 RN's have performed a thorough Head to Toe Skin Assessment on the patient. ALL assessment sites listed below have been assessed. Areas assessed by both nurses:    Head, Face, Ears, Shoulders, Back, Chest, Arms, Elbows, Hands, Sacrum. Buttock, Coccyx, Ischium and Legs. Feet and Heels         Does the Patient have a Wound?  No noted wound(s)       Brayan Prevention initiated:  NA  Wound Care Orders initiated:  No    Pressure Injury (Stage 3,4, Unstageable, DTI, NWPT, and Complex wounds) if present place consult order under [de-identified] No    New and Established Ostomies if present place consult order under : No      Nurse 1 eSignature: Electronically signed by Michael Washburn RN on 11/8/20 at 3:55 AM EST    **SHARE this note so that the co-signing nurse is able to place an eSignature**    Nurse 2 eSignature: Electronically signed by Reina Nation RN on 11/8/20 at 4:44 AM EST

## 2020-11-08 NOTE — PROGRESS NOTES
Pt admitted to room 4251 from ED. Pt alert and oriented x 4. BP slightly elevated other VSS WDL. Pt denies any pain at this time. Fall risks screening completed. Bed is on the lowest position; bed wheels are locked, side rails up x2 and bed alarm on. Orientation to room performed and instructions were provided for call light system. Call light and bedside table within pt reach. Will continue to monitor.

## 2020-11-08 NOTE — ED NOTES
Attempted to call report; RN unavailable. RN will return my call.       Rolando Reveles RN  11/07/20 7305

## 2020-11-09 LAB
ANION GAP SERPL CALCULATED.3IONS-SCNC: 12 MMOL/L (ref 3–16)
BUN BLDV-MCNC: 6 MG/DL (ref 7–20)
CALCIUM SERPL-MCNC: 8.8 MG/DL (ref 8.3–10.6)
CHLORIDE BLD-SCNC: 97 MMOL/L (ref 99–110)
CO2: 23 MMOL/L (ref 21–32)
CREAT SERPL-MCNC: <0.5 MG/DL (ref 0.8–1.3)
EKG ATRIAL RATE: 86 BPM
EKG DIAGNOSIS: NORMAL
EKG P AXIS: 30 DEGREES
EKG P-R INTERVAL: 156 MS
EKG Q-T INTERVAL: 400 MS
EKG QRS DURATION: 100 MS
EKG QTC CALCULATION (BAZETT): 478 MS
EKG R AXIS: 7 DEGREES
EKG T AXIS: 25 DEGREES
EKG VENTRICULAR RATE: 86 BPM
GFR AFRICAN AMERICAN: >60
GFR NON-AFRICAN AMERICAN: >60
GLUCOSE BLD-MCNC: 138 MG/DL (ref 70–99)
HCT VFR BLD CALC: 31.6 % (ref 40.5–52.5)
HEMOGLOBIN: 10.4 G/DL (ref 13.5–17.5)
MCH RBC QN AUTO: 28.9 PG (ref 26–34)
MCHC RBC AUTO-ENTMCNC: 33.1 G/DL (ref 31–36)
MCV RBC AUTO: 87.4 FL (ref 80–100)
PDW BLD-RTO: 16.2 % (ref 12.4–15.4)
PLATELET # BLD: 302 K/UL (ref 135–450)
PMV BLD AUTO: 6.6 FL (ref 5–10.5)
POTASSIUM SERPL-SCNC: 4.1 MMOL/L (ref 3.5–5.1)
RBC # BLD: 3.61 M/UL (ref 4.2–5.9)
SODIUM BLD-SCNC: 132 MMOL/L (ref 136–145)
WBC # BLD: 3.8 K/UL (ref 4–11)

## 2020-11-09 PROCEDURE — 2580000003 HC RX 258: Performed by: NURSE PRACTITIONER

## 2020-11-09 PROCEDURE — 6370000000 HC RX 637 (ALT 250 FOR IP): Performed by: HOSPITALIST

## 2020-11-09 PROCEDURE — 85027 COMPLETE CBC AUTOMATED: CPT

## 2020-11-09 PROCEDURE — 94640 AIRWAY INHALATION TREATMENT: CPT

## 2020-11-09 PROCEDURE — 80048 BASIC METABOLIC PNL TOTAL CA: CPT

## 2020-11-09 PROCEDURE — 1200000000 HC SEMI PRIVATE

## 2020-11-09 PROCEDURE — 6370000000 HC RX 637 (ALT 250 FOR IP): Performed by: NURSE PRACTITIONER

## 2020-11-09 PROCEDURE — 36415 COLL VENOUS BLD VENIPUNCTURE: CPT

## 2020-11-09 PROCEDURE — G0378 HOSPITAL OBSERVATION PER HR: HCPCS

## 2020-11-09 PROCEDURE — 93010 ELECTROCARDIOGRAM REPORT: CPT | Performed by: INTERNAL MEDICINE

## 2020-11-09 PROCEDURE — 2700000000 HC OXYGEN THERAPY PER DAY

## 2020-11-09 PROCEDURE — 94761 N-INVAS EAR/PLS OXIMETRY MLT: CPT

## 2020-11-09 RX ORDER — IPRATROPIUM BROMIDE AND ALBUTEROL SULFATE 2.5; .5 MG/3ML; MG/3ML
1 SOLUTION RESPIRATORY (INHALATION)
Status: DISCONTINUED | OUTPATIENT
Start: 2020-11-09 | End: 2020-11-13 | Stop reason: HOSPADM

## 2020-11-09 RX ORDER — PSEUDOEPHEDRINE HCL 60 MG/1
60 TABLET ORAL EVERY 4 HOURS PRN
Status: DISCONTINUED | OUTPATIENT
Start: 2020-11-09 | End: 2020-11-13 | Stop reason: HOSPADM

## 2020-11-09 RX ADMIN — APIXABAN 5 MG: 5 TABLET, FILM COATED ORAL at 20:30

## 2020-11-09 RX ADMIN — CLONIDINE HYDROCHLORIDE 0.3 MG: 0.1 TABLET ORAL at 08:08

## 2020-11-09 RX ADMIN — HYDRALAZINE HYDROCHLORIDE 25 MG: 25 TABLET, FILM COATED ORAL at 20:29

## 2020-11-09 RX ADMIN — LABETALOL HYDROCHLORIDE 300 MG: 200 TABLET, FILM COATED ORAL at 20:29

## 2020-11-09 RX ADMIN — OMEPRAZOLE 20 MG: 20 CAPSULE, DELAYED RELEASE ORAL at 06:44

## 2020-11-09 RX ADMIN — IPRATROPIUM BROMIDE AND ALBUTEROL SULFATE 1 AMPULE: .5; 3 SOLUTION RESPIRATORY (INHALATION) at 19:48

## 2020-11-09 RX ADMIN — ALLOPURINOL 300 MG: 300 TABLET ORAL at 08:08

## 2020-11-09 RX ADMIN — POTASSIUM CHLORIDE 10 MEQ: 750 TABLET, FILM COATED, EXTENDED RELEASE ORAL at 08:08

## 2020-11-09 RX ADMIN — ESCITALOPRAM OXALATE 10 MG: 10 TABLET ORAL at 08:08

## 2020-11-09 RX ADMIN — CLONIDINE HYDROCHLORIDE 0.3 MG: 0.1 TABLET ORAL at 15:01

## 2020-11-09 RX ADMIN — PSEUDOEPHEDRINE HYDROCHLORIDE 60 MG: 60 TABLET ORAL at 12:10

## 2020-11-09 RX ADMIN — DULOXETINE HYDROCHLORIDE 60 MG: 60 CAPSULE, DELAYED RELEASE ORAL at 08:08

## 2020-11-09 RX ADMIN — APIXABAN 5 MG: 5 TABLET, FILM COATED ORAL at 08:07

## 2020-11-09 RX ADMIN — HYDRALAZINE HYDROCHLORIDE 25 MG: 25 TABLET, FILM COATED ORAL at 15:02

## 2020-11-09 RX ADMIN — ASPIRIN 325 MG ORAL TABLET 325 MG: 325 PILL ORAL at 08:08

## 2020-11-09 RX ADMIN — PSEUDOEPHEDRINE HYDROCHLORIDE 60 MG: 60 TABLET ORAL at 20:29

## 2020-11-09 RX ADMIN — ROSUVASTATIN CALCIUM 5 MG: 10 TABLET, FILM COATED ORAL at 20:29

## 2020-11-09 RX ADMIN — CLONIDINE HYDROCHLORIDE 0.3 MG: 0.1 TABLET ORAL at 20:29

## 2020-11-09 RX ADMIN — Medication 10 ML: at 08:09

## 2020-11-09 RX ADMIN — LABETALOL HYDROCHLORIDE 300 MG: 200 TABLET, FILM COATED ORAL at 08:08

## 2020-11-09 RX ADMIN — HYDRALAZINE HYDROCHLORIDE 25 MG: 25 TABLET, FILM COATED ORAL at 08:08

## 2020-11-09 RX ADMIN — IPRATROPIUM BROMIDE AND ALBUTEROL SULFATE 1 AMPULE: .5; 3 SOLUTION RESPIRATORY (INHALATION) at 16:29

## 2020-11-09 ASSESSMENT — PAIN SCALES - GENERAL
PAINLEVEL_OUTOF10: 6
PAINLEVEL_OUTOF10: 0

## 2020-11-09 NOTE — PLAN OF CARE
Problem: Infection:  Goal: Will remain free from infection  Description: Will remain free from infection  11/9/2020 1225 by Jessica Crawford RN  Outcome: Ongoing     Problem: Safety:  Goal: Free from accidental physical injury  Description: Free from accidental physical injury  11/9/2020 1225 by Jessica Crawford RN  Outcome: Ongoing     Problem: Daily Care:  Goal: Daily care needs are met  Description: Daily care needs are met  11/9/2020 1225 by Jessica Crawford RN  Outcome: Ongoing     Problem: Skin Integrity:  Goal: Skin integrity will stabilize  Description: Skin integrity will stabilize  11/9/2020 1225 by Jessica Crawford RN  Outcome: Ongoing     Problem: Discharge Planning:  Goal: Patients continuum of care needs are met  Description: Patients continuum of care needs are met  11/9/2020 1225 by Jessica Crawford RN  Outcome: Ongoing

## 2020-11-09 NOTE — CARE COORDINATION
TAYLOR called [facility] [phone], routed to [employer] and left message, requesting a call back. 3:30pm Received call back from [employer]Anton Hernandez( [phone]. She advised since the patient left [facility], [facility] [place]. Magdiel Dupree is not required to take him back. Ms. Ivckinegin Hernandez will call patient directly and if he gives her permission she will call [facility] [place].  on his behalf.

## 2020-11-09 NOTE — PROGRESS NOTES
[facility]    Consult Note      Requesting Physician: Monet Flores  CHIEF COMPLAINT: Sob      HISTORY OF PRESENT ILLNESS:      Mr. Brent Crawford  is a 62 y.o. male we are seeing in consultation for known small cell on chemo sp 2 cycles -cycle 3 due mon  He has had svc from this   He is starting radiation as well on Monday   PT has mostly been in and out of hospital and nh due to issues related to anger bursts/causing damage to his care facility  And has left ama a few times  HE has had bursts of anger  He had amphetimine pos on tox screen -      Past Medical History:        Diagnosis Date    Anxiety     Heart disease     Hyperlipidemia     triglycerides    Hypertension     Peripheral vascular disease, unspecified ([facility] [address]     bilateral AKA amputation    Phantom limb (syndrome)     Type II or unspecified type diabetes mellitus without mention of complication, not stated as uncontrolled      Past Surgical History:        Procedure Laterality Date    ABOVE KNEE AMPUTATION  2009,2010    both    BRONCHOSCOPY N/A 9/29/2020    BRONCHOSCOPY performed by Matt Tobin MD at [address] [facility]  9/29/2020    BRONCHOSCOPY W/EBUS FNA performed by Matt Tobin MD at [address] [place] [address]  4-14    IR PORT PLACEMENT EQUAL OR GREATER THAN 5 YEARS  10/1/2020    IR PORT PLACEMENT EQUAL OR GREATER THAN 5 YEARS 10/1/2020 [facility] PROCED[facility]    TUNNELED VENOUS PORT PLACEMENT Right 10/01/2020    Power port inserted by Dr. Dmitri Estevez       Current Medications:    Current Facility-Administered Medications: pseudoephedrine (SUDAFED) tablet 60 mg, 60 mg, Oral, Q4H PRN  ipratropium-albuterol (DUONEB) nebulizer solution 1 ampule, 1 ampule, Inhalation, Q4H WA  pseudoephedrine (SUDAFED) tablet 60 mg, 60 mg, Oral, Q4H PRN  allopurinol (ZYLOPRIM) tablet 300 mg, 300 mg, Oral, Daily  apixaban (ELIQUIS) tablet 5 mg, 5 mg, Oral, BID  aspirin tablet 325 mg, 325 mg, Oral, Daily  cloNIDine (CATAPRES) tablet 0.3 mg, 0.3 mg, Oral, TID  DULoxetine (CYMBALTA) extended release capsule 60 mg, 60 mg, Oral, Daily  escitalopram (LEXAPRO) tablet 10 mg, 10 mg, Oral, Daily  hydrALAZINE (APRESOLINE) tablet 25 mg, 25 mg, Oral, TID  labetalol (NORMODYNE) tablet 300 mg, 300 mg, Oral, BID  nicotine (NICODERM CQ) 14 MG/24HR 1 patch, 1 patch, Transdermal, Daily  omeprazole (PRILOSEC) delayed release capsule 20 mg, 20 mg, Oral, Daily  potassium chloride (KLOR-CON) extended release tablet 10 mEq, 10 mEq, Oral, Daily with breakfast  rosuvastatin (CRESTOR) tablet 5 mg, 5 mg, Oral, Nightly  sodium chloride flush 0.9 % injection 10 mL, 10 mL, Intravenous, 2 times per day  sodium chloride flush 0.9 % injection 10 mL, 10 mL, Intravenous, PRN  acetaminophen (TYLENOL) tablet 650 mg, 650 mg, Oral, Q6H PRN **OR** acetaminophen (TYLENOL) suppository 650 mg, 650 mg, Rectal, Q6H PRN  polyethylene glycol (GLYCOLAX) packet 17 g, 17 g, Oral, Daily PRN  promethazine (PHENERGAN) tablet 12.5 mg, 12.5 mg, Oral, Q6H PRN **OR** ondansetron (ZOFRAN) injection 4 mg, 4 mg, Intravenous, Q6H PRN  tiotropium (SPIRIVA RESPIMAT) 2.5 MCG/ACT inhaler 2 puff, 2 puff, Inhalation, Daily  Allergies:  Niacin and related and Statins [statins]    Social History:      Social History     Socioeconomic History    Marital status: Single     Spouse name: Not on file    Number of children: Not on file    Years of education: Not on file    Highest education level: Not on file   Occupational History    Occupation: Disabled     Comment: 2007   Social Needs    Financial resource strain: Not on file    Food insecurity     Worry: Not on file     Inability: Not on file   [employer] needs     Medical: Not on file     Non-medical: Not on file   Tobacco Use    Smoking status: Current Every Day Smoker     Packs/day: 2.00     Years: 40.00     Pack years: 80.00     Types: Cigarettes    Smokeless tobacco: Never Used   Substance and Sexual Activity normal  HEMATOLOGIC/LYMPHATICS:  no cervical lymphadenopathy, no supraclavicular lymphadenopathy, no axillary lymphadenopathy and no inguinal lymphadenopathy  LUNGS:  No increased work of breathing, good air exchange, clear to auscultation bilaterally, no crackles or wheezing  CARDIOVASCULAR:  , regular rate and rhythm, normal S1 and S2, no S3 or S4, and no murmur noted  ABDOMEN:  No scars, normal bowel sounds, soft, non-distended, non-tender, no masses palpated, no hepatosplenomegally        DATA:    PT/INR:    Recent Labs     11/07/20  1843   PROT 6.3*     PTT:  No results for input(s): APTT in the last 72 hours. CMP:    Lab Results   Component Value Date     11/09/2020    K 4.1 11/09/2020    K 3.6 11/07/2020    CL 97 11/09/2020    CO2 23 11/09/2020    BUN 6 11/09/2020    PROT 6.3 11/07/2020     Magnesium:    Lab Results   Component Value Date    MG 2.20 11/05/2020     Phosphorus:  No components found for: PO4  Calcium:  No components found for: CA  CBC:    Lab Results   Component Value Date    WBC 3.8 11/09/2020    RBC 3.61 11/09/2020    HGB 10.4 11/09/2020    HCT 31.6 11/09/2020    MCV 87.4 11/09/2020    RDW 16.2 11/09/2020     11/09/2020     DIFF:    Lab Results   Component Value Date    MCV 87.4 11/09/2020    RDW 16.2 11/09/2020      LDH:  @labcrnt(LDH)@  Uric Acid:  @labcrnt(URIC)@    Radiology Review: Xr Chest (2 Vw)    Result Date: 11/6/2020  EXAMINATION: TWO XRAY VIEWS OF THE CHEST 11/6/2020 1:43 pm COMPARISON: October 22, 2020 HISTORY: ORDERING SYSTEM PROVIDED HISTORY: Shortness of breath TECHNOLOGIST PROVIDED HISTORY: Reason for exam:->Shortness of breath Reason for Exam: Shortness of breath Acuity: Acute Type of Exam: Initial Acute shortness of breath. FINDINGS: The cardiomediastinal silhouette is stable in size and contour. Large right hilar/mediastinal mass is again noted. There is a right IJ central venous catheter which extends to the lower SVC. No new infiltrate identified.   Stable the administration of intravenous contrast.  Multiplanar reformatted images are provided for review. MIP images are provided for review. Dose modulation, iterative reconstruction, and/or weight based adjustment of the mA/kV was utilized to reduce the radiation dose to as low as reasonably achievable. COMPARISON: Chest radiograph today. Chest CT 10/22/2020. HISTORY: ORDERING SYSTEM PROVIDED HISTORY: SOB, SCLC, recent PE TECHNOLOGIST PROVIDED HISTORY: Reason for exam:->SOB, SCLC, recent PE Reason for Exam: SOB, SCLC, recent PE Acuity: Acute Type of Exam: Initial FINDINGS: Pulmonary Arteries: Pulmonary arteries are adequately opacified for evaluation. Previously noted subsegmental embolism in the basal right lower lobe is not redemonstrated. .  No new pulmonary embolism. The right upper lobe segmental branches are 10 UA did by the infiltrative right hilar mass. Main pulmonary artery is normal in caliber. Mediastinum: Infiltrative right hilar mass again demonstrated with compressive affects on the right main pulmonary artery. This mass extends into the precarinal space and surrounds the SVC. Again this measures approximately 9 x 7 cm. No pericardial effusion. Scattered additional small mediastinal lymph nodes are again noted. The aorta is normal in caliber. Calcified atheromatous plaque and coronary calcifications are noted. Lungs/pleura: Peripheral ground-glass opacities in the lateral right upper lobe appear new. Patchy ground-glass opacities in the right lung apex appear more prominent. Peribronchial wall thickening in the right upper lobe is unchanged. Small right pleural effusion has developed. Minimal dependent atelectasis in the left lung base. Upper Abdomen: No acute findings. Soft Tissues/Bones: No acute bone or soft tissue abnormality. 1.  Recently demonstrated is subsegmental embolism in the right lower lobe is not identified. No new pulmonary embolism.  2.  Redemonstration of infiltrative right hilar mass encasing the right pulmonary artery and SVC. 3.  New peripheral ground-glass opacities in the right upper lobe and more prominent peripheral ground-glass opacities in the right lung apex. An underlying inflammatory process should be considered in the appropriate clinical setting. 4.  New small right pleural effusion. Ct Chest Pulmonary Embolism W Contrast    Result Date: 10/22/2020  EXAMINATION: CTA OF THE CHEST 10/22/2020 3:33 am TECHNIQUE: CTA of the chest was performed after the administration of intravenous contrast.  Multiplanar reformatted images are provided for review. MIP images are provided for review. Dose modulation, iterative reconstruction, and/or weight based adjustment of the mA/kV was utilized to reduce the radiation dose to as low as reasonably achievable. COMPARISON: CT abdomen pelvis 09/29/2020; CT chest 09/28/2020 HISTORY: ORDERING SYSTEM PROVIDED HISTORY: History of lung cancer short of breath TECHNOLOGIST PROVIDED HISTORY: Reason for exam:->History of lung cancer short of breath Reason for Exam: History of lung cancer short of breath Acuity: Acute Type of Exam: Initial FINDINGS: Pulmonary Arteries: Pulmonary arteries are adequately opacified for evaluation. Focal filling defect within the right lower lobe posterior basal subsegmental pulmonary artery. Main pulmonary artery is normal in caliber. In stranding compression of right hilar bronchovascular structures and obliteration of the right upper lobe pulmonary artery. Mediastinum: Increased size of infiltrative mediastinal/right hilar mass measuring 8.9 x 7.5 cm in axial plane and 8.4 cm craniocaudal previously this measured 8.2 x 6.7 x 8.0 cm. Severe narrowing of the downstream SVC with resultant mediastinal and left chest collaterals and filling of the azygos system. The azygos-SVC confluence is obliterated.   Obliteration of the right upper lobe lobar pulmonary artery, superior right pulmonary vein and mass effect and right upper lobe bronchial structures. Right hilar and mediastinal lymphadenopathy. Normal caliber thoracic aorta. Multivessel coronary calcifications. No pericardial effusion. Lungs/pleura: Small right pleural effusion. Ground-glass abnormality surrounds the right hilar/mediastinal mass. Bronchial walls are thickened in this distribution and there is interlobular septal thickening. Seguin nodule in the subpleural left lower lobe is most suspicious for benign parenchymal lymph node. Mild centrilobular emphysema. Patchy nodular opacities in the right upper lobe. Upper Abdomen: Limited images of the upper abdomen are unremarkable for any acute findings. Nodular appearance of the left adrenal gland is unchanged, obscured by motion on today's exam. Soft Tissues/Bones: Colonic interposition with gas-filled transverse colon. No suspicious osteolytic or osteoblastic lesion. Subsegmental acute pulmonary embolism in the right lower lobe posterior basal branch. Interval increase in size of infiltrative right hilar/mediastinal mass with extrinsic compression/invasion of bronchovascular structures, as described. Metastatic right hilar and mediastinal lymph nodes. Ground-glass halo has developed in the right upper lobe along the distribution of mass, either representing lymphatic/venous congestion, locoregional disease spread or postobstructive infection. More nodular opacities in the right upper lobe are suspicious for the latter as well. Critical results were called by Dr. Kurtis Chow to [facility] on 10/22/2020 at 04:07. Mri Brain W Wo Contrast    Result Date: 10/22/2020  EXAMINATION: MRI OF THE BRAIN WITHOUT AND WITH CONTRAST  10/22/2020 9:39 am TECHNIQUE: Multiplanar multisequence MRI of the head/brain was performed without and with the administration of intravenous contrast. COMPARISON: None.  HISTORY: ORDERING SYSTEM PROVIDED HISTORY: small cell eval brain mets TECHNOLOGIST PROVIDED HISTORY: Reason for exam:->small cell eval brain mets Reason for Exam: Small Cell cancer, Eval for mets Acuity: Unknown Type of Exam: Initial Relevant Medical/Surgical History: Lung cancer FINDINGS: Motion degrades images limiting evaluation. INTRACRANIAL STRUCTURES/VENTRICLES:  There is no acute infarct. No mass effect or midline shift. No evidence of an acute intracranial hemorrhage. Areas of T2 FLAIR hyperintensity are seen in the periventricular and subcortical white matter, which are nonspecific, but may represent chronic microvascular ischemic change. There is prominence of the ventricles and sulci due to global parenchymal volume loss. The sellar/suprasellar regions appear unremarkable. The normal signal voids within the major intracranial vessels appear maintained. No abnormal focus of enhancement is seen within the brain. ORBITS: The visualized portion of the orbits demonstrate no acute abnormality. SINUSES: Minimal scattered mucosal thickening of the paranasal sinuses. Bilateral mastoid effusions, left greater than right. BONES/SOFT TISSUES: The bone marrow signal intensity is not well evaluated due to motion the soft tissues demonstrate no acute abnormality. 1. Patient motion degrades images limiting evaluation. 2. No convincing acute intracranial abnormality. No acute infarct. 3. No gross evidence to suggest intracranial metastatic disease. 4. Mild global parenchymal volume loss with mild chronic microvascular ischemic changes. 5. Bilateral mastoid effusions, left greater than right.          Problem List  Patient Active Problem List   Diagnosis    Type 2 diabetes mellitus with circulatory disorder ([address]    Mixed hyperlipidemia    Essential hypertension    Phantom limb syndrome with pain (HCC)    Tobacco abuse    History of substance abuse ([address]    Chronic pain syndrome    Lumbosacral spondylosis without myelopathy    Superior vena cava compression syndrome    Mediastinal mass    Pulmonary embolus (HCC)    Lung cancer (HCC)    Atypical pneumonia    Pulmonary infiltrate    Centrilobular emphysema (HCC)    SOB (shortness of breath)    Hypertension    Weakness       IMPRESSION/RECOMMENDATIONS:  Small cell lung ca  He has had 2 cycles of chemo   He is due cycle 3 11/16  He is also due to start radiation as well   Thus upon dc we have to have a place that will transport him for chemo and radiation -  I have nothing to do inpt currently   If his counts are mildly low its chemo induced       pe-continue anticoagulation

## 2020-11-09 NOTE — PROGRESS NOTES
Hospitalist Progress Note  11/9/2020 9:18 AM    PCP: Ursula Ellis MD    [phone]     Date of Admission: 11/7/2020                                                                                                                     HOSPITAL COURSE    Patient demographics:  The patient  Stefanie Cabral is a 62 y.o. male     Significant past medical history:   Patient Active Problem List   Diagnosis    Type 2 diabetes mellitus with circulatory disorder ([facility])    Mixed hyperlipidemia    Essential hypertension    Phantom limb syndrome with pain ([facility])    Tobacco abuse    History of substance abuse ([facility])    Chronic pain syndrome    Lumbosacral spondylosis without myelopathy    Superior vena cava compression syndrome    Mediastinal mass    Pulmonary embolus ([facility])    Lung cancer ([facility])    Atypical pneumonia    Pulmonary infiltrate    Centrilobular emphysema (HCC)    SOB (shortness of breath)    Hypertension    Weakness         Presenting symptoms:  SOB, lung cancer, homeless    Diagnostic workup:  CT CHEST PULMONARY EMBOLISM W CONTRAST         CONSULTS DURING ADMISSION :   IP CONSULT TO SOCIAL WORK  IP CONSULT TO SOCIAL WORK      Patient was diagnosed with:  Hypertension  Small cell lung carcinoma:     Treatment while inpatient:  62years old male with medical history significant for pulmonary embolism, lung cancer CAD, hyperlipidemia, hypertension, PVD w/ bilateral AKA. Patient was brought in by the emergency medical services when he was found in the middle of the road with broken wheelchair.                        Patient's urine tested positive for cannabinoids and amphetamine.                                                             ----------------------------------------------------------      SUBJECTIVE COMPLAINTS- follow up for SOB , lung cancer    Diet: DIET GENERAL;      OBJECTIVE:   Patient Active Problem List   Diagnosis    Type 2 diabetes mellitus with circulatory disorder ([facility])    Mixed hyperlipidemia    Essential hypertension    Phantom limb syndrome with pain (HCC)    Tobacco abuse    History of substance abuse ([facility] [address]    Chronic pain syndrome    Lumbosacral spondylosis without myelopathy    Superior vena cava compression syndrome    Mediastinal mass    Pulmonary embolus (HCC)    Lung cancer (HCC)    Atypical pneumonia    Pulmonary infiltrate    Centrilobular emphysema (HCC)    SOB (shortness of breath)    Hypertension    Weakness       Allergies  Niacin and related and Statins [statins]    Medications    Scheduled Meds:   allopurinol  300 mg Oral Daily    apixaban  5 mg Oral BID    aspirin  325 mg Oral Daily    cloNIDine  0.3 mg Oral TID    DULoxetine  60 mg Oral Daily    escitalopram  10 mg Oral Daily    hydrALAZINE  25 mg Oral TID    labetalol  300 mg Oral BID    nicotine  1 patch Transdermal Daily    omeprazole  20 mg Oral Daily    potassium chloride  10 mEq Oral Daily with breakfast    rosuvastatin  5 mg Oral Nightly    sodium chloride flush  10 mL Intravenous 2 times per day    tiotropium  2 puff Inhalation Daily     Continuous Infusions:  PRN Meds:  sodium chloride flush, acetaminophen **OR** acetaminophen, polyethylene glycol, promethazine **OR** ondansetron    Vitals   Vitals /wt   Patient Vitals for the past 8 hrs:   BP Temp Temp src Pulse Resp SpO2 Weight   11/09/20 0807 134/76 -- -- 78 -- -- --   11/09/20 0336 128/75 98 °F (36.7 °C) Oral 73 18 98 % 182 lb 15.7 oz (83 kg)        72HR INTAKE/OUTPUT:      Intake/Output Summary (Last 24 hours) at 11/9/2020 0918  Last data filed at 11/9/2020 0600  Gross per 24 hour   Intake 1620 ml   Output 800 ml   Net 820 ml       Exam:    Gen:   Alert and oriented ×3  Eyes: PERRL. No sclera icterus. No conjunctival injection. ENT: No discharge. Pharynx clear. External appearance of ears and nose normal.  Neck: Trachea midline. No obvious mass. Resp: No accessory muscle use. No crackles. No wheezes. No rhonchi.   CV:

## 2020-11-09 NOTE — CARE COORDINATION
TAYLOR consult for Placement:  TAYLOR has sent out additional referrals to [address] [facility]  [address], [address] [facility]) and [facility]. TAYLOR received called from [facility] they are reviewing patient. [facility] is not currently accepting new patients.       Referrals sent to:   [facility]- No  [facility]- No  [facility] - No   [place] [facility]-Left message  [address] post acute care-NO  [place]- Left message  [facility] [address] referral  [id number] [facility] left message  [address] on admissions  [address]- No  [address]-faxing referral    Sent referral:  [address] Worker available-  Kobi Stager  [phone]

## 2020-11-09 NOTE — PROGRESS NOTES
Patient is alert and oriented x4, up with x2 assist with wheelchair, call light within reach, bed/chair alarm on. AM meds complete, patient tolerated well. VSS and WDL. No s/s of distress, no further needs noted at this time.  Electronically signed by Brandi Oden RN on 11/9/2020 at 12:27 PM

## 2020-11-10 PROCEDURE — G0378 HOSPITAL OBSERVATION PER HR: HCPCS

## 2020-11-10 PROCEDURE — 6370000000 HC RX 637 (ALT 250 FOR IP): Performed by: NURSE PRACTITIONER

## 2020-11-10 PROCEDURE — 6370000000 HC RX 637 (ALT 250 FOR IP): Performed by: HOSPITALIST

## 2020-11-10 PROCEDURE — 94640 AIRWAY INHALATION TREATMENT: CPT

## 2020-11-10 PROCEDURE — 1200000000 HC SEMI PRIVATE

## 2020-11-10 PROCEDURE — 94760 N-INVAS EAR/PLS OXIMETRY 1: CPT

## 2020-11-10 RX ADMIN — HYDRALAZINE HYDROCHLORIDE 25 MG: 25 TABLET, FILM COATED ORAL at 20:59

## 2020-11-10 RX ADMIN — CLONIDINE HYDROCHLORIDE 0.3 MG: 0.1 TABLET ORAL at 08:27

## 2020-11-10 RX ADMIN — ALLOPURINOL 300 MG: 300 TABLET ORAL at 08:28

## 2020-11-10 RX ADMIN — TIOTROPIUM BROMIDE INHALATION SPRAY 2 PUFF: 3.12 SPRAY, METERED RESPIRATORY (INHALATION) at 07:47

## 2020-11-10 RX ADMIN — APIXABAN 5 MG: 5 TABLET, FILM COATED ORAL at 20:59

## 2020-11-10 RX ADMIN — APIXABAN 5 MG: 5 TABLET, FILM COATED ORAL at 08:27

## 2020-11-10 RX ADMIN — IPRATROPIUM BROMIDE AND ALBUTEROL SULFATE 1 AMPULE: .5; 3 SOLUTION RESPIRATORY (INHALATION) at 07:46

## 2020-11-10 RX ADMIN — IPRATROPIUM BROMIDE AND ALBUTEROL SULFATE 1 AMPULE: .5; 3 SOLUTION RESPIRATORY (INHALATION) at 20:46

## 2020-11-10 RX ADMIN — CLONIDINE HYDROCHLORIDE 0.3 MG: 0.1 TABLET ORAL at 20:59

## 2020-11-10 RX ADMIN — POTASSIUM CHLORIDE 10 MEQ: 750 TABLET, FILM COATED, EXTENDED RELEASE ORAL at 08:27

## 2020-11-10 RX ADMIN — ASPIRIN 325 MG ORAL TABLET 325 MG: 325 PILL ORAL at 08:27

## 2020-11-10 RX ADMIN — ESCITALOPRAM OXALATE 10 MG: 10 TABLET ORAL at 08:27

## 2020-11-10 RX ADMIN — DULOXETINE HYDROCHLORIDE 60 MG: 60 CAPSULE, DELAYED RELEASE ORAL at 08:27

## 2020-11-10 RX ADMIN — LABETALOL HYDROCHLORIDE 300 MG: 200 TABLET, FILM COATED ORAL at 08:31

## 2020-11-10 RX ADMIN — IPRATROPIUM BROMIDE AND ALBUTEROL SULFATE 1 AMPULE: .5; 3 SOLUTION RESPIRATORY (INHALATION) at 12:37

## 2020-11-10 RX ADMIN — IPRATROPIUM BROMIDE AND ALBUTEROL SULFATE 1 AMPULE: .5; 3 SOLUTION RESPIRATORY (INHALATION) at 15:50

## 2020-11-10 RX ADMIN — LABETALOL HYDROCHLORIDE 300 MG: 200 TABLET, FILM COATED ORAL at 20:59

## 2020-11-10 RX ADMIN — PSEUDOEPHEDRINE HYDROCHLORIDE 60 MG: 60 TABLET ORAL at 19:26

## 2020-11-10 RX ADMIN — ACETAMINOPHEN 650 MG: 325 TABLET ORAL at 00:08

## 2020-11-10 RX ADMIN — ROSUVASTATIN CALCIUM 5 MG: 10 TABLET, FILM COATED ORAL at 20:59

## 2020-11-10 RX ADMIN — HYDRALAZINE HYDROCHLORIDE 25 MG: 25 TABLET, FILM COATED ORAL at 08:27

## 2020-11-10 RX ADMIN — OMEPRAZOLE 20 MG: 20 CAPSULE, DELAYED RELEASE ORAL at 05:45

## 2020-11-10 ASSESSMENT — PAIN SCALES - GENERAL
PAINLEVEL_OUTOF10: 0
PAINLEVEL_OUTOF10: 0
PAINLEVEL_OUTOF10: 5
PAINLEVEL_OUTOF10: 0
PAINLEVEL_OUTOF10: 0

## 2020-11-10 NOTE — PROGRESS NOTES
Hospitalist Progress Note  11/10/2020 8:52 AM    PCP: Kusum Boyd MD    [id number]     Date of Admission: 11/7/2020                                                                                                                     HOSPITAL COURSE    Patient demographics:  The patient  Jayesh Etienne is a 62 y.o. male     Significant past medical history:   Patient Active Problem List   Diagnosis    Type 2 diabetes mellitus with circulatory disorder ([facility] [address]    Mixed hyperlipidemia    Essential hypertension    Phantom limb syndrome with pain ([facility] [address] [facility]    Tobacco abuse    History of substance abuse ([facility] [address]    Chronic pain syndrome    Lumbosacral spondylosis without myelopathy    Superior vena cava compression syndrome    Mediastinal mass    Pulmonary embolus ([facility] [address]    Lung cancer ([facility] [address]    Atypical pneumonia    Pulmonary infiltrate    Centrilobular emphysema (HCC)    SOB (shortness of breath)    Hypertension    Weakness         Presenting symptoms:  SOB, lung cancer, homeless    Diagnostic workup:  CT CHEST PULMONARY EMBOLISM W CONTRAST         CONSULTS DURING ADMISSION :   IP CONSULT TO SOCIAL WORK  IP CONSULT TO SOCIAL WORK  IP CONSULT TO ONCOLOGY  IP CONSULT TO SOCIAL WORK  IP CONSULT TO SOCIAL WORK      Patient was diagnosed with:  Hypertension  Small cell lung carcinoma:     Treatment while inpatient:  62years old male with medical history significant for pulmonary embolism, lung cancer CAD, hyperlipidemia, hypertension, PVD w/ bilateral AKA. Patient was brought in by the emergency medical services when he was found in the middle of the road with broken wheelchair.                        Patient's urine tested positive for cannabinoids and amphetamine.                                                             ----------------------------------------------------------      SUBJECTIVE COMPLAINTS- follow up for SOB , lung cancer    Diet: DIET GENERAL;      OBJECTIVE:   Patient Active Problem List Diagnosis    Type 2 diabetes mellitus with circulatory disorder (HCC)    Mixed hyperlipidemia    Essential hypertension    Phantom limb syndrome with pain (HCC)    Tobacco abuse    [facility] of [facility] abuse ([facility] [address]    Chronic pain syndrome    Lumbosacral spondylosis without myelopathy    Superior vena cava compression syndrome    Mediastinal mass    Pulmonary embolus (HCC)    Lung cancer (HCC)    Atypical pneumonia    Pulmonary infiltrate    Centrilobular emphysema (HCC)    SOB (shortness of breath)    Hypertension    Weakness       Allergies  Niacin and related and Statins [statins]    Medications    Scheduled Meds:   ipratropium-albuterol  1 ampule Inhalation Q4H WA    allopurinol  300 mg Oral Daily    apixaban  5 mg Oral BID    aspirin  325 mg Oral Daily    cloNIDine  0.3 mg Oral TID    DULoxetine  60 mg Oral Daily    escitalopram  10 mg Oral Daily    hydrALAZINE  25 mg Oral TID    labetalol  300 mg Oral BID    nicotine  1 patch Transdermal Daily    omeprazole  20 mg Oral Daily    potassium chloride  10 mEq Oral Daily with breakfast    rosuvastatin  5 mg Oral Nightly    sodium chloride flush  10 mL Intravenous 2 times per day    tiotropium  2 puff Inhalation Daily     Continuous Infusions:  PRN Meds:  pseudoephedrine, pseudoephedrine, sodium chloride flush, acetaminophen **OR** acetaminophen, polyethylene glycol, promethazine **OR** ondansetron    Vitals   Vitals /wt   Patient Vitals for the past 8 hrs:   BP Temp Temp src Pulse Resp SpO2 Weight   11/10/20 0805 (!) 172/84 95.4 °F (35.2 °C) Oral 80 18 98 % --   11/10/20 0748 -- -- -- -- 18 93 % --   11/10/20 0414 138/77 97.5 °F (36.4 °C) Oral 76 18 99 % 189 lb 2.5 oz (85.8 kg)        72HR INTAKE/OUTPUT:      Intake/Output Summary (Last 24 hours) at 11/10/2020 0852  Last data filed at 11/10/2020 0612  Gross per 24 hour   Intake 580 ml   Output 1695 ml   Net -1115 ml       Exam:    Gen:   Alert and oriented ×3  Eyes: PERRL.  No sclera icterus. No conjunctival injection. ENT: No discharge. Pharynx clear. External appearance of ears and nose normal.  Neck: Trachea midline. No obvious mass. Resp: No accessory muscle use. No crackles. No wheezes. No rhonchi. CV: Regular rate. Regular rhythm. No murmur or rub. No edema. GI: Non-tender. Non-distended. No hernia. Skin: Warm, dry, normal texture and turgor. Lymph: No cervical LAD. No supraclavicular LAD. M/S: / Ext. No cyanosis. No clubbing. No joint deformity. Neuro: CN 2-12 are intact,  no neurologic deficits noted. PT/INR: No results for input(s): PROTIME, INR in the last 72 hours. APTT: No results for input(s): APTT in the last 72 hours. CBC:   Recent Labs     11/07/20 1843 11/09/20  0821   WBC 3.7* 3.8*   HGB 10.4* 10.4*   HCT 31.0* 31.6*   MCV 87.4 87.4    302       BMP:   Recent Labs     11/07/20 1843 11/09/20  0822   * 132*   K 3.6 4.1   CL 96* 97*   CO2 25 23   BUN 5* 6*   CREATININE 0.5* <0.5*       LIVER PROFILE:   Recent Labs     11/07/20 1843   ALKPHOS 108   AST 13*   ALT 16   BILITOT 0.3     No results for input(s): AMYLASE in the last 72 hours. No results for input(s): LIPASE in the last 72 hours. UA:No results for input(s): NITRITE, LABCAST, WBCUA, RBCUA, MUCUS in the last 72 hours. TROPONIN:   No results for input(s): Parvin Brizuela in the last 72 hours. Lab Results   Component Value Date/Time    URRFLXCULT Not Indicated 02/29/2020 11:03 PM       No results for input(s): TSHREFLEX in the last 72 hours. No components found for: [id number]  POC GLUCOSE:  No results for input(s): POCGLU in the last 72 hours. No results for input(s): LABA1C in the last 72 hours.    Lab Results   Component Value Date    LABA1C 6.3 02/13/2020         ASSESSMENT AND PLAN         Small cell lung carcinoma:   Heme-onc managing chemo on an outpatient basis   Consult- hem otology oncology      Leukocytopenia anemia  Due to chemotherapy    Tobacco abuse Z72.0  Nicotine patch and counseling       Urine drug screen positive:   Amphetamine and cannabis      Hypertension:   Continue hypertension regimen      Homelessness:   consult social service: Social service consult notes indicate that there have been multiple applications placed to several different facilities to see if they will accept the patient.     All meds continued        Code Status: Full Code        Dispo -social work trying to get the skilled nursing facility        The patient and / or the family were informed of the results of any tests, a time was given to answer questions, a plan was proposed and they agreed with plan. Sanju Haley MD    This note was transcribed using [id number] [employer]. Please disregard any translational errors.

## 2020-11-10 NOTE — CARE COORDINATION
Discharge Planning:  SW received a call from Marilyn with [facility] stating that they are unable to accept this pt.   Electronically signed by Edgar Barahona on 11/10/2020 at 4:19 PM

## 2020-11-10 NOTE — CARE COORDINATION
SW consult for placement:    SW continues to search for placement -   Patient's actions at [facility] cause other facilities to not be willing to offer him placement. SW will extend search outside of the [facility] area. This Social Workers understanding regarding his Chemo and Radiation is that his Medicaid covers if it is done Here at [facility].  Transportation will be determined when placement is determined. I have sent out about 40 referrals and no accepting agency yet. SW will continue to seek placement. Omar Liu [phone]- called back from [address] [place] [address], she advised that she spoke with the  at [facility] and they will not take patient back. Left message for Lisa Alfred with [facility] - requesting an onsite visit, if declining patient. [phone]. Additional referrals sent to:(16 more referrals sent)  [facility]-(faxed) previously known as [facility] care. [place]- faxed  [phone] [facility].  [address] of [facility]

## 2020-11-10 NOTE — PLAN OF CARE
Problem: Infection:  Goal: Will remain free from infection  Description: Will remain free from infection  Outcome: Ongoing     Problem: Safety:  Goal: Free from accidental physical injury  Description: Free from accidental physical injury  Outcome: Ongoing     Problem: Daily Care:  Goal: Daily care needs are met  Description: Daily care needs are met  Outcome: Ongoing     Problem: Skin Integrity:  Goal: Skin integrity will stabilize  Description: Skin integrity will stabilize  Outcome: Ongoing     Problem: Discharge Planning:  Goal: Patients continuum of care needs are met  Description: Patients continuum of care needs are met  Outcome: Ongoing     Problem: Skin Integrity:  Goal: Will show no infection signs and symptoms  Description: Will show no infection signs and symptoms  Outcome: Ongoing

## 2020-11-10 NOTE — PROGRESS NOTES
Patient pleasant and cooperative this shift. Patient agreeable with rules and procedures of hospital. Patient calls out appropriately for needs and is capable of expressing needs.  Electronically signed by Jese Whitmore RN on 11/10/2020 at 6:41 PM

## 2020-11-11 PROCEDURE — 6370000000 HC RX 637 (ALT 250 FOR IP): Performed by: HOSPITALIST

## 2020-11-11 PROCEDURE — 94640 AIRWAY INHALATION TREATMENT: CPT

## 2020-11-11 PROCEDURE — 6370000000 HC RX 637 (ALT 250 FOR IP): Performed by: NURSE PRACTITIONER

## 2020-11-11 PROCEDURE — G0378 HOSPITAL OBSERVATION PER HR: HCPCS

## 2020-11-11 PROCEDURE — 94761 N-INVAS EAR/PLS OXIMETRY MLT: CPT

## 2020-11-11 RX ADMIN — ALLOPURINOL 300 MG: 300 TABLET ORAL at 09:01

## 2020-11-11 RX ADMIN — HYDRALAZINE HYDROCHLORIDE 25 MG: 25 TABLET, FILM COATED ORAL at 14:51

## 2020-11-11 RX ADMIN — PSEUDOEPHEDRINE HYDROCHLORIDE 60 MG: 60 TABLET ORAL at 22:12

## 2020-11-11 RX ADMIN — PSEUDOEPHEDRINE HYDROCHLORIDE 60 MG: 60 TABLET ORAL at 06:21

## 2020-11-11 RX ADMIN — CLONIDINE HYDROCHLORIDE 0.3 MG: 0.1 TABLET ORAL at 20:19

## 2020-11-11 RX ADMIN — OMEPRAZOLE 20 MG: 20 CAPSULE, DELAYED RELEASE ORAL at 06:20

## 2020-11-11 RX ADMIN — IPRATROPIUM BROMIDE AND ALBUTEROL SULFATE 1 AMPULE: .5; 3 SOLUTION RESPIRATORY (INHALATION) at 20:31

## 2020-11-11 RX ADMIN — ESCITALOPRAM OXALATE 10 MG: 10 TABLET ORAL at 09:01

## 2020-11-11 RX ADMIN — APIXABAN 5 MG: 5 TABLET, FILM COATED ORAL at 09:01

## 2020-11-11 RX ADMIN — ASPIRIN 325 MG ORAL TABLET 325 MG: 325 PILL ORAL at 09:01

## 2020-11-11 RX ADMIN — LABETALOL HYDROCHLORIDE 300 MG: 200 TABLET, FILM COATED ORAL at 09:00

## 2020-11-11 RX ADMIN — CLONIDINE HYDROCHLORIDE 0.3 MG: 0.1 TABLET ORAL at 14:51

## 2020-11-11 RX ADMIN — IPRATROPIUM BROMIDE AND ALBUTEROL SULFATE 1 AMPULE: .5; 3 SOLUTION RESPIRATORY (INHALATION) at 11:39

## 2020-11-11 RX ADMIN — PSEUDOEPHEDRINE HYDROCHLORIDE 60 MG: 60 TABLET ORAL at 11:35

## 2020-11-11 RX ADMIN — POTASSIUM CHLORIDE 10 MEQ: 750 TABLET, FILM COATED, EXTENDED RELEASE ORAL at 09:01

## 2020-11-11 RX ADMIN — ROSUVASTATIN CALCIUM 5 MG: 10 TABLET, FILM COATED ORAL at 20:19

## 2020-11-11 RX ADMIN — HYDRALAZINE HYDROCHLORIDE 25 MG: 25 TABLET, FILM COATED ORAL at 20:19

## 2020-11-11 RX ADMIN — IPRATROPIUM BROMIDE AND ALBUTEROL SULFATE 1 AMPULE: .5; 3 SOLUTION RESPIRATORY (INHALATION) at 08:11

## 2020-11-11 RX ADMIN — DULOXETINE HYDROCHLORIDE 60 MG: 60 CAPSULE, DELAYED RELEASE ORAL at 09:00

## 2020-11-11 RX ADMIN — LABETALOL HYDROCHLORIDE 300 MG: 200 TABLET, FILM COATED ORAL at 20:19

## 2020-11-11 RX ADMIN — CLONIDINE HYDROCHLORIDE 0.3 MG: 0.1 TABLET ORAL at 09:01

## 2020-11-11 RX ADMIN — IPRATROPIUM BROMIDE AND ALBUTEROL SULFATE 1 AMPULE: .5; 3 SOLUTION RESPIRATORY (INHALATION) at 15:29

## 2020-11-11 RX ADMIN — HYDRALAZINE HYDROCHLORIDE 25 MG: 25 TABLET, FILM COATED ORAL at 09:01

## 2020-11-11 RX ADMIN — APIXABAN 5 MG: 5 TABLET, FILM COATED ORAL at 20:19

## 2020-11-11 ASSESSMENT — PAIN SCALES - GENERAL
PAINLEVEL_OUTOF10: 0

## 2020-11-11 ASSESSMENT — PAIN SCALES - WONG BAKER: WONGBAKER_NUMERICALRESPONSE: 0

## 2020-11-11 NOTE — PROGRESS NOTES
[facility]    Consult Note      Subjective   He is doing ok   Awaiting placement   No new decline         Current Medications:    Current Facility-Administered Medications: pseudoephedrine (SUDAFED) tablet 60 mg, 60 mg, Oral, Q4H PRN  ipratropium-albuterol (DUONEB) nebulizer solution 1 ampule, 1 ampule, Inhalation, Q4H WA  pseudoephedrine (SUDAFED) tablet 60 mg, 60 mg, Oral, Q4H PRN  allopurinol (ZYLOPRIM) tablet 300 mg, 300 mg, Oral, Daily  apixaban (ELIQUIS) tablet 5 mg, 5 mg, Oral, BID  aspirin tablet 325 mg, 325 mg, Oral, Daily  cloNIDine (CATAPRES) tablet 0.3 mg, 0.3 mg, Oral, TID  DULoxetine (CYMBALTA) extended release capsule 60 mg, 60 mg, Oral, Daily  escitalopram (LEXAPRO) tablet 10 mg, 10 mg, Oral, Daily  hydrALAZINE (APRESOLINE) tablet 25 mg, 25 mg, Oral, TID  labetalol (NORMODYNE) tablet 300 mg, 300 mg, Oral, BID  nicotine (NICODERM CQ) 14 MG/24HR 1 patch, 1 patch, Transdermal, Daily  omeprazole (PRILOSEC) delayed release capsule 20 mg, 20 mg, Oral, Daily  potassium chloride (KLOR-CON) extended release tablet 10 mEq, 10 mEq, Oral, Daily with breakfast  rosuvastatin (CRESTOR) tablet 5 mg, 5 mg, Oral, Nightly  sodium chloride flush 0.9 % injection 10 mL, 10 mL, Intravenous, 2 times per day  sodium chloride flush 0.9 % injection 10 mL, 10 mL, Intravenous, PRN  acetaminophen (TYLENOL) tablet 650 mg, 650 mg, Oral, Q6H PRN **OR** acetaminophen (TYLENOL) suppository 650 mg, 650 mg, Rectal, Q6H PRN  polyethylene glycol (GLYCOLAX) packet 17 g, 17 g, Oral, Daily PRN  promethazine (PHENERGAN) tablet 12.5 mg, 12.5 mg, Oral, Q6H PRN **OR** ondansetron (ZOFRAN) injection 4 mg, 4 mg, Intravenous, Q6H PRN  Allergies:  Niacin and related and Statins [statins]      ROS per the HPI   Otherwise  10 point ROS negative     PHYSICAL EXAM:      Vitals:  BP (!) 185/93   Pulse 84   Temp 97.4 °F (36.3 °C) (Oral)   Resp 18   Ht 6' (1.829 m)   Wt 190 lb 14.7 oz (86.6 kg)   SpO2 97%   BMI 25.89 kg/m²     CONSTITUTIONAL:  awake, alert, cooperative, no apparent distress, and appears stated age NAD  EYES:  pupils equal, round and reactive to light, extra ocular muscles intact, sclera clear, conjunctiva normal  NECK:  Supple, symmetrical, trachea midline, no adenopathy, thyroid symmetric, not enlarged and no tenderness, skin normal  HEMATOLOGIC/LYMPHATICS:  no cervical lymphadenopathy, no supraclavicular lymphadenopathy,LUNGS:  No increased work of breathing, good air exchange, clear to auscultation bilaterally, no crackles or wheezing  CARDIOVASCULAR:  , regular rate and rhythm, normal S1 and S2, no S3 or S4, and no murmur noted  ABDOMEN:  No scars, normal bowel sounds, soft, non-distended, non-tender, no masses palpated, no hepatosplenomegally        DATA:    PT/INR:    No results for input(s): PROT, INR in the last 72 hours. PTT:  No results for input(s): APTT in the last 72 hours.   CMP:    Lab Results   Component Value Date     11/09/2020    K 4.1 11/09/2020    K 3.6 11/07/2020    CL 97 11/09/2020    CO2 23 11/09/2020    BUN 6 11/09/2020    PROT 6.3 11/07/2020     Magnesium:    Lab Results   Component Value Date    MG 2.20 11/05/2020     Phosphorus:  No components found for: PO4  Calcium:  No components found for: CA  CBC:    Lab Results   Component Value Date    WBC 3.8 11/09/2020    RBC 3.61 11/09/2020    HGB 10.4 11/09/2020    HCT 31.6 11/09/2020    MCV 87.4 11/09/2020    RDW 16.2 11/09/2020     11/09/2020     DIFF:    Lab Results   Component Value Date    MCV 87.4 11/09/2020    RDW 16.2 11/09/2020      LDH:  @labcrnt(LDH)@  Uric Acid:  @labcrnt(URIC)@    Radiology Review: Xr Chest (2 Vw)    Result Date: 11/6/2020  EXAMINATION: TWO XRAY VIEWS OF THE CHEST 11/6/2020 1:43 pm COMPARISON: October 22, 2020 HISTORY: ORDERING SYSTEM PROVIDED HISTORY: Shortness of breath TECHNOLOGIST PROVIDED HISTORY: Reason for exam:->Shortness of breath Reason for Exam: Shortness of breath Acuity: Acute Type of Exam: Initial Acute shortness of breath. FINDINGS: The cardiomediastinal silhouette is stable in size and contour. Large right hilar/mediastinal mass is again noted. There is a right IJ central venous catheter which extends to the lower SVC. No new infiltrate identified. Stable blunting of the costophrenic angles bilaterally, likely representing scarring. There is mild emphysema. No pneumothorax identified. No large pleural effusion. 1. Redemonstration of right hilar/mediastinal mass. 2. No significant interval change compared to 10/22/2020. 3. Mild emphysema. Xr Chest Portable    Result Date: 10/22/2020  EXAMINATION: ONE XRAY VIEW OF THE CHEST 10/22/2020 1:50 am COMPARISON: Chest radiographs 10/14/2020, 10/03/2020; CT chest 09/28/2020 HISTORY: ORDERING SYSTEM PROVIDED HISTORY: SOB TECHNOLOGIST PROVIDED HISTORY: Reason for exam:->SOB Reason for Exam: sob FINDINGS: Unchanged right perihilar convexity/masslike opacity. No new focal airspace disease. No pneumothorax. Blunted left costophrenic sulcus. Indistinct right costophrenic sulcus. Elevated right hemidiaphragm with colonic interposition. Stable heart size. Right chest port catheter tip terminates over the SVC. Small bilateral pleural effusions with unchanged right hilar mass. Colonic interposition may be seen with Chilaiditi syndrome in the appropriate context. Xr Chest Portable    Result Date: 10/14/2020  EXAMINATION: ONE XRAY VIEW OF THE CHEST 10/14/2020 1:48 am COMPARISON: 10/03/2020 HISTORY: ORDERING SYSTEM PROVIDED HISTORY: SOB TECHNOLOGIST PROVIDED HISTORY: Reason for exam:->SOB Reason for Exam: sob Acuity: Acute Relevant Medical/Surgical History: hx diabetes, hypertension FINDINGS: Right chest port is again noted. Right hilar/perihilar mass appears slightly improved though this may be due to differences in patient positioning. There is no acute airspace disease.   The heart size is normal.  There is a small left pleural effusion. There is no discernible pneumothorax. 1. No acute airspace disease. 2. Right hilar/perihilar mass again seen. Ct Chest Pulmonary Embolism W Contrast    Result Date: 11/6/2020  EXAMINATION: CTA OF THE CHEST 11/6/2020 4:15 pm TECHNIQUE: CTA of the chest was performed after the administration of intravenous contrast.  Multiplanar reformatted images are provided for review. MIP images are provided for review. Dose modulation, iterative reconstruction, and/or weight based adjustment of the mA/kV was utilized to reduce the radiation dose to as low as reasonably achievable. COMPARISON: Chest radiograph today. Chest CT 10/22/2020. HISTORY: ORDERING SYSTEM PROVIDED HISTORY: SOB, SCLC, recent PE TECHNOLOGIST PROVIDED HISTORY: Reason for exam:->SOB, SCLC, recent PE Reason for Exam: SOB, SCLC, recent PE Acuity: Acute Type of Exam: Initial FINDINGS: Pulmonary Arteries: Pulmonary arteries are adequately opacified for evaluation. Previously noted subsegmental embolism in the basal right lower lobe is not redemonstrated. .  No new pulmonary embolism. The right upper lobe segmental branches are 10 UA did by the infiltrative right hilar mass. Main pulmonary artery is normal in caliber. Mediastinum: Infiltrative right hilar mass again demonstrated with compressive affects on the right main pulmonary artery. This mass extends into the precarinal space and surrounds the SVC. Again this measures approximately 9 x 7 cm. No pericardial effusion. Scattered additional small mediastinal lymph nodes are again noted. The aorta is normal in caliber. Calcified atheromatous plaque and coronary calcifications are noted. Lungs/pleura: Peripheral ground-glass opacities in the lateral right upper lobe appear new. Patchy ground-glass opacities in the right lung apex appear more prominent. Peribronchial wall thickening in the right upper lobe is unchanged. Small right pleural effusion has developed.   Minimal dependent 6.7 x 8.0 cm. Severe narrowing of the downstream SVC with resultant mediastinal and left chest collaterals and filling of the azygos system. The azygos-SVC confluence is obliterated. Obliteration of the right upper lobe lobar pulmonary artery, superior right pulmonary vein and mass effect and right upper lobe bronchial structures. Right hilar and mediastinal lymphadenopathy. Normal caliber thoracic aorta. Multivessel coronary calcifications. No pericardial effusion. Lungs/pleura: Small right pleural effusion. Ground-glass abnormality surrounds the right hilar/mediastinal mass. Bronchial walls are thickened in this distribution and there is interlobular septal thickening. [place] nodule in the subpleural left lower lobe is most suspicious for benign parenchymal lymph node. Mild centrilobular emphysema. Patchy nodular opacities in the right upper lobe. Upper Abdomen: Limited images of the upper abdomen are unremarkable for any acute findings. Nodular appearance of the left adrenal gland is unchanged, obscured by motion on today's exam. Soft Tissues/Bones: Colonic interposition with gas-filled transverse colon. No suspicious osteolytic or osteoblastic lesion. Subsegmental acute pulmonary embolism in the right lower lobe posterior basal branch. Interval increase in size of infiltrative right hilar/mediastinal mass with extrinsic compression/invasion of bronchovascular structures, as described. Metastatic right hilar and mediastinal lymph nodes. Ground-glass halo has developed in the right upper lobe along the distribution of mass, either representing lymphatic/venous congestion, locoregional disease spread or postobstructive infection. More nodular opacities in the right upper lobe are suspicious for the latter as well. Critical results were called by Dr. Maryanne Wilkins to [facility] on 10/22/2020 at 04:07.      Mri Brain W Wo Contrast    Result Date: 10/22/2020  EXAMINATION: MRI OF THE BRAIN WITHOUT AND WITH CONTRAST  10/22/2020 9:39 am TECHNIQUE: Multiplanar multisequence MRI of the head/brain was performed without and with the administration of intravenous contrast. COMPARISON: None. HISTORY: ORDERING SYSTEM PROVIDED HISTORY: small cell eval brain mets TECHNOLOGIST PROVIDED HISTORY: Reason for exam:->small cell eval brain mets Reason for Exam: Small Cell cancer, Eval for mets Acuity: Unknown Type of Exam: Initial Relevant Medical/Surgical History: Lung cancer FINDINGS: Motion degrades images limiting evaluation. INTRACRANIAL STRUCTURES/VENTRICLES:  There is no acute infarct. No mass effect or midline shift. No evidence of an acute intracranial hemorrhage. Areas of T2 FLAIR hyperintensity are seen in the periventricular and subcortical white matter, which are nonspecific, but may represent chronic microvascular ischemic change. There is prominence of the ventricles and sulci due to global parenchymal volume loss. The sellar/suprasellar regions appear unremarkable. The normal signal voids within the major intracranial vessels appear maintained. No abnormal focus of enhancement is seen within the brain. ORBITS: The visualized portion of the orbits demonstrate no acute abnormality. SINUSES: Minimal scattered mucosal thickening of the paranasal sinuses. Bilateral mastoid effusions, left greater than right. BONES/SOFT TISSUES: The bone marrow signal intensity is not well evaluated due to motion the soft tissues demonstrate no acute abnormality. 1. Patient motion degrades images limiting evaluation. 2. No convincing acute intracranial abnormality. No acute infarct. 3. No gross evidence to suggest intracranial metastatic disease. 4. Mild global parenchymal volume loss with mild chronic microvascular ischemic changes. 5. Bilateral mastoid effusions, left greater than right.          Problem List  Patient Active Problem List   Diagnosis    Type 2 diabetes mellitus with circulatory disorder ([facility] [address]  Mixed hyperlipidemia    Essential hypertension    Phantom limb syndrome with pain (HCC)    Tobacco abuse    History of substance abuse ([address]    Chronic pain syndrome    Lumbosacral spondylosis without myelopathy    Superior vena cava compression syndrome    Mediastinal mass    Pulmonary embolus (HCC)    Lung cancer (HCC)    Atypical pneumonia    Pulmonary infiltrate    Centrilobular emphysema (HCC)    SOB (shortness of breath)    Hypertension    Weakness       IMPRESSION/RECOMMENDATIONS:  Small cell lung ca  He has had 2 cycles of chemo   He is due cycle 3 11/16  He is also due to start radiation as well   Thus upon dc we have to have a place that will transport him for chemo and radiation -  I have stressed this in social work orders  I mariana andrade aware of his situation       pe-continue anticoagulation

## 2020-11-11 NOTE — PLAN OF CARE
Problem: Infection:  Goal: Will remain free from infection  Description: Will remain free from infection  Outcome: Ongoing     Problem: Safety:  Goal: Free from accidental physical injury  Description: Free from accidental physical injury  Outcome: Ongoing     Problem: Daily Care:  Goal: Daily care needs are met  Description: Daily care needs are met  Outcome: Ongoing     Problem: Pain:  Goal: Patient's pain/discomfort is manageable  Description: Patient's pain/discomfort is manageable  Outcome: Ongoing     Problem: Skin Integrity:  Goal: Skin integrity will stabilize  Description: Skin integrity will stabilize  Outcome: Ongoing     Problem: Discharge Planning:  Goal: Patients continuum of care needs are met  Description: Patients continuum of care needs are met  Outcome: Ongoing

## 2020-11-11 NOTE — PROGRESS NOTES
Hospitalist Progress Note  11/11/2020 8:26 AM    PCP: Marcela Arellano MD    [id number]     Date of Admission: 11/7/2020                                                                                                                     HOSPITAL COURSE    Patient demographics:  The patient  Meera Hicks is a 62 y.o. male     Significant past medical history:   Patient Active Problem List   Diagnosis    Type 2 diabetes mellitus with circulatory disorder [facility] [address]    Mixed hyperlipidemia    Essential hypertension    Phantom limb syndrome with pain ([address]    Tobacco abuse    History of substance abuse ([facility] [address]    Chronic pain syndrome    Lumbosacral spondylosis without myelopathy    Superior vena cava compression syndrome    Mediastinal mass    Pulmonary embolus ([facility] [address]    Lung cancer ([facility] [address]    Atypical pneumonia    Pulmonary infiltrate    Centrilobular emphysema (HCC)    SOB (shortness of breath)    Hypertension    Weakness         Presenting symptoms:  SOB, lung cancer, homeless    Diagnostic workup:  CT CHEST PULMONARY EMBOLISM W CONTRAST         CONSULTS DURING ADMISSION :   IP CONSULT TO SOCIAL WORK  IP CONSULT TO SOCIAL WORK  IP CONSULT TO ONCOLOGY  IP CONSULT TO SOCIAL WORK  IP CONSULT TO SOCIAL WORK      Patient was diagnosed with:  Hypertension  Small cell lung carcinoma:     Treatment while inpatient:  62years old male with medical history significant for pulmonary embolism, lung cancer CAD, hyperlipidemia, hypertension, PVD w/ bilateral AKA. Patient was brought in by the emergency medical services when he was found in the middle of the road with broken wheelchair.                        Patient's urine tested positive for cannabinoids and amphetamine.                                                             ----------------------------------------------------------      SUBJECTIVE COMPLAINTS- follow up for SOB , lung cancer    Diet: DIET GENERAL;      OBJECTIVE:   Patient Active Problem List Diagnosis    Type 2 diabetes mellitus with circulatory disorder (HCC)    Mixed hyperlipidemia    Essential hypertension    Phantom limb syndrome with pain (HCC)    Tobacco abuse    History of substance abuse ([facility] [address]    Chronic pain syndrome    Lumbosacral spondylosis without myelopathy    Superior vena cava compression syndrome    Mediastinal mass    Pulmonary embolus (HCC)    Lung cancer (HCC)    Atypical pneumonia    Pulmonary infiltrate    Centrilobular emphysema (HCC)    SOB (shortness of breath)    Hypertension    Weakness       Allergies  Niacin and related and Statins [statins]    Medications    Scheduled Meds:   ipratropium-albuterol  1 ampule Inhalation Q4H WA    allopurinol  300 mg Oral Daily    apixaban  5 mg Oral BID    aspirin  325 mg Oral Daily    cloNIDine  0.3 mg Oral TID    DULoxetine  60 mg Oral Daily    escitalopram  10 mg Oral Daily    hydrALAZINE  25 mg Oral TID    labetalol  300 mg Oral BID    nicotine  1 patch Transdermal Daily    omeprazole  20 mg Oral Daily    potassium chloride  10 mEq Oral Daily with breakfast    rosuvastatin  5 mg Oral Nightly    sodium chloride flush  10 mL Intravenous 2 times per day     Continuous Infusions:  PRN Meds:  pseudoephedrine, pseudoephedrine, sodium chloride flush, acetaminophen **OR** acetaminophen, polyethylene glycol, promethazine **OR** ondansetron    Vitals   Vitals /wt   Patient Vitals for the past 8 hrs:   BP Temp Temp src Pulse Resp SpO2 Weight   11/11/20 0813 -- -- -- -- -- 98 % --   11/11/20 0341 (!) 185/93 97.4 °F (36.3 °C) Oral 84 18 97 % --   11/11/20 0308 -- -- -- -- -- -- 190 lb 14.7 oz (86.6 kg)        72HR INTAKE/OUTPUT:      Intake/Output Summary (Last 24 hours) at 11/11/2020 0826  Last data filed at 11/11/2020 0600  Gross per 24 hour   Intake 720 ml   Output 1375 ml   Net -655 ml       Exam:    Gen:   Alert and oriented ×3  Eyes: PERRL. No sclera icterus. No conjunctival injection. ENT: No discharge. Pharynx clear. External appearance of ears and nose normal.  Neck: Trachea midline. No obvious mass. Resp: No accessory muscle use. No crackles. No wheezes. No rhonchi. CV: Regular rate. Regular rhythm. No murmur or rub. No edema. GI: Non-tender. Non-distended. No hernia. Skin: Warm, dry, normal texture and turgor. Lymph: No cervical LAD. No supraclavicular LAD. M/S: / Ext. No cyanosis. No clubbing. No joint deformity. Neuro: CN 2-12 are intact,  no neurologic deficits noted. PT/INR: No results for input(s): PROTIME, INR in the last 72 hours. APTT: No results for input(s): APTT in the last 72 hours. CBC:   Recent Labs     11/09/20  0821   WBC 3.8*   HGB 10.4*   HCT 31.6*   MCV 87.4          BMP:   Recent Labs     11/09/20  0822   *   K 4.1   CL 97*   CO2 23   BUN 6*   CREATININE <0.5*       LIVER PROFILE:   No results for input(s): ALKPHOS, AST, ALT, ALB, BILIDIR, BILITOT, ALKPHOS in the last 72 hours. No results for input(s): AMYLASE in the last 72 hours. No results for input(s): LIPASE in the last 72 hours. UA:No results for input(s): NITRITE, LABCAST, WBCUA, RBCUA, MUCUS in the last 72 hours. TROPONIN:   No results for input(s): Sandra Daryl in the last 72 hours. Lab Results   Component Value Date/Time    URRFLXCULT Not Indicated 02/29/2020 11:03 PM       No results for input(s): TSHREFLEX in the last 72 hours. No components found for: [id number]  POC GLUCOSE:  No results for input(s): POCGLU in the last 72 hours. No results for input(s): LABA1C in the last 72 hours.    Lab Results   Component Value Date    LABA1C 6.3 02/13/2020         ASSESSMENT AND PLAN      COPD  Bronchodilators      Small cell lung carcinoma:   Heme-onc managing chemo on an outpatient basis   Consult- hem otology oncology      Leukocytopenia anemia  Due to chemotherapy    Tobacco abuse Z72.0  Nicotine patch and counseling       Urine drug screen positive:   Amphetamine and cannabis      Hypertension:   Continue hypertension regimen      Homelessness:   consult social service: Social service consult notes indicate that there have been multiple applications placed to several different facilities to see if they will accept the patient.     All meds continued        Code Status: Full Code        Dispo -social work trying to get the skilled nursing facility        The patient and / or the family were informed of the results of any tests, a time was given to answer questions, a plan was proposed and they agreed with plan. Noah Hartman MD    This note was transcribed using [id number] Please disregard any translational errors.

## 2020-11-11 NOTE — PLAN OF CARE
Problem: Infection:  Goal: Will remain free from infection  Description: Will remain free from infection  11/11/2020 1036 by Keagan Culp RN  Outcome: Ongoing     Problem: Safety:  Goal: Free from accidental physical injury  Description: Free from accidental physical injury  11/11/2020 1036 by Keagan Culp RN  Outcome: Ongoing     Problem: Daily Care:  Goal: Daily care needs are met  Description: Daily care needs are met  11/11/2020 1036 by Keagan Culp RN  Outcome: Ongoing     Problem: Skin Integrity:  Goal: Skin integrity will stabilize  Description: Skin integrity will stabilize  11/11/2020 1036 by Keagan Culp RN  Outcome: Ongoing     Problem: Discharge Planning:  Goal: Patients continuum of care needs are met  Description: Patients continuum of care needs are met  11/11/2020 1036 by Keagan Culp RN  Outcome: Ongoing

## 2020-11-12 PROCEDURE — G0378 HOSPITAL OBSERVATION PER HR: HCPCS

## 2020-11-12 PROCEDURE — 2700000000 HC OXYGEN THERAPY PER DAY

## 2020-11-12 PROCEDURE — 6370000000 HC RX 637 (ALT 250 FOR IP): Performed by: NURSE PRACTITIONER

## 2020-11-12 PROCEDURE — 94640 AIRWAY INHALATION TREATMENT: CPT

## 2020-11-12 PROCEDURE — 94760 N-INVAS EAR/PLS OXIMETRY 1: CPT

## 2020-11-12 PROCEDURE — 6370000000 HC RX 637 (ALT 250 FOR IP): Performed by: HOSPITALIST

## 2020-11-12 RX ADMIN — CLONIDINE HYDROCHLORIDE 0.3 MG: 0.1 TABLET ORAL at 08:04

## 2020-11-12 RX ADMIN — HYDRALAZINE HYDROCHLORIDE 25 MG: 25 TABLET, FILM COATED ORAL at 14:26

## 2020-11-12 RX ADMIN — LABETALOL HYDROCHLORIDE 300 MG: 200 TABLET, FILM COATED ORAL at 21:49

## 2020-11-12 RX ADMIN — ALLOPURINOL 300 MG: 300 TABLET ORAL at 08:04

## 2020-11-12 RX ADMIN — CLONIDINE HYDROCHLORIDE 0.3 MG: 0.1 TABLET ORAL at 21:49

## 2020-11-12 RX ADMIN — ESCITALOPRAM OXALATE 10 MG: 10 TABLET ORAL at 08:04

## 2020-11-12 RX ADMIN — ASPIRIN 325 MG ORAL TABLET 325 MG: 325 PILL ORAL at 08:04

## 2020-11-12 RX ADMIN — IPRATROPIUM BROMIDE AND ALBUTEROL SULFATE 1 AMPULE: .5; 3 SOLUTION RESPIRATORY (INHALATION) at 08:51

## 2020-11-12 RX ADMIN — APIXABAN 5 MG: 5 TABLET, FILM COATED ORAL at 08:04

## 2020-11-12 RX ADMIN — OMEPRAZOLE 20 MG: 20 CAPSULE, DELAYED RELEASE ORAL at 05:05

## 2020-11-12 RX ADMIN — POTASSIUM CHLORIDE 10 MEQ: 750 TABLET, FILM COATED, EXTENDED RELEASE ORAL at 08:04

## 2020-11-12 RX ADMIN — IPRATROPIUM BROMIDE AND ALBUTEROL SULFATE 1 AMPULE: .5; 3 SOLUTION RESPIRATORY (INHALATION) at 11:49

## 2020-11-12 RX ADMIN — ROSUVASTATIN CALCIUM 5 MG: 10 TABLET, FILM COATED ORAL at 21:49

## 2020-11-12 RX ADMIN — CLONIDINE HYDROCHLORIDE 0.3 MG: 0.1 TABLET ORAL at 14:26

## 2020-11-12 RX ADMIN — DULOXETINE HYDROCHLORIDE 60 MG: 60 CAPSULE, DELAYED RELEASE ORAL at 08:04

## 2020-11-12 RX ADMIN — LABETALOL HYDROCHLORIDE 300 MG: 200 TABLET, FILM COATED ORAL at 08:04

## 2020-11-12 RX ADMIN — IPRATROPIUM BROMIDE AND ALBUTEROL SULFATE 1 AMPULE: .5; 3 SOLUTION RESPIRATORY (INHALATION) at 01:49

## 2020-11-12 RX ADMIN — APIXABAN 5 MG: 5 TABLET, FILM COATED ORAL at 21:49

## 2020-11-12 RX ADMIN — IPRATROPIUM BROMIDE AND ALBUTEROL SULFATE 1 AMPULE: .5; 3 SOLUTION RESPIRATORY (INHALATION) at 17:38

## 2020-11-12 RX ADMIN — HYDRALAZINE HYDROCHLORIDE 25 MG: 25 TABLET, FILM COATED ORAL at 21:49

## 2020-11-12 RX ADMIN — IPRATROPIUM BROMIDE AND ALBUTEROL SULFATE 1 AMPULE: .5; 3 SOLUTION RESPIRATORY (INHALATION) at 20:26

## 2020-11-12 RX ADMIN — HYDRALAZINE HYDROCHLORIDE 25 MG: 25 TABLET, FILM COATED ORAL at 08:04

## 2020-11-12 ASSESSMENT — PAIN SCALES - GENERAL
PAINLEVEL_OUTOF10: 0

## 2020-11-12 NOTE — PROGRESS NOTES
Hospitalist Progress Note  11/12/2020 9:35 AM    PCP: Yajaira Raygoza MD    [id number]     Date of Admission: 11/7/2020                                                                                                                     HOSPITAL COURSE    Patient demographics:  The patient  Earlene Najera is a 62 y.o. male     Significant past medical history:   Patient Active Problem List   Diagnosis    Type 2 diabetes mellitus with circulatory disorder ([facility] [address]    Mixed hyperlipidemia    Essential hypertension    Phantom limb syndrome with pain ([facility] [address]    Tobacco abuse    History of substance abuse ([facility] [address]    Chronic pain syndrome    Lumbosacral spondylosis without myelopathy    Superior vena cava compression syndrome    Mediastinal mass    Pulmonary embolus ([facility] [address]    Lung cancer ([facility] [address]    Atypical pneumonia    Pulmonary infiltrate    Centrilobular emphysema (HCC)    SOB (shortness of breath)    Hypertension    Weakness         Presenting symptoms:  SOB, lung cancer, homeless    Diagnostic workup:  CT CHEST PULMONARY EMBOLISM W CONTRAST         CONSULTS DURING ADMISSION :   IP CONSULT TO SOCIAL WORK  IP CONSULT TO SOCIAL WORK  IP CONSULT TO ONCOLOGY  IP CONSULT TO SOCIAL WORK  IP CONSULT TO SOCIAL WORK      Patient was diagnosed with:  Hypertension  Small cell lung carcinoma:     Treatment while inpatient:  62years old male with medical history significant for pulmonary embolism, lung cancer CAD, hyperlipidemia, hypertension, PVD w/ bilateral AKA. Patient was brought in by the emergency medical services when he was found in the middle of the road with broken wheelchair.                        Patient's urine tested positive for cannabinoids and amphetamine.                                                             ----------------------------------------------------------      SUBJECTIVE COMPLAINTS- follow up for SOB , lung cancer    Diet: DIET GENERAL;      OBJECTIVE:   Patient Active Problem List and oriented ×3  Eyes: PERRL. No sclera icterus. No conjunctival injection. ENT: No discharge. Pharynx clear. External appearance of ears and nose normal.  Neck: Trachea midline. No obvious mass. Resp: No accessory muscle use. No crackles. No wheezes. No rhonchi. CV: Regular rate. Regular rhythm. No murmur or rub. No edema. GI: Non-tender. Non-distended. No hernia. Skin: Warm, dry, normal texture and turgor. Lymph: No cervical LAD. No supraclavicular LAD. M/S: / Ext. No cyanosis. No clubbing. No joint deformity. Neuro: CN 2-12 are intact,  no neurologic deficits noted. PT/INR: No results for input(s): PROTIME, INR in the last 72 hours. APTT: No results for input(s): APTT in the last 72 hours. CBC:   No results for input(s): WBC, HGB, HCT, MCV, PLT in the last 72 hours. BMP:   No results for input(s): NA, K, CL, CO2, PHOS, BUN, CREATININE in the last 72 hours. Invalid input(s): CA    LIVER PROFILE:   No results for input(s): ALKPHOS, AST, ALT, ALB, BILIDIR, BILITOT, ALKPHOS in the last 72 hours. No results for input(s): AMYLASE in the last 72 hours. No results for input(s): LIPASE in the last 72 hours. UA:No results for input(s): NITRITE, LABCAST, WBCUA, RBCUA, MUCUS in the last 72 hours. TROPONIN:   No results for input(s): Kolby Felton in the last 72 hours. Lab Results   Component Value Date/Time    URRFLXCULT Not Indicated 02/29/2020 11:03 PM       No results for input(s): TSHREFLEX in the last 72 hours. No components found for: [id number]  POC GLUCOSE:  No results for input(s): POCGLU in the last 72 hours. No results for input(s): LABA1C in the last 72 hours.    Lab Results   Component Value Date    LABA1C 6.3 02/13/2020         ASSESSMENT AND PLAN      COPD  Bronchodilators      Small cell lung carcinoma:   Heme-onc managing chemo on an outpatient basis   Consult- hem otology oncology      Leukocytopenia anemia  Due to chemotherapy    Tobacco abuse Z72.0  Nicotine patch and counseling       Urine drug screen positive:   Amphetamine and cannabis      Hypertension:   Continue hypertension regimen      Homelessness:   consult social service: Social service consult notes indicate that there have been multiple applications placed to several different facilities to see if they will accept the patient.     All meds continued        Code Status: Full Code        Dispo -social work has not been able to get a skilled nursing facility to accept the patient        The patient and / or the family were informed of the results of any tests, a time was given to answer questions, a plan was proposed and they agreed with plan. Ilya Harrison MD    This note was transcribed using [id number] [employer]. Please disregard any translational errors.

## 2020-11-12 NOTE — PROGRESS NOTES
Pt was complaining of shortness of breath at this time when trying to use the urinal. Pt was placed on 2L nasal canula with an oxygen saturation at 97%. Pt has been needing oxygen off and on through admission. Pt will get another breathing treatment at midnight that he states helps him breath. PRN sudafed was given at this time. Will continue to monitor patient.      Electronically signed by Bonita Long RN on 11/11/2020 at 10:21 PM

## 2020-11-12 NOTE — PROGRESS NOTES
RN went to give pt morning medications and pt was compliant and very pleasant at this time. Pt denies any further needs this am. Will continue to monitor.      Electronically signed by Jeyson Alston RN on 11/12/2020 at 5:10 AM

## 2020-11-12 NOTE — PROGRESS NOTES
RN went to do assessment on patient and to pass nightitme medications. Patient is very cooperative at this time and pleasant. Patient has been using manners and is showing how grateful he is for his care. Patient is not seen showing any hostility at this time and denies any needs. Will continue to monitor.      Electronically signed by Khadra Guajardo RN on 11/11/2020 at 8:25 PM

## 2020-11-13 VITALS
HEIGHT: 72 IN | BODY MASS INDEX: 25.95 KG/M2 | RESPIRATION RATE: 16 BRPM | SYSTOLIC BLOOD PRESSURE: 175 MMHG | TEMPERATURE: 97.9 F | DIASTOLIC BLOOD PRESSURE: 81 MMHG | HEART RATE: 89 BPM | OXYGEN SATURATION: 92 % | WEIGHT: 191.58 LBS

## 2020-11-13 PROBLEM — Z85.118 H/O: LUNG CANCER: Status: ACTIVE | Noted: 2020-11-13

## 2020-11-13 PROCEDURE — 94760 N-INVAS EAR/PLS OXIMETRY 1: CPT

## 2020-11-13 PROCEDURE — G0378 HOSPITAL OBSERVATION PER HR: HCPCS

## 2020-11-13 PROCEDURE — 6370000000 HC RX 637 (ALT 250 FOR IP): Performed by: NURSE PRACTITIONER

## 2020-11-13 PROCEDURE — 93970 EXTREMITY STUDY: CPT

## 2020-11-13 PROCEDURE — 1200000000 HC SEMI PRIVATE

## 2020-11-13 RX ORDER — ACETAMINOPHEN 325 MG/1
650 TABLET ORAL EVERY 6 HOURS PRN
Status: DISCONTINUED | OUTPATIENT
Start: 2020-11-13 | End: 2020-11-13 | Stop reason: HOSPADM

## 2020-11-13 RX ADMIN — CLONIDINE HYDROCHLORIDE 0.3 MG: 0.1 TABLET ORAL at 15:21

## 2020-11-13 RX ADMIN — OMEPRAZOLE 20 MG: 20 CAPSULE, DELAYED RELEASE ORAL at 06:25

## 2020-11-13 RX ADMIN — CLONIDINE HYDROCHLORIDE 0.3 MG: 0.1 TABLET ORAL at 10:40

## 2020-11-13 RX ADMIN — DULOXETINE HYDROCHLORIDE 60 MG: 60 CAPSULE, DELAYED RELEASE ORAL at 10:39

## 2020-11-13 RX ADMIN — POTASSIUM CHLORIDE 10 MEQ: 750 TABLET, FILM COATED, EXTENDED RELEASE ORAL at 10:40

## 2020-11-13 RX ADMIN — HYDRALAZINE HYDROCHLORIDE 25 MG: 25 TABLET, FILM COATED ORAL at 15:21

## 2020-11-13 RX ADMIN — ALLOPURINOL 300 MG: 300 TABLET ORAL at 10:42

## 2020-11-13 RX ADMIN — APIXABAN 5 MG: 5 TABLET, FILM COATED ORAL at 10:41

## 2020-11-13 RX ADMIN — ESCITALOPRAM OXALATE 10 MG: 10 TABLET ORAL at 10:41

## 2020-11-13 RX ADMIN — LABETALOL HYDROCHLORIDE 300 MG: 200 TABLET, FILM COATED ORAL at 10:40

## 2020-11-13 RX ADMIN — HYDRALAZINE HYDROCHLORIDE 25 MG: 25 TABLET, FILM COATED ORAL at 10:41

## 2020-11-13 RX ADMIN — ASPIRIN 325 MG ORAL TABLET 325 MG: 325 PILL ORAL at 10:39

## 2020-11-13 NOTE — PLAN OF CARE
Problem: Infection:  Goal: Will remain free from infection  Description: Will remain free from infection  11/13/2020 0154 by Nathan Kang RN  Outcome: Ongoing  11/12/2020 1649 by Christine Rosa RN  Outcome: Ongoing     Problem: Safety:  Goal: Free from accidental physical injury  Description: Free from accidental physical injury  11/13/2020 0154 by Nathan Kang RN  Outcome: Ongoing  11/12/2020 1649 by Christine Rosa RN  Outcome: Ongoing  Goal: Free from intentional harm  Description: Free from intentional harm  11/13/2020 0154 by Nathan Kang RN  Outcome: Ongoing  11/12/2020 1649 by Christine Rosa RN  Outcome: Ongoing     Problem: Daily Care:  Goal: Daily care needs are met  Description: Daily care needs are met  11/13/2020 0154 by Nathan Kang RN  Outcome: Ongoing  11/12/2020 1649 by Christine Rosa RN  Outcome: Ongoing     Problem: Pain:  Goal: Patient's pain/discomfort is manageable  Description: Patient's pain/discomfort is manageable  11/13/2020 0154 by Nathan Kang RN  Outcome: Ongoing  11/12/2020 1649 by Christine Rosa RN  Outcome: Ongoing     Problem: Skin Integrity:  Goal: Skin integrity will stabilize  Description: Skin integrity will stabilize  11/13/2020 0154 by Nathan Kang RN  Outcome: Ongoing  11/12/2020 1649 by Christine Rosa RN  Outcome: Ongoing     Problem: Skin Integrity:  Goal: Will show no infection signs and symptoms  Description: Will show no infection signs and symptoms  11/13/2020 0154 by Nathan Kang RN  Outcome: Ongoing  11/12/2020 1649 by Christine Rosa RN  Outcome: Ongoing  Goal: Absence of new skin breakdown  Description: Absence of new skin breakdown  11/13/2020 0154 by Nathan Kang RN  Outcome: Ongoing  11/12/2020 1649 by Christine Rosa RN  Outcome: Ongoing

## 2020-11-13 NOTE — CARE COORDINATION
Patient to d/c to [facility] [address]. Transportation with [facility] at [facility].    [employer]:  Report: [phone]  Fax: [phone]    SW to complete level of care and HENS. Alwin Councilman, MSW, LSW on 11/13/2020 at 14:52.

## 2020-11-13 NOTE — PROGRESS NOTES
Prn pain medication given as ordered.  Will continue to monitor and assess Electronically signed by Xuan Munoz RN on 11/13/2020 at 6:48 AM

## 2020-11-13 NOTE — PROGRESS NOTES
4 Eyes Skin Assessment     NAME:  Eleuterio Cool  YOB: 1960  MEDICAL RECORD NUMBER:  [id number]    The patient is being assess for  Admission    I agree that 2 RN's have performed a thorough Head to Toe Skin Assessment on the patient. ALL assessment sites listed below have been assessed. Areas assessed by both nurses:    Head, Face, Ears, Shoulders, Back, Chest, Arms, Elbows, Hands, Sacrum. Buttock, Coccyx, Ischium and Legs. Feet and Heels        Does the Patient have a Wound?  No noted wound(s)       Brayan Prevention initiated:  No   Wound Care Orders initiated:  No    Pressure Injury (Stage 3,4, Unstageable, DTI, NWPT, and Complex wounds) if present place consult order under [de-identified] No    New and Established Ostomies if present place consult order under : No      Nurse 1 eSignature: Electronically signed by Mellisa Vaughan RN on 11/13/20 at 12:10 AM EST    **SHARE this note so that the co-signing nurse is able to place an eSignature**    Nurse 2 eSignature: Electronically signed by Colten Savage RN on 11/13/20 at 12:10 AM EST

## 2020-11-13 NOTE — DISCHARGE INSTR - COC
Continuity of Care Form    Patient Name: Joana Ascencio   :  1960  MRN:  [id number]    Admit date:  2020  Discharge date:  2020    Code Status Order: Full Code   Advance Directives:   Advance Care Flowsheet Documentation       Date/Time Healthcare Directive Type of Healthcare Directive Copy in [address] Agent's Name Healthcare Agent's Phone Number    20 2213  No, patient does not have an advance directive for healthcare treatment -- -- -- -- --            Admitting Physician:  Destiny Goodson MD  PCP: Christi Shankar MD    Discharging Nurse:Parvin SAEZ  [address] Unit/Room#: [facility] Unit Phone Number: [phone]    Emergency Contact:   Extended Emergency Contact Information  Primary Emergency Contact: Mynor Ronak  [facility] Phone: [phone]  Relation: Other    Past Surgical History:  Past Surgical History:   Procedure Laterality Date    ABOVE KNEE AMPUTATION  ,    both    BRONCHOSCOPY N/A 2020    BRONCHOSCOPY performed by Ian Escamilla MD at [address]  2020    BRONCHOSCOPY W/EBUS FNA performed by Ian Escamilla MD at [place] [address]  4-14    IR PORT PLACEMENT EQUAL OR GREATER THAN 5 YEARS  10/1/2020    IR PORT PLACEMENT EQUAL OR GREATER THAN 5 YEARS 10/1/2020 [facility] PROCED[facility]    TUNNELED VENOUS PORT PLACEMENT Right 10/01/2020    Power port inserted by Dr. Rolando Esqueda       Immunization History:   Immunization History   Administered Date(s) Administered    Tdap (Boostrix, Adacel) 2017       Active Problems:  Patient Active Problem List   Diagnosis Code    Type 2 diabetes mellitus with circulatory disorder ([facility] [address] E11.59    Mixed hyperlipidemia E78.2    Essential hypertension I10    Phantom limb syndrome with pain ([facility] [address] G54.6    Tobacco abuse Z72.0    History of substance abuse (HCC) F19.11    Chronic pain syndrome G89.4    Lumbosacral spondylosis without myelopathy M47.817    Superior vena cava compression syndrome I87.1    Mediastinal mass J98.59    Pulmonary embolus (HCC) I26.99    Lung cancer (HCC) C34.90    Atypical pneumonia J18.9    Pulmonary infiltrate R91.8    Centrilobular emphysema (HCC) J43.2    SOB (shortness of breath) R06.02    Hypertension I10    Weakness R53.1    H/O: lung cancer Z85.118       Isolation/Infection:   Isolation            No Isolation          Patient Infection Status       Infection Onset Added Last Indicated Last Indicated By Review Planned Expiration Resolved Resolved By    None active    Resolved    COVID-19 Rule Out 11/06/20 11/06/20 11/06/20 COVID-19 (Ordered)   11/06/20 Rule-Out Test Resulted    COVID-19 Rule Out 10/22/20 10/22/20 10/22/20 COVID-19 (Ordered)   10/22/20 Rule-Out Test Resulted    COVID-19 Rule Out 09/29/20 09/29/20 09/29/20 COVID-19 (Ordered)   09/29/20 Rule-Out Test Resulted            Nurse Assessment:  Last Vital Signs: BP (!) 175/81   Pulse 89   Temp 97.9 °F (36.6 °C) (Oral)   Resp 16   Ht 6' (1.829 m)   Wt 191 lb 9.3 oz (86.9 kg)   SpO2 92%   BMI 25.98 kg/m²     Last documented pain score (0-10 scale): Pain Level: 0  Last Weight:   Wt Readings from Last 1 Encounters:   11/12/20 191 lb 9.3 oz (86.9 kg)     Mental Status:  alert and forgetful     IV Access:  - None    Nursing Mobility/ADLs:  Walking   Assisted  Transfer  Assisted  Bathing  Assisted  Dressing  Assisted  Toileting  Assisted  Feeding  Independent  Med Admin  Independent  Med Delivery   whole    Wound Care Documentation and Therapy:        Elimination:  Continence: Bowel: Yes  Bladder: Yes  Urinary Catheter: None   Colostomy/Ileostomy/Ileal Conduit: No       Date of Last BM: 11/13/2020     Intake/Output Summary (Last 24 hours) at 11/13/2020 1442  Last data filed at 11/13/2020 1022  Gross per 24 hour   Intake 720 ml   Output 1925 ml   Net -1205 ml     I/O last 3 completed shifts:   In: 1440 [P.O.:1440]  Out: 1975 [IWLES:0291]    Safety Concerns: At Risk for Falls and elopement     Impairments/Disabilities:      Vision, Hearing and BAKA     Nutrition Therapy:  Current Nutrition Therapy:   - Oral Diet:  General    Routes of Feeding: Oral  Liquids: Thin Liquids  Daily Fluid Restriction: no  Last Modified Barium Swallow with Video (Video Swallowing Test): not done    Treatments at the Time of Hospital Discharge:   Respiratory Treatments: yes   Oxygen Therapy:  is on oxygen at 2 L/min per nasal cannula. Ventilator:    - No ventilator support    Rehab Therapies: wheelchair assistance   Weight Bearing Status/Restrictions: No weight bearing restirctions  Other Medical Equipment (for information only, NOT a DME order):  walker  Other Treatments: pt. Getting Chemotherapy out patient for lung Cancer     Patient's personal belongings (please select all that are sent with patient):  Glasses, clothing , cigarettes , and lighter     RN SIGNATURE:  Electronically signed by Christiano Bui RN on 11/13/20 at 2:58 PM EST    CASE MANAGEMENT/SOCIAL WORK SECTION    Inpatient Status Date: 11/07/2020    Readmission Risk Assessment Score:  Readmission Risk              Risk of Unplanned Readmission:        27           Discharging to Facility/ Agency   [facility], [address] [place] [id number]         Phone: [phone]       Fax: [phone]        Dialysis Facility (if applicable)   Name:  Address:  Dialysis Schedule:  Phone:  Fax:    / signature: FIDENCIO Macedo LSW 11/13/2020 at 14:45    PHYSICIAN SECTION    Prognosis: Good    Condition at Discharge: Stable    Rehab Potential (if transferring to Rehab): Fair    Recommended Labs or Other Treatments After Discharge: ot/pt    Physician Certification: I certify the above information and transfer of Alcira Tejada  is necessary for the continuing treatment of the diagnosis listed and that he requires [facility] for greater 30 days.      Update Admission H&P: No change in H&P    PHYSICIAN SIGNATURE:  Electronically signed by Brayden Green MD on 11/13/20 at 3:47 PM EST

## 2020-11-13 NOTE — PROGRESS NOTES
Nutrition Assessment     Type and Reason for Visit: Initial    Nutrition Recommendations/Plan:   General diet   Will monitor nutritional adequacy, nutrition-related labs, weights, BMs, and clinical progress     Nutrition Assessment:  LOS. Pt with hx of pulmonary embolism, lung cancer CAD, hyperlipidemia, hypertension, PVD w/ bilateral AKA. Is on chemo and radiation. Originally presented after broken wheelchair got stuck in road after he left AMA from NH. Currently on General diet, eating more than half of meals. Per nsg screen, denied any nutrition-related issues PTA. Pt with d/c orders, placement is an ongoing issue. Malnutrition Assessment:  Malnutrition Status: No malnutrition    Nutrition Related Findings: +2 pitting edema to BUE; reviewed labs      Current Nutrition Therapies:    DIET GENERAL; Anthropometric Measures:  · Height: 6' (182.9 cm)  · Current Body Wt: 191 lb (86.6 kg)   · BMI: 25.9    Nutrition Diagnosis:   No nutrition diagnosis at this time    Nutrition Interventions:   Food and/or Nutrient Delivery:  Continue Current Diet  Nutrition Education/Counseling:  No recommendation at this time   Coordination of Nutrition Care:  Continue to monitor while inpatient    Goals:   Tolerate diet and consume greater than 50% of meals and supplements       Nutrition Monitoring and Evaluation:   Behavioral-Environmental Outcomes:  None Identified   Food/Nutrient Intake Outcomes:  Food and Nutrient Intake  Physical Signs/Symptoms Outcomes:  Biochemical Data, Nutrition Focused Physical Findings, Skin, Weight     Discharge Planning:    No discharge needs at this time     Electronically signed by Perfecto Woods RD, [place] on 11/13/20 at 8:37 AM EST    Contact: [phone]

## 2020-11-13 NOTE — PROGRESS NOTES
Patient oriented to the room. Patient has wheelchair in room . Fall risk assessment completed. Fall precautions in place. Call light within reach. Pt educated on calling for assistance before getting up. Walkway free of clutter. Will continue to monitor. Blood pressure 135/80, pulse 83, temperature 97.4 °F (36.3 °C), temperature source Oral, resp. rate 17, height 6' (1.829 m), weight 191 lb 9.3 oz (86.9 kg), SpO2 95 %. Patient on two liters of oxygen per nasal cannula.  Electronically signed by Elaine Martinez RN on 11/13/2020 at 12:35 AM

## 2020-11-13 NOTE — CARE COORDINATION
11/13 Nitin from [facility] was here to have an on site visit for this patient-- she states they have accepted him for admissions  Provided Nitin with Ame ARROYO) contact to complete dc needs.   Electronically signed by Charles Acosta on 11/13/2020 at 2:12 PM

## 2020-11-13 NOTE — PROGRESS NOTES
Pt placed on stretcher by first care pt. Had 2 liters of oxygen on via nasal cannula. Attempted to call report X 2 to [facility] but there was no answer at [phone].

## 2020-11-13 NOTE — PROGRESS NOTES
[facility]    Consult Note      Subjective     Pt states his lue is swollen-its the opposite side of the port   He is slightly more sob  He is on eliquis         Current Medications:    Current Facility-Administered Medications: acetaminophen (TYLENOL) tablet 650 mg, 650 mg, Oral, Q6H PRN **OR** [DISCONTINUED] acetaminophen (TYLENOL) suppository 650 mg, 650 mg, Rectal, Q6H PRN  pseudoephedrine (SUDAFED) tablet 60 mg, 60 mg, Oral, Q4H PRN  ipratropium-albuterol (DUONEB) nebulizer solution 1 ampule, 1 ampule, Inhalation, Q4H WA  pseudoephedrine (SUDAFED) tablet 60 mg, 60 mg, Oral, Q4H PRN  allopurinol (ZYLOPRIM) tablet 300 mg, 300 mg, Oral, Daily  apixaban (ELIQUIS) tablet 5 mg, 5 mg, Oral, BID  aspirin tablet 325 mg, 325 mg, Oral, Daily  cloNIDine (CATAPRES) tablet 0.3 mg, 0.3 mg, Oral, TID  DULoxetine (CYMBALTA) extended release capsule 60 mg, 60 mg, Oral, Daily  escitalopram (LEXAPRO) tablet 10 mg, 10 mg, Oral, Daily  hydrALAZINE (APRESOLINE) tablet 25 mg, 25 mg, Oral, TID  labetalol (NORMODYNE) tablet 300 mg, 300 mg, Oral, BID  nicotine (NICODERM CQ) 14 MG/24HR 1 patch, 1 patch, Transdermal, Daily  omeprazole (PRILOSEC) delayed release capsule 20 mg, 20 mg, Oral, Daily  potassium chloride (KLOR-CON) extended release tablet 10 mEq, 10 mEq, Oral, Daily with breakfast  rosuvastatin (CRESTOR) tablet 5 mg, 5 mg, Oral, Nightly  sodium chloride flush 0.9 % injection 10 mL, 10 mL, Intravenous, 2 times per day  sodium chloride flush 0.9 % injection 10 mL, 10 mL, Intravenous, PRN  polyethylene glycol (GLYCOLAX) packet 17 g, 17 g, Oral, Daily PRN  promethazine (PHENERGAN) tablet 12.5 mg, 12.5 mg, Oral, Q6H PRN **OR** ondansetron (ZOFRAN) injection 4 mg, 4 mg, Intravenous, Q6H PRN  Allergies:  Niacin and related and Statins [statins]      ROS per the HPI   Otherwise  10 point ROS negative     PHYSICAL EXAM:      Vitals:  BP (!) 175/81   Pulse 89   Temp 97.9 °F (36.6 °C) (Oral)   Resp 16   Ht 6' (1.829 m)   Wt 191 lb 9.3 oz (86.9 kg)   SpO2 92%   BMI 25.98 kg/m²     CONSTITUTIONAL:  awake, alert, cooperative, no apparent distress, and appears stated age NAD  EYES:  pupils equal, round and reactive to light, extra ocular muscles intact, sclera clear, conjunctiva normal  NECK:  Supple, symmetrical, trachea midline, no adenopathy, thyroid symmetric, not enlarged and no tenderness, skin normal  HEMATOLOGIC/LYMPHATICS:  no cervical lymphadenopathy, no supraclavicular lymphadenopathy,LUNGS:  No increased work of breathing, good air exchange, clear to auscultation bilaterally, no crackles or wheezing  CARDIOVASCULAR:  , regular rate and rhythm, normal S1 and S2, no S3 or S4, and no murmur noted  ABDOMEN:  No scars, normal bowel sounds, soft, non-distended, non-tender, no masses palpated, no hepatosplenomegally        DATA:    PT/INR:    No results for input(s): PROT, INR in the last 72 hours. PTT:  No results for input(s): APTT in the last 72 hours.   CMP:    Lab Results   Component Value Date     11/09/2020    K 4.1 11/09/2020    K 3.6 11/07/2020    CL 97 11/09/2020    CO2 23 11/09/2020    BUN 6 11/09/2020    PROT 6.3 11/07/2020     Magnesium:    Lab Results   Component Value Date    MG 2.20 11/05/2020     Phosphorus:  No components found for: PO4  Calcium:  No components found for: CA  CBC:    Lab Results   Component Value Date    WBC 3.8 11/09/2020    RBC 3.61 11/09/2020    HGB 10.4 11/09/2020    HCT 31.6 11/09/2020    MCV 87.4 11/09/2020    RDW 16.2 11/09/2020     11/09/2020     DIFF:    Lab Results   Component Value Date    MCV 87.4 11/09/2020    RDW 16.2 11/09/2020      LDH:  @labcrnt(LDH)@  Uric Acid:  @labcrnt(URIC)@    Radiology Review: Xr Chest (2 Vw)    Result Date: 11/6/2020  EXAMINATION: TWO XRAY VIEWS OF THE CHEST 11/6/2020 1:43 pm COMPARISON: October 22, 2020 HISTORY: ORDERING SYSTEM PROVIDED HISTORY: Shortness of breath TECHNOLOGIST PROVIDED HISTORY: Reason for exam:->Shortness of breath Reason for Exam: Shortness of breath Acuity: Acute Type of Exam: Initial Acute shortness of breath. FINDINGS: The cardiomediastinal silhouette is stable in size and contour. Large right hilar/mediastinal mass is again noted. There is a right IJ central venous catheter which extends to the lower SVC. No new infiltrate identified. Stable blunting of the costophrenic angles bilaterally, likely representing scarring. There is mild emphysema. No pneumothorax identified. No large pleural effusion. 1. Redemonstration of right hilar/mediastinal mass. 2. No significant interval change compared to 10/22/2020. 3. Mild emphysema. Xr Chest Portable    Result Date: 10/22/2020  EXAMINATION: ONE XRAY VIEW OF THE CHEST 10/22/2020 1:50 am COMPARISON: Chest radiographs 10/14/2020, 10/03/2020; CT chest 09/28/2020 HISTORY: ORDERING SYSTEM PROVIDED HISTORY: SOB TECHNOLOGIST PROVIDED HISTORY: Reason for exam:->SOB Reason for Exam: sob FINDINGS: Unchanged right perihilar convexity/masslike opacity. No new focal airspace disease. No pneumothorax. Blunted left costophrenic sulcus. Indistinct right costophrenic sulcus. Elevated right hemidiaphragm with colonic interposition. Stable heart size. Right chest port catheter tip terminates over the SVC. Small bilateral pleural effusions with unchanged right hilar mass. Colonic interposition may be seen with Chilaiditi syndrome in the appropriate context. Xr Chest Portable    Result Date: 10/14/2020  EXAMINATION: ONE XRAY VIEW OF THE CHEST 10/14/2020 1:48 am COMPARISON: 10/03/2020 HISTORY: ORDERING SYSTEM PROVIDED HISTORY: SOB TECHNOLOGIST PROVIDED HISTORY: Reason for exam:->SOB Reason for Exam: sob Acuity: Acute Relevant Medical/Surgical History: hx diabetes, hypertension FINDINGS: Right chest port is again noted. Right hilar/perihilar mass appears slightly improved though this may be due to differences in patient positioning.   There is no acute airspace disease. The heart size is normal.  There is a small left pleural effusion. There is no discernible pneumothorax. 1. No acute airspace disease. 2. Right hilar/perihilar mass again seen. Ct Chest Pulmonary Embolism W Contrast    Result Date: 11/6/2020  EXAMINATION: CTA OF THE CHEST 11/6/2020 4:15 pm TECHNIQUE: CTA of the chest was performed after the administration of intravenous contrast.  Multiplanar reformatted images are provided for review. MIP images are provided for review. Dose modulation, iterative reconstruction, and/or weight based adjustment of the mA/kV was utilized to reduce the radiation dose to as low as reasonably achievable. COMPARISON: Chest radiograph today. Chest CT 10/22/2020. HISTORY: ORDERING SYSTEM PROVIDED HISTORY: SOB, SCLC, recent PE TECHNOLOGIST PROVIDED HISTORY: Reason for exam:->SOB, SCLC, recent PE Reason for Exam: SOB, SCLC, recent PE Acuity: Acute Type of Exam: Initial FINDINGS: Pulmonary Arteries: Pulmonary arteries are adequately opacified for evaluation. Previously noted subsegmental embolism in the basal right lower lobe is not redemonstrated. .  No new pulmonary embolism. The right upper lobe segmental branches are 10 UA did by the infiltrative right hilar mass. Main pulmonary artery is normal in caliber. Mediastinum: Infiltrative right hilar mass again demonstrated with compressive affects on the right main pulmonary artery. This mass extends into the precarinal space and surrounds the SVC. Again this measures approximately 9 x 7 cm. No pericardial effusion. Scattered additional small mediastinal lymph nodes are again noted. The aorta is normal in caliber. Calcified atheromatous plaque and coronary calcifications are noted. Lungs/pleura: Peripheral ground-glass opacities in the lateral right upper lobe appear new. Patchy ground-glass opacities in the right lung apex appear more prominent. Peribronchial wall thickening in the right upper lobe is unchanged. Small right pleural effusion has developed. Minimal dependent atelectasis in the left lung base. Upper Abdomen: No acute findings. Soft Tissues/Bones: No acute bone or soft tissue abnormality. 1.  Recently demonstrated is subsegmental embolism in the right lower lobe is not identified. No new pulmonary embolism. 2.  Redemonstration of infiltrative right hilar mass encasing the right pulmonary artery and SVC. 3.  New peripheral ground-glass opacities in the right upper lobe and more prominent peripheral ground-glass opacities in the right lung apex. An underlying inflammatory process should be considered in the appropriate clinical setting. 4.  New small right pleural effusion. Ct Chest Pulmonary Embolism W Contrast    Result Date: 10/22/2020  EXAMINATION: CTA OF THE CHEST 10/22/2020 3:33 am TECHNIQUE: CTA of the chest was performed after the administration of intravenous contrast.  Multiplanar reformatted images are provided for review. MIP images are provided for review. Dose modulation, iterative reconstruction, and/or weight based adjustment of the mA/kV was utilized to reduce the radiation dose to as low as reasonably achievable. COMPARISON: CT abdomen pelvis 09/29/2020; CT chest 09/28/2020 HISTORY: ORDERING SYSTEM PROVIDED HISTORY: History of lung cancer short of breath TECHNOLOGIST PROVIDED HISTORY: Reason for exam:->History of lung cancer short of breath Reason for Exam: History of lung cancer short of breath Acuity: Acute Type of Exam: Initial FINDINGS: Pulmonary Arteries: Pulmonary arteries are adequately opacified for evaluation. Focal filling defect within the right lower lobe posterior basal subsegmental pulmonary artery. Main pulmonary artery is normal in caliber. In stranding compression of right hilar bronchovascular structures and obliteration of the right upper lobe pulmonary artery.  Mediastinum: Increased size of infiltrative mediastinal/right hilar mass measuring 8.9 x 7.5 cm in axial plane and 8.4 cm craniocaudal previously this measured 8.2 x 6.7 x 8.0 cm. Severe narrowing of the downstream SVC with resultant mediastinal and left chest collaterals and filling of the azygos system. The azygos-SVC confluence is obliterated. Obliteration of the right upper lobe lobar pulmonary artery, superior right pulmonary vein and mass effect and right upper lobe bronchial structures. Right hilar and mediastinal lymphadenopathy. Normal caliber thoracic aorta. Multivessel coronary calcifications. No pericardial effusion. Lungs/pleura: Small right pleural effusion. Ground-glass abnormality surrounds the right hilar/mediastinal mass. Bronchial walls are thickened in this distribution and there is interlobular septal thickening. [place] nodule in the subpleural left lower lobe is most suspicious for benign parenchymal lymph node. Mild centrilobular emphysema. Patchy nodular opacities in the right upper lobe. Upper Abdomen: Limited images of the upper abdomen are unremarkable for any acute findings. Nodular appearance of the left adrenal gland is unchanged, obscured by motion on today's exam. Soft Tissues/Bones: Colonic interposition with gas-filled transverse colon. No suspicious osteolytic or osteoblastic lesion. Subsegmental acute pulmonary embolism in the right lower lobe posterior basal branch. Interval increase in size of infiltrative right hilar/mediastinal mass with extrinsic compression/invasion of bronchovascular structures, as described. Metastatic right hilar and mediastinal lymph nodes. Ground-glass halo has developed in the right upper lobe along the distribution of mass, either representing lymphatic/venous congestion, locoregional disease spread or postobstructive infection. More nodular opacities in the right upper lobe are suspicious for the latter as well. Critical results were called by Dr. Jen Ortiz to [facility] on 10/22/2020 at 04:07.      Peter Lagos Wo Contrast    Result Date: 10/22/2020  EXAMINATION: MRI OF THE BRAIN WITHOUT AND WITH CONTRAST  10/22/2020 9:39 am TECHNIQUE: Multiplanar multisequence MRI of the head/brain was performed without and with the administration of intravenous contrast. COMPARISON: None. HISTORY: ORDERING SYSTEM PROVIDED HISTORY: small cell eval brain mets TECHNOLOGIST PROVIDED HISTORY: Reason for exam:->small cell eval brain mets Reason for Exam: Small Cell cancer, Eval for mets Acuity: Unknown Type of Exam: Initial Relevant Medical/Surgical History: Lung cancer FINDINGS: Motion degrades images limiting evaluation. INTRACRANIAL STRUCTURES/VENTRICLES:  There is no acute infarct. No mass effect or midline shift. No evidence of an acute intracranial hemorrhage. Areas of T2 FLAIR hyperintensity are seen in the periventricular and subcortical white matter, which are nonspecific, but may represent chronic microvascular ischemic change. There is prominence of the ventricles and sulci due to global parenchymal volume loss. The sellar/suprasellar regions appear unremarkable. The normal signal voids within the major intracranial vessels appear maintained. No abnormal focus of enhancement is seen within the brain. ORBITS: The visualized portion of the orbits demonstrate no acute abnormality. SINUSES: Minimal scattered mucosal thickening of the paranasal sinuses. Bilateral mastoid effusions, left greater than right. BONES/SOFT TISSUES: The bone marrow signal intensity is not well evaluated due to motion the soft tissues demonstrate no acute abnormality. 1. Patient motion degrades images limiting evaluation. 2. No convincing acute intracranial abnormality. No acute infarct. 3. No gross evidence to suggest intracranial metastatic disease. 4. Mild global parenchymal volume loss with mild chronic microvascular ischemic changes. 5. Bilateral mastoid effusions, left greater than right.          Problem List  Patient Active Problem List

## 2020-11-13 NOTE — PROGRESS NOTES
Late entry pt seen 11/12                 [facility]    Consult Note      Subjective   NO change clinically awaiting placement   No new decline         Current Medications:    Current Facility-Administered Medications: acetaminophen (TYLENOL) tablet 650 mg, 650 mg, Oral, Q6H PRN **OR** [DISCONTINUED] acetaminophen (TYLENOL) suppository 650 mg, 650 mg, Rectal, Q6H PRN  pseudoephedrine (SUDAFED) tablet 60 mg, 60 mg, Oral, Q4H PRN  ipratropium-albuterol (DUONEB) nebulizer solution 1 ampule, 1 ampule, Inhalation, Q4H WA  pseudoephedrine (SUDAFED) tablet 60 mg, 60 mg, Oral, Q4H PRN  allopurinol (ZYLOPRIM) tablet 300 mg, 300 mg, Oral, Daily  apixaban (ELIQUIS) tablet 5 mg, 5 mg, Oral, BID  aspirin tablet 325 mg, 325 mg, Oral, Daily  cloNIDine (CATAPRES) tablet 0.3 mg, 0.3 mg, Oral, TID  DULoxetine (CYMBALTA) extended release capsule 60 mg, 60 mg, Oral, Daily  escitalopram (LEXAPRO) tablet 10 mg, 10 mg, Oral, Daily  hydrALAZINE (APRESOLINE) tablet 25 mg, 25 mg, Oral, TID  labetalol (NORMODYNE) tablet 300 mg, 300 mg, Oral, BID  nicotine (NICODERM CQ) 14 MG/24HR 1 patch, 1 patch, Transdermal, Daily  omeprazole (PRILOSEC) delayed release capsule 20 mg, 20 mg, Oral, Daily  potassium chloride (KLOR-CON) extended release tablet 10 mEq, 10 mEq, Oral, Daily with breakfast  rosuvastatin (CRESTOR) tablet 5 mg, 5 mg, Oral, Nightly  sodium chloride flush 0.9 % injection 10 mL, 10 mL, Intravenous, 2 times per day  sodium chloride flush 0.9 % injection 10 mL, 10 mL, Intravenous, PRN  polyethylene glycol (GLYCOLAX) packet 17 g, 17 g, Oral, Daily PRN  promethazine (PHENERGAN) tablet 12.5 mg, 12.5 mg, Oral, Q6H PRN **OR** ondansetron (ZOFRAN) injection 4 mg, 4 mg, Intravenous, Q6H PRN  Allergies:  Niacin and related and Statins [statins]      ROS per the HPI   Otherwise  10 point ROS negative     PHYSICAL EXAM:      Vitals:  BP (!) 175/81   Pulse 89   Temp 97.9 °F (36.6 °C) (Oral)   Resp 16   Ht 6' (1.829 m)   Wt 191 lb 9.3 oz (86.9 kg)   SpO2 92%   BMI 25.98 kg/m²     CONSTITUTIONAL:  awake, alert, cooperative, no apparent distress, and appears stated age NAD  EYES:  pupils equal, round and reactive to light, extra ocular muscles intact, sclera clear, conjunctiva normal  NECK:  Supple, symmetrical, trachea midline, no adenopathy, thyroid symmetric, not enlarged and no tenderness, skin normal  HEMATOLOGIC/LYMPHATICS:  no cervical lymphadenopathy, no supraclavicular lymphadenopathy,LUNGS:  No increased work of breathing, good air exchange, clear to auscultation bilaterally, no crackles or wheezing  CARDIOVASCULAR:  , regular rate and rhythm, normal S1 and S2, no S3 or S4, and no murmur noted  ABDOMEN:  No scars, normal bowel sounds, soft, non-distended, non-tender, no masses palpated, no hepatosplenomegally        DATA:    PT/INR:    No results for input(s): PROT, INR in the last 72 hours. PTT:  No results for input(s): APTT in the last 72 hours.   CMP:    Lab Results   Component Value Date     11/09/2020    K 4.1 11/09/2020    K 3.6 11/07/2020    CL 97 11/09/2020    CO2 23 11/09/2020    BUN 6 11/09/2020    PROT 6.3 11/07/2020     Magnesium:    Lab Results   Component Value Date    MG 2.20 11/05/2020     Phosphorus:  No components found for: PO4  Calcium:  No components found for: CA  CBC:    Lab Results   Component Value Date    WBC 3.8 11/09/2020    RBC 3.61 11/09/2020    HGB 10.4 11/09/2020    HCT 31.6 11/09/2020    MCV 87.4 11/09/2020    RDW 16.2 11/09/2020     11/09/2020     DIFF:    Lab Results   Component Value Date    MCV 87.4 11/09/2020    RDW 16.2 11/09/2020      LDH:  @labcrnt(LDH)@  Uric Acid:  @labcrnt(URIC)@    Radiology Review: Xr Chest (2 Vw)    Result Date: 11/6/2020  EXAMINATION: TWO XRAY VIEWS OF THE CHEST 11/6/2020 1:43 pm COMPARISON: October 22, 2020 HISTORY: ORDERING SYSTEM PROVIDED HISTORY: Shortness of breath TECHNOLOGIST PROVIDED HISTORY: Reason for exam:->Shortness of breath Reason for Exam: craniocaudal previously this measured 8.2 x 6.7 x 8.0 cm. Severe narrowing of the downstream SVC with resultant mediastinal and left chest collaterals and filling of the azygos system. The azygos-SVC confluence is obliterated. Obliteration of the right upper lobe lobar pulmonary artery, superior right pulmonary vein and mass effect and right upper lobe bronchial structures. Right hilar and mediastinal lymphadenopathy. Normal caliber thoracic aorta. Multivessel coronary calcifications. No pericardial effusion. Lungs/pleura: Small right pleural effusion. Ground-glass abnormality surrounds the right hilar/mediastinal mass. Bronchial walls are thickened in this distribution and there is interlobular septal thickening. [place] nodule in the subpleural left lower lobe is most suspicious for benign parenchymal lymph node. Mild centrilobular emphysema. Patchy nodular opacities in the right upper lobe. Upper Abdomen: Limited images of the upper abdomen are unremarkable for any acute findings. Nodular appearance of the left adrenal gland is unchanged, obscured by motion on today's exam. Soft Tissues/Bones: Colonic interposition with gas-filled transverse colon. No suspicious osteolytic or osteoblastic lesion. Subsegmental acute pulmonary embolism in the right lower lobe posterior basal branch. Interval increase in size of infiltrative right hilar/mediastinal mass with extrinsic compression/invasion of bronchovascular structures, as described. Metastatic right hilar and mediastinal lymph nodes. Ground-glass halo has developed in the right upper lobe along the distribution of mass, either representing lymphatic/venous congestion, locoregional disease spread or postobstructive infection. More nodular opacities in the right upper lobe are suspicious for the latter as well. Critical results were called by Dr. Izabella Kilgore to [facility] on 10/22/2020 at 04:07.      Mri Brain W Wo Contrast    Result

## 2020-11-13 NOTE — PROGRESS NOTES
Hospitalist Progress Note  11/13/2020 8:59 AM    PCP: Arslan Sawyer MD    [id number]     Date of Admission: 11/7/2020                                                                                                                     HOSPITAL COURSE    Patient demographics:  The patient  Javon Wagner is a 62 y.o. male     Significant past medical history:   Patient Active Problem List   Diagnosis    Type 2 diabetes mellitus with circulatory disorder ([facility]    Mixed hyperlipidemia    Essential hypertension    Phantom limb syndrome with pain ([facility]    Tobacco abuse    History of substance abuse ([facility] [facility]    Chronic pain syndrome    Lumbosacral spondylosis without myelopathy    Superior vena cava compression syndrome    Mediastinal mass    Pulmonary embolus ([facility] [facility]    Lung cancer ([facility] [facility]    Atypical pneumonia    Pulmonary infiltrate    Centrilobular emphysema (HCC)    SOB (shortness of breath)    Hypertension    Weakness         Presenting symptoms:  SOB, lung cancer, homeless    Diagnostic workup:  CT CHEST PULMONARY EMBOLISM W CONTRAST         CONSULTS DURING ADMISSION :   IP CONSULT TO SOCIAL WORK  IP CONSULT TO SOCIAL WORK  IP CONSULT TO ONCOLOGY  IP CONSULT TO SOCIAL WORK  IP CONSULT TO SOCIAL WORK      Patient was diagnosed with:  Hypertension  Small cell lung carcinoma:     Treatment while inpatient:  62years old male with medical history significant for pulmonary embolism, lung cancer CAD, hyperlipidemia, hypertension, PVD w/ bilateral AKA. Patient was brought in by the emergency medical services when he was found in the middle of the road with broken wheelchair.                        Patient's urine tested positive for cannabinoids and amphetamine.                                                             ----------------------------------------------------------      SUBJECTIVE COMPLAINTS- follow up for SOB , lung cancer    Diet: DIET GENERAL;      OBJECTIVE:   Patient Active Problem List Diagnosis    Type 2 diabetes mellitus with circulatory disorder (HCC)    Mixed hyperlipidemia    Essential hypertension    Phantom limb syndrome with pain (HCC)    Tobacco abuse    History of substance abuse ([facility]    Chronic pain syndrome    Lumbosacral spondylosis without myelopathy    Superior vena cava compression syndrome    Mediastinal mass    Pulmonary embolus (HCC)    Lung cancer (HCC)    Atypical pneumonia    Pulmonary infiltrate    Centrilobular emphysema (HCC)    SOB (shortness of breath)    Hypertension    Weakness       Allergies  Niacin and related and Statins [statins]    Medications    Scheduled Meds:   ipratropium-albuterol  1 ampule Inhalation Q4H WA    allopurinol  300 mg Oral Daily    apixaban  5 mg Oral BID    aspirin  325 mg Oral Daily    cloNIDine  0.3 mg Oral TID    DULoxetine  60 mg Oral Daily    escitalopram  10 mg Oral Daily    hydrALAZINE  25 mg Oral TID    labetalol  300 mg Oral BID    nicotine  1 patch Transdermal Daily    omeprazole  20 mg Oral Daily    potassium chloride  10 mEq Oral Daily with breakfast    rosuvastatin  5 mg Oral Nightly    sodium chloride flush  10 mL Intravenous 2 times per day     Continuous Infusions:  PRN Meds:  acetaminophen **OR** [DISCONTINUED] acetaminophen, pseudoephedrine, pseudoephedrine, sodium chloride flush, polyethylene glycol, promethazine **OR** ondansetron    Vitals   Vitals /wt   Patient Vitals for the past 8 hrs:   BP Temp Temp src Pulse Resp SpO2 Height   11/13/20 0822 -- -- -- -- -- -- 6' (1.829 m)   11/13/20 5794 (!) 175/81 97.9 °F (36.6 °C) Oral 89 16 92 % --        72HR INTAKE/OUTPUT:      Intake/Output Summary (Last 24 hours) at 11/13/2020 0859  Last data filed at 11/13/2020 3404  Gross per 24 hour   Intake 1320 ml   Output 1575 ml   Net -255 ml       Exam:    Gen:   Alert and oriented ×3  Eyes: PERRL. No sclera icterus. No conjunctival injection. ENT: No discharge. Pharynx clear.  External appearance of ears and nose normal.  Neck: Trachea midline. No obvious mass. Resp: No accessory muscle use. No crackles. No wheezes. No rhonchi. CV: Regular rate. Regular rhythm. No murmur or rub. No edema. GI: Non-tender. Non-distended. No hernia. Skin: Warm, dry, normal texture and turgor. Lymph: No cervical LAD. No supraclavicular LAD. M/S: / Ext. No cyanosis. No clubbing. No joint deformity. Neuro: CN 2-12 are intact,  no neurologic deficits noted. PT/INR: No results for input(s): PROTIME, INR in the last 72 hours. APTT: No results for input(s): APTT in the last 72 hours. CBC:   No results for input(s): WBC, HGB, HCT, MCV, PLT in the last 72 hours. BMP:   No results for input(s): NA, K, CL, CO2, PHOS, BUN, CREATININE in the last 72 hours. Invalid input(s): CA    LIVER PROFILE:   No results for input(s): ALKPHOS, AST, ALT, ALB, BILIDIR, BILITOT, ALKPHOS in the last 72 hours. No results for input(s): AMYLASE in the last 72 hours. No results for input(s): LIPASE in the last 72 hours. UA:No results for input(s): NITRITE, LABCAST, WBCUA, RBCUA, MUCUS in the last 72 hours. TROPONIN:   No results for input(s): Earlis Carrabelle in the last 72 hours. Lab Results   Component Value Date/Time    URRFLXCULT Not Indicated 02/29/2020 11:03 PM       No results for input(s): TSHREFLEX in the last 72 hours. No components found for: [id number]  POC GLUCOSE:  No results for input(s): POCGLU in the last 72 hours. No results for input(s): LABA1C in the last 72 hours.    Lab Results   Component Value Date    LABA1C 6.3 02/13/2020         ASSESSMENT AND PLAN    Small cell lung carcinoma:   Heme-onc managing chemo on an outpatient basis   Hematology oncology is following  Plan is to give patient third cycle of chemo on Monday and also start radiation treatment on Monday  Social work is working on placement        Leukocytopenia anemia  Due to chemotherapy    Tobacco abuse Z72.0  Nicotine patch and counseling    COPD  Bronchodilators     Urine drug screen positive:   Amphetamine and cannabis      Hypertension:   Continue hypertension regimen      Homelessness:   consult social service: Social service consult notes indicate that there have been multiple applications placed to several different facilities to see if they will accept the patient.     All meds continued        Code Status: Full Code        Dispo -placement still pending        The patient and / or the family were informed of the results of any tests, a time was given to answer questions, a plan was proposed and they agreed with plan. Luciano Lambert MD    This note was transcribed using [id number]. Please disregard any translational errors.

## 2020-11-13 NOTE — PROGRESS NOTES
Patient on 2 liters of oxygen per nasal cannula. Pt assessed for pain. Pt denies any pain at this time. Will continue to monitor pt and assess for pain throughout rest of shift. Patient watching television. Call light within reach. Patients needs met at this time. Will continue to monitor and assess.  Electronically signed by Gibson Elias RN on 11/13/2020 at 4:29 AM

## 2020-11-14 NOTE — DISCHARGE SUMMARY
Hospital Medicine Discharge Summary      Patient ID: Meera Hicks , [id number]     Patient's PCP: Marcela Arellano MD    Admit Date: 11/7/2020     Discharge Date: 11/13/2020      Admitting Physician: Jamia Noble MD    Discharge Physician: Adonis Mooney MD     Discharge Diagnoses: Active Hospital Problems    Diagnosis Date Noted    H/O: lung cancer [Z85.118] 11/13/2020    SOB (shortness of breath) [R06.02]          The patient was seen and examined on the day of discharge and this discharge summary is in conjunction with any daily progress note from day of discharge. HOSPITAL COURSE       Patient demographics:  The patient  Meera Hicks is a 62 y.o. male      Significant past medical history:       Patient Active Problem List   Diagnosis    Type 2 diabetes mellitus with circulatory disorder ([address]    Mixed hyperlipidemia    Essential hypertension    Phantom limb syndrome with pain ([address]    Tobacco abuse    History of substance abuse ([address]    Chronic pain syndrome    Lumbosacral spondylosis without myelopathy    Superior vena cava compression syndrome    Mediastinal mass    Pulmonary embolus ([address]    Lung cancer ([address]    Atypical pneumonia    Pulmonary infiltrate    Centrilobular emphysema (HCC)    SOB (shortness of breath)    Hypertension    Weakness            Presenting symptoms:  SOB, lung cancer, homeless     Diagnostic workup:  CT CHEST PULMONARY EMBOLISM W CONTRAST            CONSULTS DURING ADMISSION :   IP CONSULT TO SOCIAL WORK  IP CONSULT TO SOCIAL WORK  IP CONSULT TO ONCOLOGY  IP CONSULT TO SOCIAL WORK  IP CONSULT TO SOCIAL WORK        Patient was diagnosed with:  Hypertension  Small cell lung carcinoma:      Treatment while inpatient:  62years old male with medical history significant for pulmonary embolism, lung cancer CAD, hyperlipidemia, hypertension, PVD w/ bilateral AKA.   Patient was brought in by the emergency medical services when he was found in the middle of the road with broken wheelchair. Patient's urine tested positive for cannabinoids and amphetamine. Social work was consulted for placement. Patient also has a history of lung cancer for which hematology oncology was consulted patient is scheduled for a chemotherapy and radiation therapy on Monday  Patient will be discharged to the extended care facility and he will follow-up with hematology oncology on Monday        Discharge Condition:  stable:    Discharged to:  skilled nursing  facility    Activity:   as tolerated: Follow Up: [facility]:  For convenience and continuity at follow-up the following most recent labs are provided:      CBC:   Lab Results   Component Value Date    WBC 3.8 2020    HGB 10.4 2020    HCT 31.6 2020     2020       RENAL:   Lab Results   Component Value Date     2020    K 4.1 2020    K 3.6 2020    CL 97 2020    CO2 23 2020    BUN 6 2020    CREATININE <0.5 2020           Discharge Medications:    [facility] Medication Instructions PB    Printed on:20 7175   Medication Information                      allopurinol (ZYLOPRIM) 300 MG tablet  Take 1 tablet by mouth daily             apixaban (ELIQUIS) 5 MG TABS tablet  Take 1 tablet by mouth 2 times daily             aspirin 325 MG tablet  Take 1 tablet by mouth daily             benzonatate (TESSALON PERLES) 100 MG capsule  Take 1 capsule by mouth 3 times daily as needed for Cough             cetirizine (ZYRTEC) 10 MG tablet  Take 1 tablet by mouth daily             cloNIDine (CATAPRES) 0.3 MG tablet  Take 1 tablet by mouth 3 times daily             diphenhydrAMINE (BENADRYL) 25 MG tablet  Take 12.5 mg by mouth every 6 hours as needed for Itching             DULoxetine (CYMBALTA) 60 MG extended release capsule  Take 1 capsule by mouth daily escitalopram (LEXAPRO) 10 MG tablet  TAKE ONE TABLET BY MOUTH DAILY             hydrALAZINE (APRESOLINE) 25 MG tablet  Take 25 mg by mouth 3 times daily             labetalol (NORMODYNE) 300 MG tablet  Take 1 tablet by mouth 2 times daily             Lidocaine-Glycerin 5-14.4 % CREA  Place rectally every 6 hours as needed (hemorroids)             nicotine (NICODERM CQ) 14 MG/24HR  Place 1 patch onto the skin daily             omeprazole (PRILOSEC) 20 MG delayed release capsule  Take 1 capsule by mouth Daily             potassium chloride (MICRO-K) 10 MEQ extended release capsule  Take 10 mEq by mouth daily             prochlorperazine (COMPAZINE) 10 MG tablet  Take 10 mg by mouth every 6 hours as needed             rosuvastatin (CRESTOR) 5 MG tablet  Take 1 tablet by mouth nightly             tiotropium (SPIRIVA) 18 MCG inhalation capsule  Inhale 18 mcg into the lungs 2 times daily                    Time Spent on discharge is more than 30 min in the examination, evaluation, counseling and review of medications and discharge plan. Signed:  Eddi Forte MD   11/13/2020      Thank you Mariano Quesada MD for the opportunity to be involved in this patient's care. If you have any questions or concerns please feel free to contact me at [phone]. This note was transcribed using [id number]. Please disregard any translational errors.
